# Patient Record
Sex: FEMALE | Race: WHITE | NOT HISPANIC OR LATINO | Employment: OTHER | URBAN - METROPOLITAN AREA
[De-identification: names, ages, dates, MRNs, and addresses within clinical notes are randomized per-mention and may not be internally consistent; named-entity substitution may affect disease eponyms.]

---

## 2023-02-07 PROBLEM — R31.9 HEMATURIA: Status: ACTIVE | Noted: 2023-02-07

## 2023-02-07 PROBLEM — R25.2 LEG CRAMPS: Status: ACTIVE | Noted: 2023-02-07

## 2023-02-07 PROBLEM — M79.606 LEG PAIN, DIFFUSE: Status: ACTIVE | Noted: 2023-02-07

## 2023-02-07 PROBLEM — G57.90 NERVE ENTRAPMENT SYNDROME OF FOOT: Status: ACTIVE | Noted: 2023-02-07

## 2023-02-07 PROBLEM — R91.8 LUNG NODULES: Status: ACTIVE | Noted: 2023-02-07

## 2023-02-07 PROBLEM — D10.1 PAPILLOMA OF TONGUE: Status: ACTIVE | Noted: 2023-02-07

## 2023-02-07 PROBLEM — D72.829 LEUKOCYTOSIS: Status: ACTIVE | Noted: 2023-02-07

## 2023-02-07 PROBLEM — E66.811 OBESITY (BMI 30.0-34.9): Status: ACTIVE | Noted: 2023-02-07

## 2023-02-07 PROBLEM — G56.22 CUBITAL TUNNEL SYNDROME ON LEFT: Status: ACTIVE | Noted: 2023-02-07

## 2023-02-07 PROBLEM — N63.20 LEFT BREAST MASS: Status: ACTIVE | Noted: 2023-02-07

## 2023-02-07 PROBLEM — S39.012A SACROILIAC STRAIN: Status: ACTIVE | Noted: 2023-02-07

## 2023-02-07 PROBLEM — I25.10 CAD (CORONARY ARTERY DISEASE): Status: ACTIVE | Noted: 2023-02-07

## 2023-02-07 PROBLEM — R55 SYNCOPE: Status: ACTIVE | Noted: 2023-02-07

## 2023-02-07 PROBLEM — Z85.3 HISTORY OF BREAST CANCER: Status: ACTIVE | Noted: 2023-02-07

## 2023-02-07 PROBLEM — K21.9 GERD (GASTROESOPHAGEAL REFLUX DISEASE): Status: ACTIVE | Noted: 2023-02-07

## 2023-02-07 PROBLEM — L57.0 ACTINIC KERATOSIS: Status: ACTIVE | Noted: 2023-02-07

## 2023-02-07 PROBLEM — R00.2 PALPITATIONS: Status: ACTIVE | Noted: 2023-02-07

## 2023-02-07 PROBLEM — E04.2 MULTIPLE THYROID NODULES: Status: ACTIVE | Noted: 2023-02-07

## 2023-02-07 PROBLEM — N81.10 FEMALE CYSTOCELE: Status: ACTIVE | Noted: 2023-02-07

## 2023-02-07 PROBLEM — M51.36 DDD (DEGENERATIVE DISC DISEASE), LUMBAR: Status: ACTIVE | Noted: 2023-02-07

## 2023-02-07 PROBLEM — H40.009 BORDERLINE GLAUCOMA: Status: ACTIVE | Noted: 2023-02-07

## 2023-02-07 PROBLEM — R35.1 NOCTURIA: Status: ACTIVE | Noted: 2023-02-07

## 2023-02-07 PROBLEM — R21 RASH: Status: ACTIVE | Noted: 2023-02-07

## 2023-02-07 PROBLEM — R20.0 NUMBNESS OF LEFT HAND: Status: ACTIVE | Noted: 2023-01-25

## 2023-02-07 PROBLEM — M17.9 KNEE OSTEOARTHRITIS: Status: ACTIVE | Noted: 2023-02-07

## 2023-02-07 PROBLEM — N28.89 LEFT KIDNEY MASS: Status: ACTIVE | Noted: 2023-02-07

## 2023-02-07 PROBLEM — M51.369 DDD (DEGENERATIVE DISC DISEASE), LUMBAR: Status: ACTIVE | Noted: 2023-02-07

## 2023-02-07 PROBLEM — I10 BENIGN ESSENTIAL HYPERTENSION: Status: ACTIVE | Noted: 2023-02-07

## 2023-02-07 PROBLEM — L98.9 ARM SKIN LESION, LEFT: Status: ACTIVE | Noted: 2023-02-07

## 2023-02-07 PROBLEM — M89.8X3: Status: ACTIVE | Noted: 2023-02-07

## 2023-02-07 PROBLEM — R33.9 URINARY RETENTION: Status: ACTIVE | Noted: 2023-02-07

## 2023-02-07 PROBLEM — E87.1 HYPONATREMIA: Status: ACTIVE | Noted: 2023-02-07

## 2023-02-07 PROBLEM — E03.9 HYPOTHYROIDISM: Status: ACTIVE | Noted: 2023-02-07

## 2023-02-07 PROBLEM — M54.50 LOW BACK PAIN: Status: ACTIVE | Noted: 2023-02-07

## 2023-02-07 PROBLEM — C50.919 BREAST CANCER (MULTI): Status: ACTIVE | Noted: 2023-02-07

## 2023-02-07 PROBLEM — E78.5 HYPERLIPIDEMIA: Status: ACTIVE | Noted: 2023-02-07

## 2023-02-07 PROBLEM — M54.16 LUMBAR RADICULITIS: Status: ACTIVE | Noted: 2023-02-07

## 2023-02-07 PROBLEM — R92.8 ABNORMAL MAMMOGRAM: Status: ACTIVE | Noted: 2023-02-07

## 2023-02-07 PROBLEM — R30.0 DYSURIA: Status: ACTIVE | Noted: 2023-02-07

## 2023-02-07 PROBLEM — E66.9 OBESITY (BMI 30.0-34.9): Status: ACTIVE | Noted: 2023-02-07

## 2023-02-07 PROBLEM — R01.1 MURMUR: Status: ACTIVE | Noted: 2023-02-07

## 2023-02-07 RX ORDER — LOSARTAN POTASSIUM 50 MG/1
50 TABLET ORAL DAILY
COMMUNITY
Start: 2020-04-05

## 2023-02-07 RX ORDER — METOPROLOL TARTRATE 50 MG/1
25 TABLET ORAL 2 TIMES DAILY
COMMUNITY
Start: 2015-03-10 | End: 2024-05-16 | Stop reason: SDUPTHER

## 2023-02-07 RX ORDER — NAPROXEN SODIUM 220 MG/1
1200 TABLET ORAL DAILY
COMMUNITY
Start: 2019-01-30

## 2023-02-07 RX ORDER — CETIRIZINE HYDROCHLORIDE 10 MG/1
10 TABLET ORAL DAILY
COMMUNITY
Start: 2020-08-06 | End: 2023-05-15 | Stop reason: ALTCHOICE

## 2023-02-07 RX ORDER — GABAPENTIN 300 MG/1
300 CAPSULE ORAL 3 TIMES DAILY
COMMUNITY
Start: 2021-04-13 | End: 2023-04-03 | Stop reason: SDUPTHER

## 2023-02-07 RX ORDER — ASPIRIN 81 MG/1
81 TABLET ORAL
COMMUNITY
Start: 2019-01-30

## 2023-02-07 RX ORDER — NITROFURANTOIN 25; 75 MG/1; MG/1
100 CAPSULE ORAL EVERY 12 HOURS SCHEDULED
COMMUNITY
End: 2023-04-03 | Stop reason: ALTCHOICE

## 2023-02-07 RX ORDER — ROSUVASTATIN CALCIUM 20 MG/1
20 TABLET, COATED ORAL DAILY
COMMUNITY
Start: 2019-06-20

## 2023-02-07 RX ORDER — OMEPRAZOLE 40 MG/1
40 CAPSULE, DELAYED RELEASE ORAL
COMMUNITY
Start: 2021-03-25 | End: 2023-06-26

## 2023-02-07 RX ORDER — NITROGLYCERIN 0.4 MG/1
0.4 TABLET SUBLINGUAL
COMMUNITY
Start: 2019-06-20

## 2023-02-07 RX ORDER — CALCIUM CARBONATE 600 MG
600 TABLET ORAL DAILY
COMMUNITY
Start: 2017-09-14

## 2023-02-07 RX ORDER — AMLODIPINE BESYLATE 10 MG/1
10 TABLET ORAL DAILY
COMMUNITY
Start: 2013-11-06

## 2023-02-07 RX ORDER — ISOSORBIDE MONONITRATE 60 MG/1
60 TABLET, EXTENDED RELEASE ORAL DAILY
COMMUNITY
Start: 2015-12-07

## 2023-02-07 RX ORDER — LEVOTHYROXINE SODIUM 50 UG/1
50 TABLET ORAL DAILY
COMMUNITY
Start: 2015-01-27 | End: 2023-04-20

## 2023-02-07 RX ORDER — MULTIVITAMIN
1 TABLET ORAL DAILY
COMMUNITY

## 2023-02-16 PROBLEM — N39.0 UTI (URINARY TRACT INFECTION): Status: ACTIVE | Noted: 2023-02-16

## 2023-02-16 PROBLEM — M89.319 CLAVICLE ENLARGEMENT: Status: ACTIVE | Noted: 2023-02-16

## 2023-02-16 PROBLEM — R22.2 CHEST MASS: Status: ACTIVE | Noted: 2023-02-16

## 2023-02-16 PROBLEM — J20.9 ACUTE BRONCHITIS: Status: ACTIVE | Noted: 2023-02-16

## 2023-02-16 PROBLEM — R07.9 CHEST PAIN: Status: ACTIVE | Noted: 2023-02-16

## 2023-02-16 PROBLEM — R39.9 UTI SYMPTOMS: Status: ACTIVE | Noted: 2023-02-16

## 2023-02-16 PROBLEM — R07.89 CHEST TIGHTNESS: Status: ACTIVE | Noted: 2023-02-16

## 2023-02-16 PROBLEM — R35.0 FREQUENT URINATION: Status: ACTIVE | Noted: 2023-02-16

## 2023-02-16 PROBLEM — S32.010A COMPRESSION FRACTURE OF L1 LUMBAR VERTEBRA (MULTI): Status: ACTIVE | Noted: 2023-02-16

## 2023-04-03 ENCOUNTER — OFFICE VISIT (OUTPATIENT)
Dept: PRIMARY CARE | Facility: CLINIC | Age: 78
End: 2023-04-03
Payer: MEDICARE

## 2023-04-03 VITALS
BODY MASS INDEX: 32.75 KG/M2 | WEIGHT: 156 LBS | HEIGHT: 58 IN | SYSTOLIC BLOOD PRESSURE: 142 MMHG | TEMPERATURE: 97.7 F | DIASTOLIC BLOOD PRESSURE: 70 MMHG

## 2023-04-03 DIAGNOSIS — R20.2 PARESTHESIA AND PAIN OF BOTH UPPER EXTREMITIES: ICD-10-CM

## 2023-04-03 DIAGNOSIS — S32.010D COMPRESSION FRACTURE OF L1 VERTEBRA WITH ROUTINE HEALING, SUBSEQUENT ENCOUNTER: ICD-10-CM

## 2023-04-03 DIAGNOSIS — Z00.00 ROUTINE GENERAL MEDICAL EXAMINATION AT HEALTH CARE FACILITY: Primary | ICD-10-CM

## 2023-04-03 DIAGNOSIS — I10 BENIGN ESSENTIAL HYPERTENSION: ICD-10-CM

## 2023-04-03 DIAGNOSIS — M79.602 PARESTHESIA AND PAIN OF BOTH UPPER EXTREMITIES: ICD-10-CM

## 2023-04-03 DIAGNOSIS — M79.601 PARESTHESIA AND PAIN OF BOTH UPPER EXTREMITIES: ICD-10-CM

## 2023-04-03 DIAGNOSIS — C50.912 MALIGNANT NEOPLASM OF LEFT FEMALE BREAST, UNSPECIFIED ESTROGEN RECEPTOR STATUS, UNSPECIFIED SITE OF BREAST (MULTI): ICD-10-CM

## 2023-04-03 PROCEDURE — 1160F RVW MEDS BY RX/DR IN RCRD: CPT | Performed by: FAMILY MEDICINE

## 2023-04-03 PROCEDURE — 3078F DIAST BP <80 MM HG: CPT | Performed by: FAMILY MEDICINE

## 2023-04-03 PROCEDURE — 1036F TOBACCO NON-USER: CPT | Performed by: FAMILY MEDICINE

## 2023-04-03 PROCEDURE — 1159F MED LIST DOCD IN RCRD: CPT | Performed by: FAMILY MEDICINE

## 2023-04-03 PROCEDURE — 99397 PER PM REEVAL EST PAT 65+ YR: CPT | Performed by: FAMILY MEDICINE

## 2023-04-03 PROCEDURE — 1170F FXNL STATUS ASSESSED: CPT | Performed by: FAMILY MEDICINE

## 2023-04-03 PROCEDURE — G0439 PPPS, SUBSEQ VISIT: HCPCS | Performed by: FAMILY MEDICINE

## 2023-04-03 PROCEDURE — 3077F SYST BP >= 140 MM HG: CPT | Performed by: FAMILY MEDICINE

## 2023-04-03 RX ORDER — GABAPENTIN 300 MG/1
300 CAPSULE ORAL 3 TIMES DAILY
Qty: 270 CAPSULE | Refills: 1 | Status: SHIPPED | OUTPATIENT
Start: 2023-04-03 | End: 2023-09-15

## 2023-04-03 ASSESSMENT — ACTIVITIES OF DAILY LIVING (ADL)
GROCERY_SHOPPING: INDEPENDENT
TAKING_MEDICATION: INDEPENDENT
BATHING: INDEPENDENT
DRESSING: INDEPENDENT
DOING_HOUSEWORK: INDEPENDENT
MANAGING_FINANCES: INDEPENDENT

## 2023-04-03 ASSESSMENT — ENCOUNTER SYMPTOMS
BACK PAIN: 0
SORE THROAT: 0
HEADACHES: 0
PALPITATIONS: 0
NAUSEA: 0
ROS SKIN COMMENTS: NO MOLES GROWING OR CHANGING.
ARTHRALGIAS: 0
VOMITING: 0
BLOOD IN STOOL: 0
OCCASIONAL FEELINGS OF UNSTEADINESS: 0
FEVER: 0
RHINORRHEA: 0
NUMBNESS: 1
HEMATURIA: 0
SINUS PRESSURE: 0
VOICE CHANGE: 0
CONSTIPATION: 0
WHEEZING: 0
LOSS OF SENSATION IN FEET: 0
ABDOMINAL PAIN: 0
SHORTNESS OF BREATH: 0
DYSPHORIC MOOD: 0
ADENOPATHY: 0
MYALGIAS: 0
COUGH: 0
DEPRESSION: 0
DIARRHEA: 0
WEAKNESS: 0
FATIGUE: 0
FREQUENCY: 0
WOUND: 0

## 2023-04-03 ASSESSMENT — PATIENT HEALTH QUESTIONNAIRE - PHQ9
SUM OF ALL RESPONSES TO PHQ9 QUESTIONS 1 AND 2: 0
1. LITTLE INTEREST OR PLEASURE IN DOING THINGS: NOT AT ALL
2. FEELING DOWN, DEPRESSED OR HOPELESS: NOT AT ALL

## 2023-04-03 NOTE — PATIENT INSTRUCTIONS
It was nice to see you for a general medical exam.  Your exam was normal in general.  You have what looks like carpal tunnel syndrome.  You have tried bracing and occupational therapy.  I recommend that you follow up with Dr Mcnally.  I recommend weight loss.  Try to avoid sugars and starches in the diet.  Base your diet on fresh fruits and vegetables.  Continue to exercise regularly.  Return in six months for a recheck and sooner if you are worse.

## 2023-04-03 NOTE — PROGRESS NOTES
"Subjective   Reason for Visit: Ana Vega is an 77 y.o. female here for a Medicare Wellness visit.     Past Medical, Surgical, and Family History reviewed and updated in chart.    Reviewed all medications by prescribing practitioner or clinical pharmacist (such as prescriptions, OTCs, herbal therapies and supplements) and documented in the medical record.  Does not have advanced directives.  Recommend get them and bring in a copy.    Mammogram 2/2023. Has a hx of right breast cancer.    Has a hx of L1 fx.  From syncopde d/t chemo.    Colonoscopy 2019.  Found two polyps. Told to repeat in five years.    Had a bone density scan in November.    Quit smoking 1976.  HPI    Patient Care Team:  Jose Corea DO as PCP - General  Jose Corea DO as PCP - [employer] PCP     Review of Systems   Constitutional:  Negative for fatigue and fever.   HENT:  Negative for rhinorrhea, sinus pressure, sore throat and voice change.    Respiratory:  Negative for cough, shortness of breath and wheezing.    Cardiovascular:  Negative for chest pain, palpitations and leg swelling.   Gastrointestinal:  Negative for abdominal pain, blood in stool, constipation, diarrhea, nausea and vomiting.   Genitourinary:  Negative for frequency, hematuria and vaginal bleeding.   Musculoskeletal:  Negative for arthralgias, back pain and myalgias.   Skin:  Negative for rash and wound.        No moles growing or changing.   Neurological:  Positive for numbness. Negative for syncope, weakness and headaches.        Numbness and tingling in the hands over the last four or five months.     Hematological:  Negative for adenopathy.   Psychiatric/Behavioral:  Negative for dysphoric mood.        Objective   Vitals:  /70 (BP Location: Right arm, Patient Position: Sitting)   Temp 36.5 °C (97.7 °F)   Ht 1.461 m (4' 9.5\")   Wt 70.8 kg (156 lb)   BMI 33.17 kg/m²       Physical Exam  Vitals reviewed.   Constitutional:       " General: She is not in acute distress.     Appearance: Normal appearance. She is not ill-appearing or toxic-appearing.   HENT:      Head: Normocephalic and atraumatic.      Right Ear: Tympanic membrane, ear canal and external ear normal.      Left Ear: Tympanic membrane, ear canal and external ear normal.      Nose: Nose normal.      Mouth/Throat:      Mouth: Mucous membranes are moist.   Eyes:      Extraocular Movements: Extraocular movements intact.      Conjunctiva/sclera: Conjunctivae normal.      Pupils: Pupils are equal, round, and reactive to light.   Cardiovascular:      Rate and Rhythm: Normal rate and regular rhythm.      Heart sounds: No murmur heard.  Pulmonary:      Effort: Pulmonary effort is normal.      Breath sounds: Normal breath sounds.   Abdominal:      General: Bowel sounds are normal. There is no distension.      Palpations: Abdomen is soft. There is no mass.      Tenderness: There is no abdominal tenderness. There is no guarding or rebound.   Musculoskeletal:         General: No tenderness.      Cervical back: Neck supple.      Right lower leg: No edema.      Left lower leg: No edema.   Skin:     Coloration: Skin is not jaundiced or pale.      Findings: No rash.   Neurological:      General: No focal deficit present.      Mental Status: She is alert and oriented to person, place, and time. Mental status is at baseline.      Sensory: Sensory deficit (tingling in the hands.  Tinels pos median n.  has tried bracing. not helping.) present.   Psychiatric:         Mood and Affect: Mood normal.         Behavior: Behavior normal.         Thought Content: Thought content normal.         Judgment: Judgment normal.         Assessment/Plan   Problem List Items Addressed This Visit          Circulatory    Benign essential hypertension       Musculoskeletal    Compression fracture of L1 lumbar vertebra (CMS/HCC)       Other    Breast cancer (CMS/HCC)     Other Visit Diagnoses       Routine general medical  examination at health care facility    -  Primary    Relevant Orders    1 Year Follow Up In Primary Care - Wellness Exam    Paresthesia and pain of both upper extremities        Relevant Medications    gabapentin (Neurontin) 300 mg capsule        It was nice to see you for a general medical exam.  Your exam was normal in general.  You have what looks like carpal tunnel syndrome.  You have tried bracing and occupational therapy.  I recommend that you follow up with Dr Mcnally.  I recommend weight loss.  Try to avoid sugars and starches in the diet.  Base your diet on fresh fruits and vegetables.  Continue to exercise regularly.  Return in six months for a recheck and sooner if you are worse.

## 2023-04-19 DIAGNOSIS — E03.9 ACQUIRED HYPOTHYROIDISM: Primary | ICD-10-CM

## 2023-04-20 RX ORDER — LEVOTHYROXINE SODIUM 50 UG/1
TABLET ORAL
Qty: 90 TABLET | Refills: 1 | Status: SHIPPED | OUTPATIENT
Start: 2023-04-20 | End: 2023-11-01

## 2023-05-15 ENCOUNTER — OFFICE VISIT (OUTPATIENT)
Dept: PRIMARY CARE | Facility: CLINIC | Age: 78
End: 2023-05-15
Payer: MEDICARE

## 2023-05-15 VITALS
WEIGHT: 155 LBS | DIASTOLIC BLOOD PRESSURE: 62 MMHG | TEMPERATURE: 97.1 F | BODY MASS INDEX: 32.96 KG/M2 | SYSTOLIC BLOOD PRESSURE: 160 MMHG

## 2023-05-15 DIAGNOSIS — R30.0 DYSURIA: ICD-10-CM

## 2023-05-15 DIAGNOSIS — L30.9 DERMATITIS OF EXTERNAL EAR: Primary | ICD-10-CM

## 2023-05-15 LAB
POC APPEARANCE, URINE: CLEAR
POC BILIRUBIN, URINE: NEGATIVE
POC BLOOD, URINE: NEGATIVE
POC COLOR, URINE: ABNORMAL
POC GLUCOSE, URINE: NEGATIVE MG/DL
POC KETONES, URINE: NEGATIVE MG/DL
POC LEUKOCYTES, URINE: NEGATIVE
POC NITRITE,URINE: NEGATIVE
POC PH, URINE: 5.5 PH
POC PROTEIN, URINE: NEGATIVE MG/DL
POC SPECIFIC GRAVITY, URINE: 1.01
POC UROBILINOGEN, URINE: 0.2 EU/DL

## 2023-05-15 PROCEDURE — 1159F MED LIST DOCD IN RCRD: CPT | Performed by: FAMILY MEDICINE

## 2023-05-15 PROCEDURE — 99213 OFFICE O/P EST LOW 20 MIN: CPT | Performed by: FAMILY MEDICINE

## 2023-05-15 PROCEDURE — 1036F TOBACCO NON-USER: CPT | Performed by: FAMILY MEDICINE

## 2023-05-15 PROCEDURE — 87086 URINE CULTURE/COLONY COUNT: CPT

## 2023-05-15 PROCEDURE — 3077F SYST BP >= 140 MM HG: CPT | Performed by: FAMILY MEDICINE

## 2023-05-15 PROCEDURE — 1160F RVW MEDS BY RX/DR IN RCRD: CPT | Performed by: FAMILY MEDICINE

## 2023-05-15 PROCEDURE — 81003 URINALYSIS AUTO W/O SCOPE: CPT | Performed by: FAMILY MEDICINE

## 2023-05-15 PROCEDURE — 3078F DIAST BP <80 MM HG: CPT | Performed by: FAMILY MEDICINE

## 2023-05-15 RX ORDER — AMOXICILLIN AND CLAVULANATE POTASSIUM 875; 125 MG/1; MG/1
875 TABLET, FILM COATED ORAL 2 TIMES DAILY
Qty: 20 TABLET | Refills: 0 | Status: SHIPPED | OUTPATIENT
Start: 2023-05-15 | End: 2023-05-25

## 2023-05-15 RX ORDER — TRIAMCINOLONE ACETONIDE 1 MG/G
CREAM TOPICAL 2 TIMES DAILY
Qty: 30 G | Refills: 0 | Status: SHIPPED | OUTPATIENT
Start: 2023-05-15 | End: 2024-01-19 | Stop reason: ALTCHOICE

## 2023-05-15 NOTE — PROGRESS NOTES
Subjective   Patient ID: [id number]     Ana Vega is a 77 y.o. female who presents for lump on right ear (Also frequent urination ).  HPI  She complains of a lump on her right ear that is painful.  It has been there for about a month.  It has been about the same.  Maybe a little bit better over the month.  No drainage.  No itching but it hurts.      She also complains of frequent urination.  That has developed over the weeks.  UA was neg today.  Mild dysuria.  No hematuria.    Objective     /62 (BP Location: Right arm, Patient Position: Sitting)   Temp 36.2 °C (97.1 °F)   Wt 70.3 kg (155 lb)   BMI 32.96 kg/m²      Physical Exam  Constitutional:       Appearance: Normal appearance.   HENT:      Ears:      Comments: Helix of right ear.  Small abrasion with surrounding rash.  Mildly tender.  Dry.  No drainage.      Cardiovascular:      Rate and Rhythm: Normal rate and regular rhythm.   Pulmonary:      Effort: Pulmonary effort is normal. No respiratory distress.      Breath sounds: Normal breath sounds.   Neurological:      Mental Status: She is alert.         Assessment/Plan   Problem List Items Addressed This Visit          Nervous    Dysuria    Relevant Medications    amoxicillin-pot clavulanate (Augmentin) 875-125 mg tablet    Other Relevant Orders    Urine Culture     Other Visit Diagnoses       Dermatitis of external ear    -  Primary    Relevant Medications    triamcinolone (Kenalog) 0.1 % cream    amoxicillin-pot clavulanate (Augmentin) 875-125 mg tablet        I will prescribe antibiotics and a steroid cream.  Regarding your urinary complaints, I will order a urine culture.  Your urinalysis here today was normal. Return in one month if the ear problem is still there.  A dermatology referral may be needed if this does not go away with the above treatment.    Jose Corea,

## 2023-05-15 NOTE — PATIENT INSTRUCTIONS
I will prescribe antibiotics and a steroid cream.  Regarding your urinary complaints, I will order a urine culture.  Your urinalysis here today was normal. Return in one month if the ear problem is still there.  A dermatology referral may be needed if this does not go away with the above treatment.

## 2023-05-17 LAB — URINE CULTURE: NORMAL

## 2023-05-18 ENCOUNTER — TELEPHONE (OUTPATIENT)
Dept: PRIMARY CARE | Facility: CLINIC | Age: 78
End: 2023-05-18
Payer: MEDICARE

## 2023-05-18 NOTE — TELEPHONE ENCOUNTER
----- Message from Jose Corea, DO sent at 5/17/2023  5:15 PM EDT -----  Please let her know her urine culture did not show any infection.

## 2023-06-12 ENCOUNTER — TELEPHONE (OUTPATIENT)
Dept: PRIMARY CARE | Facility: CLINIC | Age: 78
End: 2023-06-12
Payer: MEDICARE

## 2023-06-12 DIAGNOSIS — M79.676 PAIN OF TOE, UNSPECIFIED LATERALITY: Primary | ICD-10-CM

## 2023-06-26 DIAGNOSIS — K21.9 GASTROESOPHAGEAL REFLUX DISEASE, UNSPECIFIED WHETHER ESOPHAGITIS PRESENT: Primary | ICD-10-CM

## 2023-06-26 RX ORDER — OMEPRAZOLE 40 MG/1
CAPSULE, DELAYED RELEASE ORAL
Qty: 90 CAPSULE | Refills: 1 | Status: SHIPPED | OUTPATIENT
Start: 2023-06-26 | End: 2023-12-22

## 2023-09-15 DIAGNOSIS — M79.601 PARESTHESIA AND PAIN OF BOTH UPPER EXTREMITIES: ICD-10-CM

## 2023-09-15 DIAGNOSIS — R20.2 PARESTHESIA AND PAIN OF BOTH UPPER EXTREMITIES: ICD-10-CM

## 2023-09-15 DIAGNOSIS — M79.602 PARESTHESIA AND PAIN OF BOTH UPPER EXTREMITIES: ICD-10-CM

## 2023-09-15 RX ORDER — GABAPENTIN 300 MG/1
300 CAPSULE ORAL 3 TIMES DAILY
Qty: 270 CAPSULE | Refills: 1 | Status: SHIPPED | OUTPATIENT
Start: 2023-09-15 | End: 2024-03-18 | Stop reason: SDUPTHER

## 2023-09-26 ASSESSMENT — ENCOUNTER SYMPTOMS
HYPERTENSION: 1
SHORTNESS OF BREATH: 0
NECK PAIN: 0
ORTHOPNEA: 0
PALPITATIONS: 0
BLURRED VISION: 0
PND: 0
SWEATS: 0
HEADACHES: 0

## 2023-10-02 ENCOUNTER — OFFICE VISIT (OUTPATIENT)
Dept: PRIMARY CARE | Facility: CLINIC | Age: 78
End: 2023-10-02
Payer: MEDICARE

## 2023-10-02 VITALS
DIASTOLIC BLOOD PRESSURE: 72 MMHG | SYSTOLIC BLOOD PRESSURE: 120 MMHG | BODY MASS INDEX: 32.75 KG/M2 | WEIGHT: 154 LBS | TEMPERATURE: 97.6 F

## 2023-10-02 DIAGNOSIS — Z86.39 H/O HYPERGLYCEMIA: ICD-10-CM

## 2023-10-02 DIAGNOSIS — I25.10 CORONARY ARTERY DISEASE INVOLVING NATIVE CORONARY ARTERY OF NATIVE HEART WITHOUT ANGINA PECTORIS: ICD-10-CM

## 2023-10-02 DIAGNOSIS — E03.9 ACQUIRED HYPOTHYROIDISM: ICD-10-CM

## 2023-10-02 DIAGNOSIS — G62.9 PERIPHERAL POLYNEUROPATHY: ICD-10-CM

## 2023-10-02 DIAGNOSIS — I70.223 ATHEROSCLEROSIS OF NATIVE ARTERIES OF EXTREMITIES WITH REST PAIN, BILATERAL LEGS (MULTI): Primary | ICD-10-CM

## 2023-10-02 DIAGNOSIS — I10 BENIGN ESSENTIAL HYPERTENSION: ICD-10-CM

## 2023-10-02 DIAGNOSIS — M17.0 PRIMARY OSTEOARTHRITIS OF BOTH KNEES: ICD-10-CM

## 2023-10-02 DIAGNOSIS — E78.5 HYPERLIPIDEMIA, UNSPECIFIED HYPERLIPIDEMIA TYPE: ICD-10-CM

## 2023-10-02 PROCEDURE — 3074F SYST BP LT 130 MM HG: CPT | Performed by: FAMILY MEDICINE

## 2023-10-02 PROCEDURE — 1160F RVW MEDS BY RX/DR IN RCRD: CPT | Performed by: FAMILY MEDICINE

## 2023-10-02 PROCEDURE — 99214 OFFICE O/P EST MOD 30 MIN: CPT | Performed by: FAMILY MEDICINE

## 2023-10-02 PROCEDURE — 1159F MED LIST DOCD IN RCRD: CPT | Performed by: FAMILY MEDICINE

## 2023-10-02 PROCEDURE — 3078F DIAST BP <80 MM HG: CPT | Performed by: FAMILY MEDICINE

## 2023-10-02 PROCEDURE — 1036F TOBACCO NON-USER: CPT | Performed by: FAMILY MEDICINE

## 2023-10-02 ASSESSMENT — ENCOUNTER SYMPTOMS
SHORTNESS OF BREATH: 0
HYPERTENSION: 1
PND: 0
ORTHOPNEA: 0
HEADACHES: 0
NECK PAIN: 0
SWEATS: 0
BLURRED VISION: 0
PALPITATIONS: 0

## 2023-10-02 NOTE — PATIENT INSTRUCTIONS
Continue the same medications.  I will order labs to be done fasting.  You can get these done at the  building at [address] [facility] in [place].  I will order xrays of the knees to evaluate your arthritis.

## 2023-10-02 NOTE — PROGRESS NOTES
Subjective   Patient ID: [id number]     Ana Vega is a 77 y.o. female who presents for Follow-up.  Hypertension  This is a recurrent problem. The current episode started more than 1 year ago. The problem has been gradually improving since onset. The problem is controlled. Pertinent negatives include no anxiety, blurred vision, chest pain, headaches, malaise/fatigue, neck pain, orthopnea, palpitations, peripheral edema, PND, shortness of breath or sweats.     She is here for a recheck and refills on medication.  She is on aspirin, amlodipine, calcium, gabapentin, Imdur, losartan, metoprolol, Omega 3 FA, rosuvastatin, Synthroid and Kenalog cream.    BP well controlled today at 120/72.  She checks her BP outside the office with similar results.  SBP does go up to 130.      She denies any chest pain or SOB.      She complains of right knee pain for months.  Lateral aspect of the knee joint.  She is requesting some xrays.  She has bilateral knee pain but iot is worse on the right.    She has tingling in the hands, feet and legs.  That has been going on for a couple of months.          Objective     /72 (BP Location: Right arm, Patient Position: Sitting)   Temp 36.4 °C (97.6 °F)   Wt 69.9 kg (154 lb)   BMI 32.75 kg/m²      Physical Exam  Cardiovascular:      Rate and Rhythm: Normal rate and regular rhythm.      Pulses: Normal pulses.      Heart sounds: Normal heart sounds.   Pulmonary:      Effort: Pulmonary effort is normal.      Breath sounds: Normal breath sounds. No wheezing.   Abdominal:      General: Abdomen is flat.      Palpations: Abdomen is soft.      Tenderness: There is no abdominal tenderness.   Musculoskeletal:      Comments: Tender at both knees.  Especially the right lateral compartment with bony hypertrophy.   Skin:     General: Skin is warm.      Comments: No foot ulcer. Pedal pulses normal.   Neurological:      General: No focal deficit present.      Mental Status: She is alert.       Sensory: Sensory deficit (stocking glove numbness tingling.) present.         Assessment/Plan   Problem List Items Addressed This Visit       Benign essential hypertension    CAD (coronary artery disease)    Hyperlipidemia    Relevant Orders    Lipid Panel    Hypothyroidism    Relevant Orders    Thyroid Stimulating Hormone    Knee osteoarthritis    Relevant Orders    XR knee 1-2 views bilateral    Atherosclerosis of native arteries of extremities with rest pain, bilateral legs (CMS/HCC) - Primary     Other Visit Diagnoses       Peripheral polyneuropathy        Relevant Orders    Urinalysis with Reflex Microscopic    Thyroid Stimulating Hormone    Hemoglobin A1C    Lipid Panel    Comprehensive Metabolic Panel    CBC and Auto Differential    Vitamin B12    H/O hyperglycemia        Relevant Orders    Hemoglobin A1C        Continue the same medications.  I will order labs to be done fasting.  You can get these done at the  building at [address] in [place].  I will order xrays of the knees to evaluate your arthritis.      She states she will get her flu shot this fall at [facility].    Jose Corea, DO

## 2023-10-04 ENCOUNTER — LAB (OUTPATIENT)
Dept: LAB | Facility: LAB | Age: 78
End: 2023-10-04
Payer: MEDICARE

## 2023-10-04 ENCOUNTER — ANCILLARY PROCEDURE (OUTPATIENT)
Dept: RADIOLOGY | Facility: CLINIC | Age: 78
End: 2023-10-04
Payer: MEDICARE

## 2023-10-04 DIAGNOSIS — Z86.39 H/O HYPERGLYCEMIA: ICD-10-CM

## 2023-10-04 DIAGNOSIS — G62.9 PERIPHERAL POLYNEUROPATHY: ICD-10-CM

## 2023-10-04 DIAGNOSIS — E78.5 HYPERLIPIDEMIA, UNSPECIFIED HYPERLIPIDEMIA TYPE: ICD-10-CM

## 2023-10-04 DIAGNOSIS — M17.0 PRIMARY OSTEOARTHRITIS OF BOTH KNEES: ICD-10-CM

## 2023-10-04 DIAGNOSIS — E03.9 ACQUIRED HYPOTHYROIDISM: ICD-10-CM

## 2023-10-04 LAB
ALBUMIN SERPL BCP-MCNC: 4.4 G/DL (ref 3.4–5)
ALP SERPL-CCNC: 76 U/L (ref 33–136)
ALT SERPL W P-5'-P-CCNC: 20 U/L (ref 7–45)
ANION GAP SERPL CALC-SCNC: 13 MMOL/L (ref 10–20)
AST SERPL W P-5'-P-CCNC: 26 U/L (ref 9–39)
BASOPHILS # BLD AUTO: 0.04 X10*3/UL (ref 0–0.1)
BASOPHILS NFR BLD AUTO: 0.8 %
BILIRUB SERPL-MCNC: 0.6 MG/DL (ref 0–1.2)
BUN SERPL-MCNC: 16 MG/DL (ref 6–23)
CALCIUM SERPL-MCNC: 9.8 MG/DL (ref 8.6–10.3)
CHLORIDE SERPL-SCNC: 100 MMOL/L (ref 98–107)
CHOLEST SERPL-MCNC: 132 MG/DL (ref 0–199)
CHOLESTEROL/HDL RATIO: 2.5
CO2 SERPL-SCNC: 28 MMOL/L (ref 21–32)
CREAT SERPL-MCNC: 0.93 MG/DL (ref 0.5–1.05)
EOSINOPHIL # BLD AUTO: 0.16 X10*3/UL (ref 0–0.4)
EOSINOPHIL NFR BLD AUTO: 3.4 %
ERYTHROCYTE [DISTWIDTH] IN BLOOD BY AUTOMATED COUNT: 13.7 % (ref 11.5–14.5)
EST. AVERAGE GLUCOSE BLD GHB EST-MCNC: 111 MG/DL
GFR SERPL CREATININE-BSD FRML MDRD: 63 ML/MIN/1.73M*2
GLUCOSE SERPL-MCNC: 88 MG/DL (ref 74–99)
HBA1C MFR BLD: 5.5 %
HCT VFR BLD AUTO: 43 % (ref 36–46)
HDLC SERPL-MCNC: 52.2 MG/DL
HGB BLD-MCNC: 14.3 G/DL (ref 12–16)
IMM GRANULOCYTES # BLD AUTO: 0.01 X10*3/UL (ref 0–0.5)
IMM GRANULOCYTES NFR BLD AUTO: 0.2 % (ref 0–0.9)
LDLC SERPL CALC-MCNC: 60 MG/DL (ref 140–190)
LYMPHOCYTES # BLD AUTO: 1.19 X10*3/UL (ref 0.8–3)
LYMPHOCYTES NFR BLD AUTO: 25.1 %
MCH RBC QN AUTO: 30.6 PG (ref 26–34)
MCHC RBC AUTO-ENTMCNC: 33.3 G/DL (ref 32–36)
MCV RBC AUTO: 92 FL (ref 80–100)
MONOCYTES # BLD AUTO: 0.58 X10*3/UL (ref 0.05–0.8)
MONOCYTES NFR BLD AUTO: 12.2 %
NEUTROPHILS # BLD AUTO: 2.77 X10*3/UL (ref 1.6–5.5)
NEUTROPHILS NFR BLD AUTO: 58.3 %
NON HDL CHOLESTEROL: 80 MG/DL (ref 0–149)
NRBC BLD-RTO: 0 /100 WBCS (ref 0–0)
PLATELET # BLD AUTO: 244 X10*3/UL (ref 150–450)
PMV BLD AUTO: 9.7 FL (ref 7.5–11.5)
POTASSIUM SERPL-SCNC: 4.5 MMOL/L (ref 3.5–5.3)
PROT SERPL-MCNC: 7.5 G/DL (ref 6.4–8.2)
RBC # BLD AUTO: 4.67 X10*6/UL (ref 4–5.2)
SODIUM SERPL-SCNC: 136 MMOL/L (ref 136–145)
TRIGL SERPL-MCNC: 101 MG/DL (ref 0–149)
TSH SERPL-ACNC: 2.98 MIU/L (ref 0.44–3.98)
VIT B12 SERPL-MCNC: 791 PG/ML (ref 211–911)
VLDL: 20 MG/DL (ref 0–40)
WBC # BLD AUTO: 4.8 X10*3/UL (ref 4.4–11.3)

## 2023-10-04 PROCEDURE — 73562 X-RAY EXAM OF KNEE 3: CPT | Mod: BILATERAL PROCEDURE | Performed by: RADIOLOGY

## 2023-10-04 PROCEDURE — 36415 COLL VENOUS BLD VENIPUNCTURE: CPT

## 2023-10-04 PROCEDURE — 80050 GENERAL HEALTH PANEL: CPT

## 2023-10-04 PROCEDURE — 83036 HEMOGLOBIN GLYCOSYLATED A1C: CPT

## 2023-10-04 PROCEDURE — 73562 X-RAY EXAM OF KNEE 3: CPT | Mod: 50,FY

## 2023-10-04 PROCEDURE — 80061 LIPID PANEL: CPT

## 2023-10-04 PROCEDURE — 82607 VITAMIN B-12: CPT

## 2023-10-05 ENCOUNTER — TELEPHONE (OUTPATIENT)
Dept: PRIMARY CARE | Facility: CLINIC | Age: 78
End: 2023-10-05
Payer: MEDICARE

## 2023-10-05 DIAGNOSIS — N39.0 URINARY TRACT INFECTION WITHOUT HEMATURIA, SITE UNSPECIFIED: Primary | ICD-10-CM

## 2023-10-05 DIAGNOSIS — Z00.00 GENERAL MEDICAL EXAM: Primary | ICD-10-CM

## 2023-10-05 LAB
APPEARANCE UR: CLEAR
BACTERIA #/AREA URNS AUTO: ABNORMAL /HPF
BILIRUB UR STRIP.AUTO-MCNC: NEGATIVE MG/DL
COLOR UR: YELLOW
GLUCOSE UR STRIP.AUTO-MCNC: NEGATIVE MG/DL
KETONES UR STRIP.AUTO-MCNC: NEGATIVE MG/DL
LEUKOCYTE ESTERASE UR QL STRIP.AUTO: ABNORMAL
MUCOUS THREADS #/AREA URNS AUTO: ABNORMAL /LPF
NITRITE UR QL STRIP.AUTO: NEGATIVE
PH UR STRIP.AUTO: 7 [PH]
PROT UR STRIP.AUTO-MCNC: NEGATIVE MG/DL
RBC # UR STRIP.AUTO: NEGATIVE /UL
RBC #/AREA URNS AUTO: ABNORMAL /HPF
SP GR UR STRIP.AUTO: 1.01
UROBILINOGEN UR STRIP.AUTO-MCNC: <2 MG/DL
WBC #/AREA URNS AUTO: ABNORMAL /HPF

## 2023-10-05 PROCEDURE — 81001 URINALYSIS AUTO W/SCOPE: CPT

## 2023-10-05 NOTE — TELEPHONE ENCOUNTER
Jose Corea, DO to [facility] [place] [id number] Clinical Support Staff    Please let her know her labs showed no significant abnormalities.

## 2023-10-06 ENCOUNTER — TELEPHONE (OUTPATIENT)
Dept: PRIMARY CARE | Facility: CLINIC | Age: 78
End: 2023-10-06
Payer: MEDICARE

## 2023-10-06 DIAGNOSIS — M54.16 LUMBAR RADICULITIS: Primary | ICD-10-CM

## 2023-10-06 RX ORDER — DULOXETIN HYDROCHLORIDE 30 MG/1
30 CAPSULE, DELAYED RELEASE ORAL DAILY
Qty: 30 CAPSULE | Refills: 1 | Status: SHIPPED | OUTPATIENT
Start: 2023-10-06 | End: 2023-10-31

## 2023-10-06 NOTE — TELEPHONE ENCOUNTER
Jose Corea, DO to You    I ordered an EMG and nerve conduction velocity test to evaluate her leg pain.  I called in duloxetine to try to help this as well.

## 2023-10-06 NOTE — TELEPHONE ENCOUNTER
Jose Corea, DO to [facility] [place] [id number] Clinical Support Staff    Please let her know her urinalysis showed no significant problems.

## 2023-10-06 NOTE — TELEPHONE ENCOUNTER
Patient states that she is still having numbness/tingling in legs and hands and pain in back of legs.  She is asking what the next step to diagnosing/treating issue is since labs came back normal?  Patient may be reached at [phone]

## 2023-10-31 DIAGNOSIS — M54.16 LUMBAR RADICULITIS: ICD-10-CM

## 2023-10-31 RX ORDER — DULOXETIN HYDROCHLORIDE 30 MG/1
30 CAPSULE, DELAYED RELEASE ORAL DAILY
Qty: 90 CAPSULE | Refills: 1 | Status: SHIPPED | OUTPATIENT
Start: 2023-10-31 | End: 2024-01-19 | Stop reason: SINTOL

## 2023-11-01 DIAGNOSIS — E03.9 ACQUIRED HYPOTHYROIDISM: ICD-10-CM

## 2023-11-01 RX ORDER — LEVOTHYROXINE SODIUM 50 UG/1
TABLET ORAL
Qty: 90 TABLET | Refills: 1 | Status: SHIPPED | OUTPATIENT
Start: 2023-11-01 | End: 2024-05-07 | Stop reason: SDUPTHER

## 2023-11-03 ENCOUNTER — TELEPHONE (OUTPATIENT)
Dept: PRIMARY CARE | Facility: CLINIC | Age: 78
End: 2023-11-03
Payer: MEDICARE

## 2023-11-24 ENCOUNTER — APPOINTMENT (OUTPATIENT)
Dept: NEUROLOGY | Facility: CLINIC | Age: 78
End: 2023-11-24
Payer: MEDICARE

## 2023-11-30 ENCOUNTER — APPOINTMENT (OUTPATIENT)
Dept: NEUROLOGY | Facility: CLINIC | Age: 78
End: 2023-11-30
Payer: MEDICARE

## 2023-12-08 ENCOUNTER — HOSPITAL ENCOUNTER (OUTPATIENT)
Dept: NEUROLOGY | Facility: CLINIC | Age: 78
Discharge: HOME | End: 2023-12-08
Payer: MEDICARE

## 2023-12-08 DIAGNOSIS — M54.16 LUMBAR RADICULITIS: ICD-10-CM

## 2023-12-08 PROCEDURE — 95908 NRV CNDJ TST 3-4 STUDIES: CPT | Performed by: SPECIALIST

## 2023-12-08 PROCEDURE — 95886 MUSC TEST DONE W/N TEST COMP: CPT | Performed by: SPECIALIST

## 2023-12-21 DIAGNOSIS — K21.9 GASTROESOPHAGEAL REFLUX DISEASE, UNSPECIFIED WHETHER ESOPHAGITIS PRESENT: ICD-10-CM

## 2023-12-22 RX ORDER — OMEPRAZOLE 40 MG/1
CAPSULE, DELAYED RELEASE ORAL
Qty: 90 CAPSULE | Refills: 1 | Status: SHIPPED | OUTPATIENT
Start: 2023-12-22 | End: 2024-05-28

## 2024-01-05 ENCOUNTER — APPOINTMENT (OUTPATIENT)
Dept: NEUROLOGY | Facility: CLINIC | Age: 79
End: 2024-01-05
Payer: MEDICARE

## 2024-01-19 ENCOUNTER — OFFICE VISIT (OUTPATIENT)
Dept: PRIMARY CARE | Facility: CLINIC | Age: 79
End: 2024-01-19
Payer: MEDICARE

## 2024-01-19 VITALS
TEMPERATURE: 97.7 F | WEIGHT: 145 LBS | BODY MASS INDEX: 30.83 KG/M2 | SYSTOLIC BLOOD PRESSURE: 136 MMHG | DIASTOLIC BLOOD PRESSURE: 80 MMHG

## 2024-01-19 DIAGNOSIS — R39.9 UTI SYMPTOMS: Primary | ICD-10-CM

## 2024-01-19 LAB
POC BILIRUBIN, URINE: NEGATIVE
POC BLOOD, URINE: ABNORMAL
POC GLUCOSE, URINE: NEGATIVE MG/DL
POC KETONES, URINE: NEGATIVE MG/DL
POC LEUKOCYTES, URINE: ABNORMAL
POC NITRITE,URINE: NEGATIVE
POC PH, URINE: 7 PH
POC PROTEIN, URINE: ABNORMAL MG/DL
POC SPECIFIC GRAVITY, URINE: 1.02
POC UROBILINOGEN, URINE: 0.2 EU/DL

## 2024-01-19 PROCEDURE — 1159F MED LIST DOCD IN RCRD: CPT | Performed by: FAMILY MEDICINE

## 2024-01-19 PROCEDURE — 99213 OFFICE O/P EST LOW 20 MIN: CPT | Performed by: FAMILY MEDICINE

## 2024-01-19 PROCEDURE — 1160F RVW MEDS BY RX/DR IN RCRD: CPT | Performed by: FAMILY MEDICINE

## 2024-01-19 PROCEDURE — 3079F DIAST BP 80-89 MM HG: CPT | Performed by: FAMILY MEDICINE

## 2024-01-19 PROCEDURE — 81003 URINALYSIS AUTO W/O SCOPE: CPT | Performed by: FAMILY MEDICINE

## 2024-01-19 PROCEDURE — 3075F SYST BP GE 130 - 139MM HG: CPT | Performed by: FAMILY MEDICINE

## 2024-01-19 PROCEDURE — 1036F TOBACCO NON-USER: CPT | Performed by: FAMILY MEDICINE

## 2024-01-19 RX ORDER — NITROFURANTOIN 25; 75 MG/1; MG/1
100 CAPSULE ORAL 2 TIMES DAILY
Qty: 14 CAPSULE | Refills: 0 | Status: SHIPPED | OUTPATIENT
Start: 2024-01-19 | End: 2024-01-26

## 2024-01-19 ASSESSMENT — PATIENT HEALTH QUESTIONNAIRE - PHQ9
1. LITTLE INTEREST OR PLEASURE IN DOING THINGS: NOT AT ALL
2. FEELING DOWN, DEPRESSED OR HOPELESS: NOT AT ALL
SUM OF ALL RESPONSES TO PHQ9 QUESTIONS 1 AND 2: 0

## 2024-01-19 ASSESSMENT — ENCOUNTER SYMPTOMS: DEPRESSION: 0

## 2024-01-19 NOTE — PATIENT INSTRUCTIONS
I prescribed antibiotics for a UTI.  Drink plenty of water.  Return in one or two weeks if this persists.  Return right away if this worsens.

## 2024-01-19 NOTE — PROGRESS NOTES
Subjective   Patient ID: [id number]     Ana Vega is a 78 y.o. female who presents for UTI.  HPI  She complains of a UTI.  She complains of suprapubic pressure.  Has frequency and urgency.  Some burning. These symptoms started yesterday.  No abdominal pain.  No increased back pain.  No fever.  No hematuria.     She states it has been a year since her last UTI.  She states she had four UTIs in the previous year.      She is eating and drinking ok.  No nausea or diarrhea.  Objective     /80 (BP Location: Right arm, Patient Position: Sitting)   Temp 36.5 °C (97.7 °F) (Skin)   Wt 65.8 kg (145 lb)   BMI 30.83 kg/m²      Physical Exam  Constitutional:       General: She is not in acute distress.     Appearance: She is not ill-appearing or toxic-appearing.   Cardiovascular:      Rate and Rhythm: Normal rate and regular rhythm.      Heart sounds: Normal heart sounds.   Pulmonary:      Effort: Pulmonary effort is normal.      Breath sounds: Normal breath sounds.   Abdominal:      General: Abdomen is flat.      Palpations: Abdomen is soft.      Tenderness: There is no abdominal tenderness. There is no guarding or rebound.      Comments: Candido neg.   Neurological:      General: No focal deficit present.      Mental Status: She is alert.         Assessment/Plan   Problem List Items Addressed This Visit       UTI symptoms - Primary    Relevant Orders    POCT UA Automated manually resulted     I prescribed antibiotics for a UTI.  Drink plenty of water.  Return in one or two weeks if this persists.  Return right away if this worsens.    Jose Corea, DO

## 2024-02-05 ENCOUNTER — APPOINTMENT (OUTPATIENT)
Dept: RADIOLOGY | Facility: HOSPITAL | Age: 79
End: 2024-02-05
Payer: MEDICARE

## 2024-02-09 ENCOUNTER — LAB (OUTPATIENT)
Dept: LAB | Facility: CLINIC | Age: 79
End: 2024-02-09
Payer: MEDICARE

## 2024-02-09 DIAGNOSIS — C50.912 MALIGNANT NEOPLASM OF LEFT FEMALE BREAST, UNSPECIFIED ESTROGEN RECEPTOR STATUS, UNSPECIFIED SITE OF BREAST (MULTI): ICD-10-CM

## 2024-02-09 LAB
ALBUMIN SERPL BCP-MCNC: 4 G/DL (ref 3.4–5)
ALP SERPL-CCNC: 66 U/L (ref 33–136)
ALT SERPL W P-5'-P-CCNC: 13 U/L (ref 7–45)
ANION GAP SERPL CALC-SCNC: 12 MMOL/L (ref 10–20)
AST SERPL W P-5'-P-CCNC: 17 U/L (ref 9–39)
BASOPHILS # BLD AUTO: 0.05 X10*3/UL (ref 0–0.1)
BASOPHILS NFR BLD AUTO: 0.9 %
BILIRUB SERPL-MCNC: 0.4 MG/DL (ref 0–1.2)
BUN SERPL-MCNC: 18 MG/DL (ref 6–23)
CALCIUM SERPL-MCNC: 10 MG/DL (ref 8.6–10.3)
CANCER AG27-29 SERPL-ACNC: 49.5 U/ML (ref 0–38.6)
CHLORIDE SERPL-SCNC: 98 MMOL/L (ref 98–107)
CO2 SERPL-SCNC: 27 MMOL/L (ref 21–32)
CREAT SERPL-MCNC: 0.83 MG/DL (ref 0.5–1.05)
EGFRCR SERPLBLD CKD-EPI 2021: 71 ML/MIN/1.73M*2
EOSINOPHIL # BLD AUTO: 0.59 X10*3/UL (ref 0–0.4)
EOSINOPHIL NFR BLD AUTO: 11.1 %
ERYTHROCYTE [DISTWIDTH] IN BLOOD BY AUTOMATED COUNT: 13.6 % (ref 11.5–14.5)
GLUCOSE SERPL-MCNC: 94 MG/DL (ref 74–99)
HCT VFR BLD AUTO: 41.4 % (ref 36–46)
HGB BLD-MCNC: 13.8 G/DL (ref 12–16)
IMM GRANULOCYTES # BLD AUTO: 0 X10*3/UL (ref 0–0.5)
IMM GRANULOCYTES NFR BLD AUTO: 0 % (ref 0–0.9)
LYMPHOCYTES # BLD AUTO: 1.48 X10*3/UL (ref 0.8–3)
LYMPHOCYTES NFR BLD AUTO: 27.9 %
MCH RBC QN AUTO: 31.4 PG (ref 26–34)
MCHC RBC AUTO-ENTMCNC: 33.3 G/DL (ref 32–36)
MCV RBC AUTO: 94 FL (ref 80–100)
MONOCYTES # BLD AUTO: 0.65 X10*3/UL (ref 0.05–0.8)
MONOCYTES NFR BLD AUTO: 12.3 %
NEUTROPHILS # BLD AUTO: 2.53 X10*3/UL (ref 1.6–5.5)
NEUTROPHILS NFR BLD AUTO: 47.8 %
PLATELET # BLD AUTO: 299 X10*3/UL (ref 150–450)
POTASSIUM SERPL-SCNC: 4.2 MMOL/L (ref 3.5–5.3)
PROT SERPL-MCNC: 6.9 G/DL (ref 6.4–8.2)
RBC # BLD AUTO: 4.4 X10*6/UL (ref 4–5.2)
SODIUM SERPL-SCNC: 133 MMOL/L (ref 136–145)
WBC # BLD AUTO: 5.3 X10*3/UL (ref 4.4–11.3)

## 2024-02-09 PROCEDURE — 80053 COMPREHEN METABOLIC PANEL: CPT

## 2024-02-09 PROCEDURE — 86300 IMMUNOASSAY TUMOR CA 15-3: CPT

## 2024-02-09 PROCEDURE — 36415 COLL VENOUS BLD VENIPUNCTURE: CPT

## 2024-02-09 PROCEDURE — 87186 SC STD MICRODIL/AGAR DIL: CPT

## 2024-02-09 PROCEDURE — 85025 COMPLETE CBC W/AUTO DIFF WBC: CPT

## 2024-02-09 PROCEDURE — 87086 URINE CULTURE/COLONY COUNT: CPT

## 2024-02-10 ENCOUNTER — LAB REQUISITION (OUTPATIENT)
Dept: LAB | Facility: HOSPITAL | Age: 79
End: 2024-02-10
Payer: MEDICARE

## 2024-02-10 DIAGNOSIS — N39.0 URINARY TRACT INFECTION, SITE NOT SPECIFIED: ICD-10-CM

## 2024-02-12 LAB — BACTERIA UR CULT: ABNORMAL

## 2024-02-16 ENCOUNTER — OFFICE VISIT (OUTPATIENT)
Dept: HEMATOLOGY/ONCOLOGY | Facility: CLINIC | Age: 79
End: 2024-02-16
Payer: MEDICARE

## 2024-02-16 VITALS
RESPIRATION RATE: 18 BRPM | BODY MASS INDEX: 30.94 KG/M2 | OXYGEN SATURATION: 93 % | TEMPERATURE: 98.2 F | HEART RATE: 65 BPM | WEIGHT: 145.5 LBS | SYSTOLIC BLOOD PRESSURE: 188 MMHG | DIASTOLIC BLOOD PRESSURE: 78 MMHG

## 2024-02-16 DIAGNOSIS — I10 BENIGN ESSENTIAL HYPERTENSION: ICD-10-CM

## 2024-02-16 DIAGNOSIS — E06.3 HYPOTHYROIDISM DUE TO HASHIMOTO'S THYROIDITIS: ICD-10-CM

## 2024-02-16 DIAGNOSIS — C50.912 MALIGNANT NEOPLASM OF LEFT FEMALE BREAST, UNSPECIFIED ESTROGEN RECEPTOR STATUS, UNSPECIFIED SITE OF BREAST (MULTI): Primary | ICD-10-CM

## 2024-02-16 DIAGNOSIS — I25.10 CORONARY ARTERY DISEASE INVOLVING NATIVE CORONARY ARTERY OF NATIVE HEART WITHOUT ANGINA PECTORIS: ICD-10-CM

## 2024-02-16 DIAGNOSIS — E03.8 HYPOTHYROIDISM DUE TO HASHIMOTO'S THYROIDITIS: ICD-10-CM

## 2024-02-16 DIAGNOSIS — E78.5 HYPERLIPIDEMIA, UNSPECIFIED HYPERLIPIDEMIA TYPE: ICD-10-CM

## 2024-02-16 PROCEDURE — 99214 OFFICE O/P EST MOD 30 MIN: CPT | Performed by: INTERNAL MEDICINE

## 2024-02-16 PROCEDURE — 1160F RVW MEDS BY RX/DR IN RCRD: CPT | Performed by: INTERNAL MEDICINE

## 2024-02-16 PROCEDURE — 1159F MED LIST DOCD IN RCRD: CPT | Performed by: INTERNAL MEDICINE

## 2024-02-16 PROCEDURE — 1036F TOBACCO NON-USER: CPT | Performed by: INTERNAL MEDICINE

## 2024-02-16 PROCEDURE — 3077F SYST BP >= 140 MM HG: CPT | Performed by: INTERNAL MEDICINE

## 2024-02-16 PROCEDURE — 3078F DIAST BP <80 MM HG: CPT | Performed by: INTERNAL MEDICINE

## 2024-02-16 PROCEDURE — 1126F AMNT PAIN NOTED NONE PRSNT: CPT | Performed by: INTERNAL MEDICINE

## 2024-02-16 ASSESSMENT — PAIN SCALES - GENERAL: PAINLEVEL: 0-NO PAIN

## 2024-02-16 NOTE — PROGRESS NOTES
Patient ID: nAa Vega is a 78 y.o. female.  Referring Physician: No referring provider defined for this encounter.  Primary Care Provider: Jose Corea DO  Visit Type: Follow Up      Subjective    HPI I have been getting a lot of UTIs    Review of Systems   Constitutional: Negative.    HENT:  Negative.     Eyes: Negative.    Respiratory: Negative.     Cardiovascular: Negative.    Gastrointestinal: Negative.    Endocrine: Negative.    Genitourinary:  Positive for dysuria and frequency.    Musculoskeletal: Negative.    Skin: Negative.    Neurological: Negative.    Hematological: Negative.    Psychiatric/Behavioral: Negative.          Objective   BSA: 1.64 meters squared  BP (!) 188/78 (BP Location: Right arm)   Pulse 65   Temp 36.8 °C (98.2 °F) (Temporal)   Resp 18   Wt 66 kg (145 lb 8.1 oz)   SpO2 93%   BMI 30.94 kg/m²      has a past medical history of Encounter for fitting and adjustment of other specified devices, Encounter for screening for malignant neoplasm of cervix (12/01/2013), Other abnormal and inconclusive findings on diagnostic imaging of breast (02/01/2016), Person consulting for explanation of examination or test findings, Personal history of other diseases of the musculoskeletal system and connective tissue, Personal history of other diseases of urinary system (05/17/2022), Personal history of other endocrine, nutritional and metabolic disease (06/20/2019), Personal history of other medical treatment, and Personal history of pulmonary embolism.   has a past surgical history that includes Cataract extraction (03/26/2014); Colonoscopy (03/26/2014); US guided thyroid biopsy (12/23/2014); and Breast lumpectomy (06/06/2016).  Family History   Problem Relation Name Age of Onset    Other (angina pectoris) Mother      Coronary artery disease Mother      Hypertension Mother      Macular degeneration Mother      Emphysema Father      Rheum arthritis Father      Other (acute motor and sensory  axonal neuropathy) Sister      Coronary artery disease Brother       Oncology History    No history exists.       Ana Vega  reports that she has quit smoking. Her smoking use included cigarettes. She has never used smokeless tobacco.  She  reports that she does not currently use alcohol.  She  reports that she does not currently use drugs.    Physical Exam  Vitals reviewed.   Constitutional:       Appearance: Normal appearance.   HENT:      Head: Normocephalic.      Mouth/Throat:      Mouth: Mucous membranes are moist.   Eyes:      Extraocular Movements: Extraocular movements intact.      Pupils: Pupils are equal, round, and reactive to light.   Cardiovascular:      Rate and Rhythm: Normal rate and regular rhythm.      Heart sounds: Normal heart sounds.   Pulmonary:      Breath sounds: Normal breath sounds.   Abdominal:      General: Bowel sounds are normal.      Palpations: Abdomen is soft.   Musculoskeletal:         General: Normal range of motion.      Cervical back: Normal range of motion and neck supple.   Skin:     General: Skin is warm.   Neurological:      General: No focal deficit present.      Mental Status: She is alert and oriented to person, place, and time.   Psychiatric:         Mood and Affect: Mood normal.         Behavior: Behavior normal.         WBC   Date/Time Value Ref Range Status   02/09/2024 10:47 AM 5.3 4.4 - 11.3 x10*3/uL Final   10/04/2023 09:02 AM 4.8 4.4 - 11.3 x10*3/uL Final   02/03/2023 10:48 AM 5.0 4.4 - 11.3 x10E9/L Final   05/17/2022 10:05 AM 8.8 4.4 - 11.3 x10E9/L Final   05/04/2022 02:15 PM 5.3 4.4 - 11.3 x10E9/L Final     nRBC   Date Value Ref Range Status   10/04/2023 0.0 0.0 - 0.0 /100 WBCs Final   05/04/2022 0.0 0.0 - 0.0 /100 WBC Final   12/23/2019 0.0 0.0 - 0.0 /100 WBC Final   11/06/2019 0.0 0.0 - 0.0 /100 WBC Final     RBC   Date Value Ref Range Status   02/09/2024 4.40 4.00 - 5.20 x10*6/uL Final   10/04/2023 4.67 4.00 - 5.20 x10*6/uL Final   02/03/2023 4.18 4.00  - 5.20 x10E12/L Final   05/17/2022 4.65 4.00 - 5.20 x10E12/L Final   05/04/2022 4.22 4.00 - 5.20 x10E12/L Final     Hemoglobin   Date Value Ref Range Status   02/09/2024 13.8 12.0 - 16.0 g/dL Final   10/04/2023 14.3 12.0 - 16.0 g/dL Final   02/03/2023 12.7 12.0 - 16.0 g/dL Final   05/17/2022 14.0 12.0 - 16.0 g/dL Final   05/04/2022 12.5 12.0 - 16.0 g/dL Final     Hematocrit   Date Value Ref Range Status   02/09/2024 41.4 36.0 - 46.0 % Final   10/04/2023 43.0 36.0 - 46.0 % Final   02/03/2023 38.7 36.0 - 46.0 % Final   05/17/2022 43.7 36.0 - 46.0 % Final   05/04/2022 38.9 36.0 - 46.0 % Final     MCV   Date/Time Value Ref Range Status   02/09/2024 10:47 AM 94 80 - 100 fL Final   10/04/2023 09:02 AM 92 80 - 100 fL Final   02/03/2023 10:48 AM 93 80 - 100 fL Final   05/17/2022 10:05 AM 94 80 - 100 fL Final   05/04/2022 02:15 PM 92 80 - 100 fL Final     MCH   Date/Time Value Ref Range Status   02/09/2024 10:47 AM 31.4 26.0 - 34.0 pg Final   10/04/2023 09:02 AM 30.6 26.0 - 34.0 pg Final     MCHC   Date/Time Value Ref Range Status   02/09/2024 10:47 AM 33.3 32.0 - 36.0 g/dL Final   10/04/2023 09:02 AM 33.3 32.0 - 36.0 g/dL Final   02/03/2023 10:48 AM 32.8 32.0 - 36.0 g/dL Final   05/17/2022 10:05 AM 32.0 32.0 - 36.0 g/dL Final   05/04/2022 02:15 PM 32.1 32.0 - 36.0 g/dL Final     RDW   Date/Time Value Ref Range Status   02/09/2024 10:47 AM 13.6 11.5 - 14.5 % Final   10/04/2023 09:02 AM 13.7 11.5 - 14.5 % Final   02/03/2023 10:48 AM 13.1 11.5 - 14.5 % Final   05/17/2022 10:05 AM 13.6 11.5 - 14.5 % Final   05/04/2022 02:15 PM 13.9 11.5 - 14.5 % Final     Platelets   Date/Time Value Ref Range Status   02/09/2024 10:47  150 - 450 x10*3/uL Final   10/04/2023 09:02  150 - 450 x10*3/uL Final   02/03/2023 10:48  150 - 450 x10E9/L Final   05/17/2022 10:05  150 - 450 x10E9/L Final   05/04/2022 02:15  150 - 450 x10E9/L Final     MPV   Date/Time Value Ref Range Status   10/04/2023 09:02 AM 9.7 7.5 - 11.5 fL  Final     Neutrophils %   Date/Time Value Ref Range Status   02/09/2024 10:47 AM 47.8 40.0 - 80.0 % Final   10/04/2023 09:02 AM 58.3 40.0 - 80.0 % Final   02/03/2023 10:48 AM 54.3 40.0 - 80.0 % Final   05/17/2022 10:05 AM 73.0 40.0 - 80.0 % Final   05/04/2022 02:15 PM 53.2 40.0 - 80.0 % Final     Immature Granulocytes %, Automated   Date/Time Value Ref Range Status   02/09/2024 10:47 AM 0.0 0.0 - 0.9 % Final     Comment:     Immature Granulocyte Count (IG) includes promyelocytes, myelocytes and metamyelocytes but does not include bands. Percent differential counts (%) should be interpreted in the context of the absolute cell counts (cells/UL).   10/04/2023 09:02 AM 0.2 0.0 - 0.9 % Final     Comment:     Immature Granulocyte Count (IG) includes promyelocytes, myelocytes and metamyelocytes but does not include bands. Percent differential counts (%) should be interpreted in the context of the absolute cell counts (cells/UL).   02/03/2023 10:48 AM 0.0 0.0 - 0.9 % Final     Comment:      Immature Granulocyte Count (IG) includes promyelocytes,    myelocytes and metamyelocytes but does not include bands.   Percent differential counts (%) should be interpreted in the   context of the absolute cell counts (cells/L).     05/17/2022 10:05 AM 0.3 0.0 - 0.9 % Final     Comment:      Immature Granulocyte Count (IG) includes promyelocytes,    myelocytes and metamyelocytes but does not include bands.   Percent differential counts (%) should be interpreted in the   context of the absolute cell counts (cells/L).     05/04/2022 02:15 PM 0.2 0.0 - 0.9 % Final     Comment:      Immature Granulocyte Count (IG) includes promyelocytes,    myelocytes and metamyelocytes but does not include bands.   Percent differential counts (%) should be interpreted in the   context of the absolute cell counts (cells/L).       Lymphocytes %   Date/Time Value Ref Range Status   02/09/2024 10:47 AM 27.9 13.0 - 44.0 % Final   10/04/2023 09:02 AM 25.1 13.0 -  44.0 % Final   02/03/2023 10:48 AM 27.7 13.0 - 44.0 % Final   05/17/2022 10:05 AM 13.3 13.0 - 44.0 % Final   05/04/2022 02:15 PM 28.3 13.0 - 44.0 % Final     Monocytes %   Date/Time Value Ref Range Status   02/09/2024 10:47 AM 12.3 2.0 - 10.0 % Final   10/04/2023 09:02 AM 12.2 2.0 - 10.0 % Final   02/03/2023 10:48 AM 11.1 2.0 - 10.0 % Final   05/17/2022 10:05 AM 9.6 2.0 - 10.0 % Final   05/04/2022 02:15 PM 11.1 2.0 - 10.0 % Final     Eosinophils %   Date/Time Value Ref Range Status   02/09/2024 10:47 AM 11.1 0.0 - 6.0 % Final   10/04/2023 09:02 AM 3.4 0.0 - 6.0 % Final   02/03/2023 10:48 AM 6.1 0.0 - 6.0 % Final   05/17/2022 10:05 AM 3.5 0.0 - 6.0 % Final   05/04/2022 02:15 PM 6.6 0.0 - 6.0 % Final     Basophils %   Date/Time Value Ref Range Status   02/09/2024 10:47 AM 0.9 0.0 - 2.0 % Final   10/04/2023 09:02 AM 0.8 0.0 - 2.0 % Final   02/03/2023 10:48 AM 0.8 0.0 - 2.0 % Final   05/17/2022 10:05 AM 0.3 0.0 - 2.0 % Final   05/04/2022 02:15 PM 0.6 0.0 - 2.0 % Final     Neutrophils Absolute   Date/Time Value Ref Range Status   02/09/2024 10:47 AM 2.53 1.60 - 5.50 x10*3/uL Final     Comment:     Percent differential counts (%) should be interpreted in the context of the absolute cell counts (cells/uL).   10/04/2023 09:02 AM 2.77 1.60 - 5.50 x10*3/uL Final     Comment:     Percent differential counts (%) should be interpreted in the context of the absolute cell counts (cells/uL).   02/03/2023 10:48 AM 2.69 1.60 - 5.50 x10E9/L Final   05/17/2022 10:05 AM 6.43 (H) 1.60 - 5.50 x10E9/L Final   05/04/2022 02:15 PM 2.82 1.60 - 5.50 x10E9/L Final     Immature Granulocytes Absolute, Automated   Date/Time Value Ref Range Status   02/09/2024 10:47 AM 0.00 0.00 - 0.50 x10*3/uL Final   10/04/2023 09:02 AM 0.01 0.00 - 0.50 x10*3/uL Final     Lymphocytes Absolute   Date/Time Value Ref Range Status   02/09/2024 10:47 AM 1.48 0.80 - 3.00 x10*3/uL Final   10/04/2023 09:02 AM 1.19 0.80 - 3.00 x10*3/uL Final   02/03/2023 10:48 AM 1.37  "0.80 - 3.00 x10E9/L Final   05/17/2022 10:05 AM 1.17 0.80 - 3.00 x10E9/L Final   05/04/2022 02:15 PM 1.50 0.80 - 3.00 x10E9/L Final     Monocytes Absolute   Date/Time Value Ref Range Status   02/09/2024 10:47 AM 0.65 0.05 - 0.80 x10*3/uL Final   10/04/2023 09:02 AM 0.58 0.05 - 0.80 x10*3/uL Final   02/03/2023 10:48 AM 0.55 0.05 - 0.80 x10E9/L Final   05/17/2022 10:05 AM 0.85 (H) 0.05 - 0.80 x10E9/L Final   05/04/2022 02:15 PM 0.59 0.05 - 0.80 x10E9/L Final     Eosinophils Absolute   Date/Time Value Ref Range Status   02/09/2024 10:47 AM 0.59 (H) 0.00 - 0.40 x10*3/uL Final   10/04/2023 09:02 AM 0.16 0.00 - 0.40 x10*3/uL Final   02/03/2023 10:48 AM 0.30 0.00 - 0.40 x10E9/L Final   05/17/2022 10:05 AM 0.31 0.00 - 0.40 x10E9/L Final   05/04/2022 02:15 PM 0.35 0.00 - 0.40 x10E9/L Final     Basophils Absolute   Date/Time Value Ref Range Status   02/09/2024 10:47 AM 0.05 0.00 - 0.10 x10*3/uL Final   10/04/2023 09:02 AM 0.04 0.00 - 0.10 x10*3/uL Final   02/03/2023 10:48 AM 0.04 0.00 - 0.10 x10E9/L Final   05/17/2022 10:05 AM 0.03 0.00 - 0.10 x10E9/L Final   05/04/2022 02:15 PM 0.03 0.00 - 0.10 x10E9/L Final       No components found for: \"PT\"  No results found for: \"APTT\"  Medication Documentation Review Audit       Reviewed by Sommer Atkinson MA (Medical Assistant) on 02/16/24 at 1033      Medication Order Taking? Sig Documenting Provider Last Dose Status   amLODIPine (Norvasc) 10 mg tablet [id number] Yes Take 1 tablet (10 mg) by mouth once daily. Historical Provider, MD Taking Active   aspirin 81 mg EC tablet [id number] Yes Take 1 tablet (81 mg) by mouth. Historical Provider, MD Taking Active   calcium carbonate 600 mg calcium (1,500 mg) tablet [id number] Yes Take 1 tablet (600 mg) by mouth once daily. Historical Provider, MD Taking Active   gabapentin (Neurontin) 300 mg capsule [id number] Yes TAKE 1 CAPSULE EVERY       MORNING, 1 CAPSULE EVERY   EVENING, AND 1 CAPSULE     BEFORE BEDTIME Jose Corea, DO Taking Active " "  isosorbide mononitrate ER (Imdur) 60 mg 24 hr tablet [id number] Yes Take 1 tablet (60 mg) by mouth once daily. Historical Provider, MD Taking Active   losartan (Cozaar) 50 mg tablet [id number] Yes Take 1 tablet (50 mg) by mouth once daily. Historical Provider, MD Taking Active   metoprolol tartrate (Lopressor) 50 mg tablet [id number] Yes Take 1 tablet by mouth 2 times a day. Historical Provider, MD Taking Active   multivitamin tablet [id number] Yes Take 1 tablet by mouth once daily. Historical Provider, MD Taking Active   nitroglycerin (Nitrostat) 0.4 mg SL tablet [id number] Yes Place 1 tablet (0.4 mg) under the tongue. Historical Provider, MD Taking Active   omega 3-dha-epa-fish oil 1,200 (144-216) mg capsule [id number] Yes Take 1 capsule (1,200 mg) by mouth once daily. Historical Provider, MD Taking Active   omeprazole (PriLOSEC) 40 mg DR capsule [id number]  TAKE 1 CAPSULE DAILY Jose Corea DO  Active   rosuvastatin (Crestor) 20 mg tablet [id number] Yes Take 1 tablet (20 mg) by mouth once daily. Historical Provider, MD Taking Active   Synthroid 50 mcg tablet [id number] Yes TAKE 1 TABLET DAILY Jose Corea DO Taking Active                   Assessment/Plan    1) breast cancer  -diagnosed with left sided breast cancer in 2016  -s/p lumpectomy, 2.2 cm, 1 out of 10 LN+, grade 3, weakly ER+ on bx  -oncotype DX 49%. Triple negative (on final path)  -s/p TC x 4 followed by radiation, s/p arimidex, then femara, then aromasin, none of which she could tolerate  -here for interval followup  -has had frequent UTIs in the last 6 months  -labs done on 2/9/2024 included CBC, COMP, CA 27.29  -results reviewed: wbc 5.3, hgb 13.8, plt 299,000, creatinine 0.83, AST 17, ALT 13, CA 27.29 49.5  -has had a \"sensitive\" area in lateral area of left breast in 3 o'clock position  -last mammo done on 2/3/2023 was negative  -limited physical exam was done today--no cervical, supraclavicular, axillary adenopathy  -was supposed to have had her mammo " done before this appointment but because of upper respiratory infection, she had to reschedule--will now be done on 3/7/2024  -will see her again in 1 year, after next mammo is done      2) HTN  -on metoprolol  -on losartan  -on amlodipine     3) CAD  -on imdur  -on ASA     4) Hypothyroidism  on levothyroxine     5) hyperlipidemia  -On Crestor     Problem List Items Addressed This Visit             ICD-10-CM    Breast cancer (CMS/HCC) - Primary C50.919    Relevant Orders    Clinic Appointment Request Follow Up; JOSE LUIS ORTEGA; [facility]    CBC and Auto Differential    Comprehensive metabolic panel    Cancer Antigen 27-29            Jose Luis Ortega MD

## 2024-02-19 ENCOUNTER — TELEPHONE (OUTPATIENT)
Dept: HEMATOLOGY/ONCOLOGY | Facility: CLINIC | Age: 79
End: 2024-02-19
Payer: MEDICARE

## 2024-02-20 DIAGNOSIS — C50.112 MALIGNANT NEOPLASM OF CENTRAL PORTION OF LEFT BREAST IN FEMALE, ESTROGEN RECEPTOR POSITIVE (MULTI): Primary | ICD-10-CM

## 2024-02-20 DIAGNOSIS — Z17.0 MALIGNANT NEOPLASM OF CENTRAL PORTION OF LEFT BREAST IN FEMALE, ESTROGEN RECEPTOR POSITIVE (MULTI): Primary | ICD-10-CM

## 2024-02-29 ASSESSMENT — ENCOUNTER SYMPTOMS
ENDOCRINE NEGATIVE: 1
CONSTITUTIONAL NEGATIVE: 1
HEMATOLOGIC/LYMPHATIC NEGATIVE: 1
CARDIOVASCULAR NEGATIVE: 1
NEUROLOGICAL NEGATIVE: 1
PSYCHIATRIC NEGATIVE: 1
FREQUENCY: 1
DYSURIA: 1
GASTROINTESTINAL NEGATIVE: 1
RESPIRATORY NEGATIVE: 1
MUSCULOSKELETAL NEGATIVE: 1
EYES NEGATIVE: 1

## 2024-03-05 ENCOUNTER — OFFICE VISIT (OUTPATIENT)
Dept: PRIMARY CARE | Facility: CLINIC | Age: 79
End: 2024-03-05
Payer: MEDICARE

## 2024-03-05 VITALS
BODY MASS INDEX: 31.41 KG/M2 | DIASTOLIC BLOOD PRESSURE: 64 MMHG | WEIGHT: 147.71 LBS | SYSTOLIC BLOOD PRESSURE: 158 MMHG | TEMPERATURE: 98.3 F

## 2024-03-05 DIAGNOSIS — R39.9 UTI SYMPTOMS: Primary | ICD-10-CM

## 2024-03-05 DIAGNOSIS — N39.0 URINARY TRACT INFECTION WITHOUT HEMATURIA, SITE UNSPECIFIED: ICD-10-CM

## 2024-03-05 LAB
POC BILIRUBIN, URINE: NEGATIVE
POC BLOOD, URINE: ABNORMAL
POC GLUCOSE, URINE: NEGATIVE MG/DL
POC KETONES, URINE: NEGATIVE MG/DL
POC LEUKOCYTES, URINE: ABNORMAL
POC NITRITE,URINE: NEGATIVE
POC PH, URINE: 5.5 PH
POC PROTEIN, URINE: ABNORMAL MG/DL
POC SPECIFIC GRAVITY, URINE: 1.02
POC UROBILINOGEN, URINE: 0.2 EU/DL

## 2024-03-05 PROCEDURE — 3077F SYST BP >= 140 MM HG: CPT | Performed by: FAMILY MEDICINE

## 2024-03-05 PROCEDURE — 81003 URINALYSIS AUTO W/O SCOPE: CPT | Performed by: FAMILY MEDICINE

## 2024-03-05 PROCEDURE — 1160F RVW MEDS BY RX/DR IN RCRD: CPT | Performed by: FAMILY MEDICINE

## 2024-03-05 PROCEDURE — 99213 OFFICE O/P EST LOW 20 MIN: CPT | Performed by: FAMILY MEDICINE

## 2024-03-05 PROCEDURE — 87186 SC STD MICRODIL/AGAR DIL: CPT

## 2024-03-05 PROCEDURE — 3078F DIAST BP <80 MM HG: CPT | Performed by: FAMILY MEDICINE

## 2024-03-05 PROCEDURE — 1036F TOBACCO NON-USER: CPT | Performed by: FAMILY MEDICINE

## 2024-03-05 PROCEDURE — 1159F MED LIST DOCD IN RCRD: CPT | Performed by: FAMILY MEDICINE

## 2024-03-05 PROCEDURE — 87086 URINE CULTURE/COLONY COUNT: CPT

## 2024-03-05 PROCEDURE — 1126F AMNT PAIN NOTED NONE PRSNT: CPT | Performed by: FAMILY MEDICINE

## 2024-03-05 RX ORDER — NITROFURANTOIN 25; 75 MG/1; MG/1
100 CAPSULE ORAL 2 TIMES DAILY
Qty: 14 CAPSULE | Refills: 0 | Status: SHIPPED | OUTPATIENT
Start: 2024-03-05 | End: 2024-03-12

## 2024-03-05 ASSESSMENT — ENCOUNTER SYMPTOMS: DEPRESSION: 0

## 2024-03-05 ASSESSMENT — PATIENT HEALTH QUESTIONNAIRE - PHQ9
2. FEELING DOWN, DEPRESSED OR HOPELESS: NOT AT ALL
SUM OF ALL RESPONSES TO PHQ9 QUESTIONS 1 AND 2: 0
1. LITTLE INTEREST OR PLEASURE IN DOING THINGS: NOT AT ALL

## 2024-03-05 NOTE — PROGRESS NOTES
Subjective   Patient ID: [id number]     Ana Vega is a 78 y.o. female who presents for UTI Symptoms.  HPI  She complains of UTI symptoms.  Onset today, she has had pressure and urination every five minutes.  This is the third UTI this year.      She takes exercise classes once a week in the pool.      No fever.        Objective     /64 (BP Location: Right arm, Patient Position: Sitting)   Temp 36.8 °C (98.3 °F) (Skin)   Wt 67 kg (147 lb 11.3 oz)   BMI 31.41 kg/m²      Physical Exam  Cardiovascular:      Rate and Rhythm: Normal rate and regular rhythm.      Heart sounds: Normal heart sounds.   Pulmonary:      Effort: Pulmonary effort is normal.      Breath sounds: Normal breath sounds.   Abdominal:      General: Abdomen is flat.      Palpations: Abdomen is soft.      Tenderness: There is no abdominal tenderness.   Neurological:      Mental Status: She is alert.         Assessment/Plan   Problem List Items Addressed This Visit       UTI symptoms - Primary    Relevant Medications    nitrofurantoin, macrocrystal-monohydrate, (Macrobid) 100 mg capsule    Other Relevant Orders    POCT UA Automated manually resulted    Urine Culture    Referral to Urology    UTI (urinary tract infection)    Relevant Medications    nitrofurantoin, macrocrystal-monohydrate, (Macrobid) 100 mg capsule    Other Relevant Orders    Urine Culture    Referral to Urology   I will order an antibiotic.  I will order a urologist referral.  A urine culture was ordered.      Jose Corea DO

## 2024-03-07 ENCOUNTER — HOSPITAL ENCOUNTER (OUTPATIENT)
Dept: RADIOLOGY | Facility: HOSPITAL | Age: 79
Discharge: HOME | End: 2024-03-07
Payer: MEDICARE

## 2024-03-07 DIAGNOSIS — C50.112 MALIGNANT NEOPLASM OF CENTRAL PORTION OF LEFT BREAST IN FEMALE, ESTROGEN RECEPTOR POSITIVE (MULTI): ICD-10-CM

## 2024-03-07 DIAGNOSIS — Z17.0 MALIGNANT NEOPLASM OF CENTRAL PORTION OF LEFT BREAST IN FEMALE, ESTROGEN RECEPTOR POSITIVE (MULTI): ICD-10-CM

## 2024-03-07 PROCEDURE — 77066 DX MAMMO INCL CAD BI: CPT | Performed by: RADIOLOGY

## 2024-03-07 PROCEDURE — G0279 TOMOSYNTHESIS, MAMMO: HCPCS | Performed by: RADIOLOGY

## 2024-03-07 PROCEDURE — 77062 BREAST TOMOSYNTHESIS BI: CPT

## 2024-03-08 LAB — BACTERIA UR CULT: ABNORMAL

## 2024-03-18 ENCOUNTER — TELEPHONE (OUTPATIENT)
Dept: PRIMARY CARE | Facility: CLINIC | Age: 79
End: 2024-03-18
Payer: MEDICARE

## 2024-03-18 DIAGNOSIS — M79.601 PARESTHESIA AND PAIN OF BOTH UPPER EXTREMITIES: ICD-10-CM

## 2024-03-18 DIAGNOSIS — R20.2 PARESTHESIA AND PAIN OF BOTH UPPER EXTREMITIES: ICD-10-CM

## 2024-03-18 DIAGNOSIS — M79.602 PARESTHESIA AND PAIN OF BOTH UPPER EXTREMITIES: ICD-10-CM

## 2024-03-18 RX ORDER — GABAPENTIN 300 MG/1
300 CAPSULE ORAL 3 TIMES DAILY
Qty: 270 CAPSULE | Refills: 1 | Status: SHIPPED | OUTPATIENT
Start: 2024-03-18 | End: 2024-05-24 | Stop reason: SDUPTHER

## 2024-03-18 NOTE — TELEPHONE ENCOUNTER
REFILL REQUEST    Med: Gabapentin   Med Dose: 300 mg  Med Frequency: one cap three times daily    Pharmacy: [place]    LR: 09/15/2024  LV: 03/05/2024  NV: 04/26/2024

## 2024-04-11 ENCOUNTER — APPOINTMENT (OUTPATIENT)
Dept: PRIMARY CARE | Facility: CLINIC | Age: 79
End: 2024-04-11
Payer: MEDICARE

## 2024-04-26 ENCOUNTER — APPOINTMENT (OUTPATIENT)
Dept: PRIMARY CARE | Facility: CLINIC | Age: 79
End: 2024-04-26
Payer: MEDICARE

## 2024-05-07 ENCOUNTER — TELEPHONE (OUTPATIENT)
Dept: PRIMARY CARE | Facility: CLINIC | Age: 79
End: 2024-05-07
Payer: MEDICARE

## 2024-05-07 DIAGNOSIS — E03.9 ACQUIRED HYPOTHYROIDISM: ICD-10-CM

## 2024-05-07 RX ORDER — LEVOTHYROXINE SODIUM 50 UG/1
50 TABLET ORAL DAILY
Qty: 90 TABLET | Refills: 1 | Status: SHIPPED | OUTPATIENT
Start: 2024-05-07

## 2024-05-07 NOTE — TELEPHONE ENCOUNTER
Refill Synthroid 50mg 1 every day     Pharmacy: [phone]    LR: 11/01/23  LV: 03/05/24  NV: 05/24/23

## 2024-05-09 ENCOUNTER — OFFICE VISIT (OUTPATIENT)
Dept: UROLOGY | Facility: CLINIC | Age: 79
End: 2024-05-09
Payer: MEDICARE

## 2024-05-09 VITALS — WEIGHT: 140 LBS | BODY MASS INDEX: 29.39 KG/M2 | HEIGHT: 58 IN

## 2024-05-09 DIAGNOSIS — N39.0 LOWER URINARY TRACT INFECTIOUS DISEASE: ICD-10-CM

## 2024-05-09 DIAGNOSIS — N39.0 URINARY TRACT INFECTION WITHOUT HEMATURIA, SITE UNSPECIFIED: ICD-10-CM

## 2024-05-09 DIAGNOSIS — N39.46 MIXED INCONTINENCE: ICD-10-CM

## 2024-05-09 DIAGNOSIS — R39.9 UTI SYMPTOMS: ICD-10-CM

## 2024-05-09 LAB
POC APPEARANCE, URINE: CLEAR
POC BILIRUBIN, URINE: NEGATIVE
POC BLOOD, URINE: NEGATIVE
POC COLOR, URINE: YELLOW
POC GLUCOSE, URINE: NEGATIVE MG/DL
POC KETONES, URINE: NEGATIVE MG/DL
POC LEUKOCYTES, URINE: NEGATIVE
POC NITRITE,URINE: NEGATIVE
POC PH, URINE: 6 PH
POC PROTEIN, URINE: NEGATIVE MG/DL
POC SPECIFIC GRAVITY, URINE: 1.01
POC UROBILINOGEN, URINE: 0.2 EU/DL

## 2024-05-09 PROCEDURE — 1160F RVW MEDS BY RX/DR IN RCRD: CPT | Performed by: UROLOGY

## 2024-05-09 PROCEDURE — 81003 URINALYSIS AUTO W/O SCOPE: CPT | Performed by: UROLOGY

## 2024-05-09 PROCEDURE — 99204 OFFICE O/P NEW MOD 45 MIN: CPT | Performed by: UROLOGY

## 2024-05-09 PROCEDURE — 1159F MED LIST DOCD IN RCRD: CPT | Performed by: UROLOGY

## 2024-05-09 RX ORDER — DOXYCYCLINE 100 MG/1
100 TABLET ORAL DAILY
COMMUNITY
Start: 2024-04-17 | End: 2024-05-23 | Stop reason: ALTCHOICE

## 2024-05-09 RX ORDER — METOPROLOL TARTRATE 25 MG/1
25 TABLET, FILM COATED ORAL 2 TIMES DAILY
COMMUNITY
Start: 2024-03-29

## 2024-05-09 NOTE — PROGRESS NOTES
Referred by: Jose Corea DO      PCP  Jose Corea DO         CHIEF COMPLAINT:    UTIs            HISTORY OF PRESENT ILLNESS:  Patient's history was reviewed. This is a 78 y.o. female,  who presents with difficulty voiding, slow stream and a sensation of incomplete emptying. Her voiding difficulty has been worse over the last 6 months. She takes OTC painkillers but denies utilization of other painkillers. Denies allergy to betadine/iodine.    The following were reviewed to gain additional history:  Test results: urine cultures reviewed          Specifically, she describes the following pelvic floor symptoms:          Prolapse: No, but feels bladder is low               Incontinence:  No              Urinary Symptoms:       - Frequency:  Yes, mild             # Voids: 6-7x       - Nocturia: Yes             # Voids: 3x       - Urgency:  No       - Incomplete emptying:  Yes - feels like it can take up to 10 minutes, never really feels empty. She Valsalvas and performs several maneuvers (e.g. bending over) to void. She also endorses difficulty urinating in public restrooms because she is uncomfortable.       - Hesitancy:  Yes        - Pain with voiding:  No       - Postvoid dribbling:  Yes       - Fluid intake: lots of water                History:       - Recurrent UTI:  Yes -symptoms include pressure, burning with urination, and one instance of gross hematuria, but no fever, chills, or flank or back pain       - Hematuria:  No       - Stones:  No       - Kidney Disease:  No                  Bowel Symptoms: No       - Regular: Yes       - Diarrhea:No       - Constipation: No       - Fecal Incontinence: No    Past medical and surgical hx reviewed - pertinent for breast cancer and recurrent UTIs. No pelvic or back surgeries. No MS, DM, or other neurologic conditions.    Gyn History:  - Menopausal: Yes           Postmenopausal bleeding: No  - HRT: No  - Hysterectomy: No  - Pap up to date: Yes     History of abnormal pap: No  - Sexually active:  No  Dyspareunia: No   Other issues: None  - Number of prior vaginal deliveries: 2    Number of prior operative deliveries 0    Prior OASI? denies  - Number of prior c-sections: 0    - Mammogram up to date: Yes   - Colonoscopy up to date: Yes     OB History          2    Para   2    Term                AB        Living             SAB        IAB        Ectopic        Multiple        Live Births               Obstetric Comments   2x                Past Medical History:   Diagnosis Date    Breast cancer (Multi)     Left    Encounter for fitting and adjustment of other specified devices     Pessary maintenance    Encounter for screening for malignant neoplasm of cervix 2013    Screening for malignant neoplasm of cervix    Hx antineoplastic chemo     Other abnormal and inconclusive findings on diagnostic imaging of breast 2016    Abnormal mammogram    Person consulting for explanation of examination or test findings     Visit for review of DEXA scan    Personal history of irradiation     Personal history of other diseases of the musculoskeletal system and connective tissue     Personal history of arthritis    Personal history of other diseases of urinary system 2022    History of hematuria    Personal history of other endocrine, nutritional and metabolic disease 2019    History of hypothyroidism    Personal history of other medical treatment     H/O mammogram    Personal history of pulmonary embolism     History of pulmonary embolism       Past Surgical History:   Procedure Laterality Date    BREAST LUMPECTOMY  2016    Breast Surgery Lumpectomy    CATARACT EXTRACTION  2014    Cataract Surgery    COLONOSCOPY  2014    Complete Colonoscopy    US GUIDED THYROID BIOPSY  2014    US GUIDED THYROID BIOPSY 2014 [facility]       Family History   Problem Relation Name Age of Onset    Other (angina  "pectoris) Mother      Coronary artery disease Mother      Hypertension Mother      Macular degeneration Mother      Emphysema Father      Rheum arthritis Father      Other (acute motor and sensory axonal neuropathy) Sister      Coronary artery disease Brother         Review of Systems   Genitourinary:         See HPI   All other systems reviewed and are negative.            PHYSICAL EXAMINATION:  No LMP recorded. Patient is postmenopausal.  Body mass index is 29.26 kg/m².  Visit Vitals  Ht 1.473 m (4' 10\")   Wt 63.5 kg (140 lb)   BMI 29.26 kg/m²   OB Status Postmenopausal   Smoking Status Former   BSA 1.61 m²     NP: Constitutional: alert and in no acute distress, well developed, well nourished.   Head and Face: head and face: unremarkable.   Neck: no neck asymmetry, supple.   Pulmonary: no respiratory distress, unremarkable respiratory effort.   Skin: normal skin color and pigmentation, normal skin turgor, and no rash.   Neurologic: cranial nerves: non-focal, grossly intact.   Psychiatric: alert and oriented x 3.     Pelvic:  Sexual Maturity: Normal.   External Genitalia: Unremarkable.   POP-Q: Aa: -1 Ba: -1 C: -6 Gh: 2.5 Pb: 3 TVL: 7.5 Ap: -2.5 Bp: -2.5 D: -7; small cystocele, but no significant prolapse  Rectum: Unremarkable.   Anus: Unremarkable.   Perianal area: Unremarkable.     PVR (by Ultrasound): 344cc, was only able to void a few drops   Patient was catheterized for 500 ml.     Urine dip:   Recent Results (from the past 6 hour(s))   POCT UA Automated manually resulted    Collection Time: 05/09/24  2:28 PM   Result Value Ref Range    POC Color, Urine Yellow Straw, Yellow, Light-Yellow    POC Appearance, Urine Clear Clear    POC Glucose, Urine NEGATIVE NEGATIVE mg/dl    POC Bilirubin, Urine NEGATIVE NEGATIVE    POC Ketones, Urine NEGATIVE NEGATIVE mg/dl    POC Specific Gravity, Urine 1.015 1.005 - 1.035    POC Blood, Urine NEGATIVE NEGATIVE    POC PH, Urine 6.0 No Reference Range Established PH    POC " Protein, Urine NEGATIVE NEGATIVE, 30 (1+) mg/dl    POC Urobilinogen, Urine 0.2 0.2, 1.0 EU/DL    Poc Nitrite, Urine NEGATIVE NEGATIVE    POC Leukocytes, Urine NEGATIVE NEGATIVE         IMPRESSION AND PLAN:  Ana Vega is a 78 y.o.  who presents with  symptoms.     Problem List Items Addressed This Visit       UTI symptoms    UTI (urinary tract infection)     Other Visit Diagnoses       Lower urinary tract infectious disease        Relevant Orders    POCT UA Automated manually resulted (Completed)    Post-Void Residual (Completed)    Mixed incontinence        Relevant Orders    Post-Void Residual (Completed)           I would like her to complete UDS to further evaluate her urinary symptoms, including retention. We briefly discussed possible treatment options including SNM and ISC. I asked her to follow up with myself to review the results of UDS or sooner as needed.    All questions and concerns were answered and addressed.  The patient expressed understanding and agrees with the plan.       SIGNATURES  Martha Chris MD - URPS Fellow      Scribe Attestation  By signing my name below, IJodie Scrnetta   attest that this documentation has been prepared under the direction and in the presence of Julio César Gordon MD.

## 2024-05-16 ENCOUNTER — PROCEDURE VISIT (OUTPATIENT)
Dept: UROLOGY | Facility: CLINIC | Age: 79
End: 2024-05-16
Payer: MEDICARE

## 2024-05-16 ENCOUNTER — HOSPITAL ENCOUNTER (OUTPATIENT)
Facility: HOSPITAL | Age: 79
Setting detail: OUTPATIENT SURGERY
End: 2024-05-16
Attending: UROLOGY | Admitting: UROLOGY
Payer: MEDICARE

## 2024-05-16 ENCOUNTER — APPOINTMENT (OUTPATIENT)
Dept: UROLOGY | Facility: CLINIC | Age: 79
End: 2024-05-16
Payer: MEDICARE

## 2024-05-16 ENCOUNTER — PREP FOR PROCEDURE (OUTPATIENT)
Dept: UROLOGY | Facility: HOSPITAL | Age: 79
End: 2024-05-16

## 2024-05-16 ENCOUNTER — OFFICE VISIT (OUTPATIENT)
Dept: UROLOGY | Facility: CLINIC | Age: 79
End: 2024-05-16
Payer: MEDICARE

## 2024-05-16 VITALS
DIASTOLIC BLOOD PRESSURE: 73 MMHG | TEMPERATURE: 97.1 F | WEIGHT: 140 LBS | SYSTOLIC BLOOD PRESSURE: 153 MMHG | HEART RATE: 71 BPM | BODY MASS INDEX: 29.26 KG/M2

## 2024-05-16 DIAGNOSIS — R33.9 RETENTION OF URINE: Primary | ICD-10-CM

## 2024-05-16 DIAGNOSIS — R33.9 URINARY RETENTION: Primary | ICD-10-CM

## 2024-05-16 DIAGNOSIS — N39.46 MIXED INCONTINENCE: ICD-10-CM

## 2024-05-16 PROCEDURE — 3077F SYST BP >= 140 MM HG: CPT | Performed by: UROLOGY

## 2024-05-16 PROCEDURE — 99214 OFFICE O/P EST MOD 30 MIN: CPT | Performed by: UROLOGY

## 2024-05-16 PROCEDURE — 1160F RVW MEDS BY RX/DR IN RCRD: CPT | Performed by: UROLOGY

## 2024-05-16 PROCEDURE — 3078F DIAST BP <80 MM HG: CPT | Performed by: UROLOGY

## 2024-05-16 PROCEDURE — 51728 CYSTOMETROGRAM W/VP: CPT | Performed by: UROLOGY

## 2024-05-16 PROCEDURE — 51797 INTRAABDOMINAL PRESSURE TEST: CPT | Performed by: UROLOGY

## 2024-05-16 PROCEDURE — 51741 ELECTRO-UROFLOWMETRY FIRST: CPT | Performed by: UROLOGY

## 2024-05-16 PROCEDURE — 51784 ANAL/URINARY MUSCLE STUDY: CPT | Performed by: UROLOGY

## 2024-05-16 PROCEDURE — 1159F MED LIST DOCD IN RCRD: CPT | Performed by: UROLOGY

## 2024-05-16 RX ORDER — SODIUM CHLORIDE 9 MG/ML
100 INJECTION, SOLUTION INTRAVENOUS CONTINUOUS
OUTPATIENT
Start: 2024-05-16

## 2024-05-16 NOTE — PROGRESS NOTES
Reagansofie Vega 78 y.o. female    Procedures:  Patient referred by Dr. Gordon for urodynamics to evaluate mixed incontinence. Dr. Gordon was present in office at time of study. Patient was unable to void prior to study. Urine was collected via straight cath which was clear for uti today. Patient did not leak on CLPP/VLPP. Patient did not have DO during study. Patient had strong urge to void and attempted to void w/o success. Patient was moved to [facility] and attempted again w/o success. Pvr after study was 241 ml.  Patient instructed to increase fluids if she has any burning or blood in urine. Patient made aware she may have blood vaginally due to abdominal catheter insertion.  Patient understood and consented to urodynamics. Patient will follow up with Dr. Gordon to review results. 05/16/2024/lindsay

## 2024-05-16 NOTE — PROGRESS NOTES
CHIEF COMPLAINT:  Retention    HISTORY OF PRESENT ILLNESS:  1.Retention  2.Chacha   3.Small cystocele    3a. POP-Q: Aa: -1 Ba: -1 C: -6 Gh: 2.5 Pb: 3 TVL: 7.5 Ap: -2.5 Bp: -2.5 D: -7  4.Hx breast CA     Last seen by myself on 24. This is a  78 y.o. female,  who presents for follow-up for retention. She has trouble voiding. When she stands up, leans forward, and waits, she can sometimes produce some urine. She does not want to catheterize if she has other treatment options.      Review of Systems   Constitutional: Negative.    HENT: Negative.     Eyes: Negative.    Respiratory: Negative.     Cardiovascular: Negative.    Gastrointestinal: Negative.    Endocrine: Negative.    Genitourinary:         REFER TO HPI   Musculoskeletal: Negative.    Skin: Negative.    Allergic/Immunologic: Negative.    Neurological: Negative.    Hematological: Negative.    Psychiatric/Behavioral: Negative.         PHYSICAL EXAMINATION:  No LMP recorded. Patient is postmenopausal.  Body mass index is 29.26 kg/m².  Visit Vitals  /73   Pulse 71   Temp 36.2 °C (97.1 °F)   Wt 63.5 kg (140 lb)   BMI 29.26 kg/m²   OB Status Postmenopausal   Smoking Status Former   BSA 1.61 m²     NP: Constitutional: alert and in no acute distress, well developed, well nourished.   Head and Face: head and face: unremarkable.   Neck: no neck asymmetry, supple.   Pulmonary: no respiratory distress, unremarkable respiratory effort.   Skin: normal skin color and pigmentation, normal skin turgor, and no rash.   Neurologic: cranial nerves: non-focal, grossly intact.   Psychiatric: alert and oriented x 3.     I personally reviewed the recent UAs today in clinic. They demonstrated   Component  Ref Range & Units 11 d ago 2 mo ago 4 mo ago 1 yr ago   POC Color, Urine  Straw, Yellow, Light-Yellow Yellow   Light Yellow R   POC Appearance, Urine  Clear Clear   Clear   POC Glucose, Urine  NEGATIVE mg/dl NEGATIVE NEGATIVE NEGATIVE NEGATIVE   POC Bilirubin,  Urine  NEGATIVE NEGATIVE NEGATIVE NEGATIVE NEGATIVE   POC Ketones, Urine  NEGATIVE mg/dl NEGATIVE NEGATIVE NEGATIVE NEGATIVE   POC Specific Gravity, Urine  1.005 - 1.035 1.015 1.025 1.025 1.010   POC Blood, Urine  NEGATIVE NEGATIVE LARGE (3+) Abnormal  MODERATE (2+) Abnormal  NEGATIVE   POC PH, Urine  No Reference Range Established PH 6.0 5.5 7.0 5.5   POC Protein, Urine  NEGATIVE, 30 (1+) mg/dl NEGATIVE >=300 (3+) Abnormal  30 (1+) NEGATIVE   POC Urobilinogen, Urine  0.2, 1.0 EU/DL 0.2 0.2 0.2 0.2   Poc Nitrite, Urine  NEGATIVE NEGATIVE NEGATIVE NEGATIVE NEGATIVE   POC Leukocytes, Urine  NEGATIVE NEGATIVE MODERATE (2+) Abnormal  LARGE (3+) Abnormal  NEGATIVE            IMPRESSION AND PLAN:  1.Retention  2.Chacha   3.Small cystocele    3a. POP-Q: Aa: -1 Ba: -1 C: -6 Gh: 2.5 Pb: 3 TVL: 7.5 Ap: -2.5 Bp: -2.5 D: -7  4.Hx breast CA     Ana Vega is a 78 y.o.  who presents for evaluation of retention.          I personally reviewed the UDS dated 24 with the patient today in clinic. Review of urodynamic testing revealed that there was no uroflow and the patient was catheterized for 250 ml. The  patient last voided 2 hours prior to the study. On CMG, the patient had the first desire to void at 64 cc and strong desire at 127 cc. There was no evidence of detrusor overactivity. There was no evidence of stress urinary incontinence. Patient had a bladder capacity of 241 cc. On pressure flow study, the patient was given permission to void and strained with some change in detrusor pressure, up to 17 cm H2O. However, the patient was not able to void and was catheterized at the conclusion of the study. Results are consistent with underactive bladder as the most likely reason for retention .     We discussed treatment options including SNM and catheterization. Patient would like to avoid ISC/catheterization if she has other treatment options. I described SNM in great detail. Additionally, I explained that there is a  substantial chance that SNM may not improve her retention. Patient would like to proceed with SNM. I asked her to follow up with myself for SNM or sooner as needed.    All questions and concerns were answered and addressed.  The patient expressed understanding and agrees with the plan.       SIGNATURES  Scribe Attestation  By signing my name below, I, Jodie Ewing Scrnetta   attest that this documentation has been prepared under the direction and in the presence of Julio César Gordon MD.

## 2024-05-20 ASSESSMENT — ENCOUNTER SYMPTOMS
GASTROINTESTINAL NEGATIVE: 1
ENDOCRINE NEGATIVE: 1
PSYCHIATRIC NEGATIVE: 1
HEMATOLOGIC/LYMPHATIC NEGATIVE: 1
CONSTITUTIONAL NEGATIVE: 1
RESPIRATORY NEGATIVE: 1
EYES NEGATIVE: 1
CARDIOVASCULAR NEGATIVE: 1
NEUROLOGICAL NEGATIVE: 1
MUSCULOSKELETAL NEGATIVE: 1
ALLERGIC/IMMUNOLOGIC NEGATIVE: 1

## 2024-05-21 ENCOUNTER — LAB (OUTPATIENT)
Dept: LAB | Facility: LAB | Age: 79
End: 2024-05-21
Payer: MEDICARE

## 2024-05-21 ENCOUNTER — PRE-ADMISSION TESTING (OUTPATIENT)
Dept: PREADMISSION TESTING | Facility: HOSPITAL | Age: 79
End: 2024-05-21
Payer: MEDICARE

## 2024-05-21 VITALS
WEIGHT: 145.06 LBS | OXYGEN SATURATION: 95 % | BODY MASS INDEX: 30.45 KG/M2 | SYSTOLIC BLOOD PRESSURE: 154 MMHG | DIASTOLIC BLOOD PRESSURE: 76 MMHG | HEART RATE: 58 BPM | TEMPERATURE: 98.6 F | RESPIRATION RATE: 16 BRPM | HEIGHT: 58 IN

## 2024-05-21 DIAGNOSIS — Z01.818 PRE-OP TESTING: Primary | ICD-10-CM

## 2024-05-21 DIAGNOSIS — R33.9 URINARY RETENTION: ICD-10-CM

## 2024-05-21 LAB
ANION GAP SERPL CALC-SCNC: 9 MMOL/L (ref 10–20)
BUN SERPL-MCNC: 15 MG/DL (ref 6–23)
CALCIUM SERPL-MCNC: 9.4 MG/DL (ref 8.6–10.3)
CHLORIDE SERPL-SCNC: 97 MMOL/L (ref 98–107)
CO2 SERPL-SCNC: 30 MMOL/L (ref 21–32)
CREAT SERPL-MCNC: 0.88 MG/DL (ref 0.5–1.05)
EGFRCR SERPLBLD CKD-EPI 2021: 67 ML/MIN/1.73M*2
ERYTHROCYTE [DISTWIDTH] IN BLOOD BY AUTOMATED COUNT: 13.1 % (ref 11.5–14.5)
GLUCOSE SERPL-MCNC: 114 MG/DL (ref 74–99)
HCT VFR BLD AUTO: 40.4 % (ref 36–46)
HGB BLD-MCNC: 13.3 G/DL (ref 12–16)
MCH RBC QN AUTO: 30.7 PG (ref 26–34)
MCHC RBC AUTO-ENTMCNC: 32.9 G/DL (ref 32–36)
MCV RBC AUTO: 93 FL (ref 80–100)
NRBC BLD-RTO: 0 /100 WBCS (ref 0–0)
PLATELET # BLD AUTO: 229 X10*3/UL (ref 150–450)
POTASSIUM SERPL-SCNC: 4.2 MMOL/L (ref 3.5–5.3)
RBC # BLD AUTO: 4.33 X10*6/UL (ref 4–5.2)
SODIUM SERPL-SCNC: 132 MMOL/L (ref 136–145)
WBC # BLD AUTO: 7.9 X10*3/UL (ref 4.4–11.3)

## 2024-05-21 PROCEDURE — 85027 COMPLETE CBC AUTOMATED: CPT

## 2024-05-21 PROCEDURE — 36415 COLL VENOUS BLD VENIPUNCTURE: CPT

## 2024-05-21 PROCEDURE — 87081 CULTURE SCREEN ONLY: CPT | Mod: STJLAB

## 2024-05-21 PROCEDURE — 93005 ELECTROCARDIOGRAM TRACING: CPT

## 2024-05-21 PROCEDURE — 80048 BASIC METABOLIC PNL TOTAL CA: CPT

## 2024-05-21 RX ORDER — NAPROXEN 250 MG/1
250 TABLET ORAL AS NEEDED
Status: ON HOLD | COMMUNITY
End: 2024-06-10

## 2024-05-21 RX ORDER — CHLORHEXIDINE GLUCONATE ORAL RINSE 1.2 MG/ML
SOLUTION DENTAL
Qty: 473 ML | Refills: 0 | Status: SHIPPED | OUTPATIENT
Start: 2024-05-21

## 2024-05-21 ASSESSMENT — DUKE ACTIVITY SCORE INDEX (DASI)
TOTAL_SCORE: 31.45
CAN YOU DO LIGHT WORK AROUND THE HOUSE LIKE DUSTING OR WASHING DISHES: YES
CAN YOU TAKE CARE OF YOURSELF (EAT, DRESS, BATHE, OR USE TOILET): YES
CAN YOU HAVE SEXUAL RELATIONS: NO
CAN YOU PARTICIPATE IN MODERATE RECREATIONAL ACTIVITIES LIKE GOLF, BOWLING, DANCING, DOUBLES TENNIS OR THROWING A BASEBALL OR FOOTBALL: NO
DASI METS SCORE: 6.6
CAN YOU DO HEAVY WORK AROUND THE HOUSE LIKE SCRUBBING FLOORS OR LIFTING AND MOVING HEAVY FURNITURE: NO
CAN YOU DO YARD WORK LIKE RAKING LEAVES, WEEDING OR PUSHING A MOWER: YES
CAN YOU DO MODERATE WORK AROUND THE HOUSE LIKE VACUUMING, SWEEPING FLOORS OR CARRYING GROCERIES: YES
CAN YOU RUN A SHORT DISTANCE: YES
CAN YOU WALK INDOORS, SUCH AS AROUND YOUR HOUSE: YES
CAN YOU WALK A BLOCK OR TWO ON LEVEL GROUND: YES
CAN YOU PARTICIPATE IN STRENOUS SPORTS LIKE SWIMMING, SINGLES TENNIS, FOOTBALL, BASKETBALL, OR SKIING: NO
CAN YOU CLIMB A FLIGHT OF STAIRS OR WALK UP A HILL: YES

## 2024-05-21 ASSESSMENT — ACTIVITIES OF DAILY LIVING (ADL): ADL_SCORE: 0

## 2024-05-21 ASSESSMENT — PAIN SCALES - GENERAL: PAINLEVEL_OUTOF10: 0 - NO PAIN

## 2024-05-21 ASSESSMENT — LIFESTYLE VARIABLES: SMOKING_STATUS: NONSMOKER

## 2024-05-21 ASSESSMENT — PAIN - FUNCTIONAL ASSESSMENT: PAIN_FUNCTIONAL_ASSESSMENT: 0-10

## 2024-05-21 NOTE — CPM/PAT H&P
CPM/PAT Evaluation       Name: Ana Vega (Ana Vega)  /Age: 1945/78 y.o.     In-Person       Chief Complaint: urinary incontinence    VERNON WINCHESTER is a 77 yo female who has had issues with urinary incontinence and incomplete emptying for years now. Her symptoms have worsened with recurrent UTIs, urinary retention, and small cystocele. She has been following with urology and now scheduled for insertion of continence control stimulator (part 1) on 2024- if results are successful then she is scheduled for permanent placement on 6/10/2024. She wanted to take this route over having to self cath on daily basis. Currently she feels comfortable with last UTI being in 2023. Otherwise denies any recent illness, fever/chills, chest pains or shortness of breath.     Past Medical History:   Diagnosis Date    Breast cancer (Multi) 2016    Left    Coronary artery disease     Encounter for fitting and adjustment of other specified devices     Pessary maintenance    Encounter for screening for malignant neoplasm of cervix 2013    Screening for malignant neoplasm of cervix    Hx antineoplastic chemo     Hyperlipidemia     Hypertension     Hypothyroidism     Other abnormal and inconclusive findings on diagnostic imaging of breast 2016    Abnormal mammogram    Person consulting for explanation of examination or test findings     Visit for review of DEXA scan    Personal history of irradiation     Personal history of other diseases of the musculoskeletal system and connective tissue     Personal history of arthritis    Personal history of other diseases of urinary system 2022    History of hematuria    Personal history of other endocrine, nutritional and metabolic disease 2019    History of hypothyroidism    Personal history of other medical treatment     H/O mammogram    Personal history of pulmonary embolism     History of pulmonary embolism s/p surgery     PCP:   [facility]  Onc/hem: Dr. Ortega  Cards: Dr. Mcpherson    Past Surgical History:   Procedure Laterality Date    BREAST LUMPECTOMY  06/06/2016    Breast Surgery Lumpectomy    BUNIONECTOMY      CAROTID STENT      CATARACT EXTRACTION  03/26/2014    Cataract Surgery    COLONOSCOPY  03/26/2014    Complete Colonoscopy    OTHER SURGICAL HISTORY      US GUIDED THYROID BIOPSY  12/23/2014    US GUIDED THYROID BIOPSY 12/23/2014 [facility] ANCILLARY LEGACY       Patient Sexual activity questions deferred to the physician.    Family History   Problem Relation Name Age of Onset    Other (angina pectoris) Mother      Coronary artery disease Mother      Hypertension Mother      Macular degeneration Mother      Emphysema Father      Rheum arthritis Father      Other (acute motor and sensory axonal neuropathy) Sister      Coronary artery disease Brother         Allergies   Allergen Reactions    Diphenhydramine Hives    Lisinopril Cough       Prior to Admission medications    Medication Sig Start Date End Date Taking? Authorizing Provider   amLODIPine (Norvasc) 10 mg tablet Take 1 tablet (10 mg) by mouth once daily. 11/6/13   Historical Provider, MD   aspirin 81 mg EC tablet Take 1 tablet (81 mg) by mouth. 1/30/19   Historical Provider, MD   calcium carbonate 600 mg calcium (1,500 mg) tablet Take 1 tablet (600 mg) by mouth once daily. 9/14/17   Historical Provider, MD   chlorhexidine (Peridex) 0.12 % solution Swish and spit 15 ml for 2 doses, 15mL the night before surgery and 15 ml morning of surgery - swish for 30 seconds -DO NOT SWALLOW, SPIT OUT 5/21/24   REYNOLD Flaherty-CNP   doxycycline (Adoxa) 100 mg tablet Take 1 tablet (100 mg) by mouth once daily. 4/17/24   Historical Provider, MD   gabapentin (Neurontin) 300 mg capsule Take 1 capsule (300 mg) by mouth 3 times a day. 3/18/24   Jose Corea DO   isosorbide mononitrate ER (Imdur) 60 mg 24 hr tablet Take 1 tablet (60 mg) by mouth once daily. 12/7/15   Historical Provider, MD    levothyroxine (Synthroid) 50 mcg tablet Take 1 tablet (50 mcg) by mouth once daily. 5/7/24   Jose Corea DO   losartan (Cozaar) 50 mg tablet Take 1 tablet (50 mg) by mouth once daily. 4/5/20   Historical Provider, MD   metoprolol tartrate (Lopressor) 25 mg tablet Take 1 tablet (25 mg) by mouth 2 times a day. 3/29/24   Historical Provider, MD   multivitamin tablet Take 1 tablet by mouth once daily.    Historical Provider, MD   nitroglycerin (Nitrostat) 0.4 mg SL tablet Place 1 tablet (0.4 mg) under the tongue. 6/20/19   Historical Provider, MD   omega 3-dha-epa-fish oil 1,200 (144-216) mg capsule Take 1 capsule (1,200 mg) by mouth once daily. 1/30/19   Historical Provider, MD   omeprazole (PriLOSEC) 40 mg DR capsule TAKE 1 CAPSULE DAILY 12/22/23   Jose Corea DO   rosuvastatin (Crestor) 20 mg tablet Take 1 tablet (20 mg) by mouth once daily. 6/20/19   Historical Provider, MD   metoprolol tartrate (Lopressor) 50 mg tablet Take 0.5 tablets by mouth 2 times a day. 3/10/15 5/16/24  Historical Provider, MD MARY ALICE SALMERON   Constitutional: Negative for fever, chills, or sweats   ENMT: Negative for nasal discharge, congestion, ear pain, mouth pain, throat pain   Respiratory: Negative for cough, wheezing, shortness of breath   Cardiac: Negative for chest pain, dyspnea on exertion, palpitations   Gastrointestinal: Negative for nausea, vomiting, diarrhea, constipation, abdominal pain    Genitourinary: Negative for dysuria, flank pain, frequency, hematuria; Positive for urinary stress incontinence and hesitancy    Musculoskeletal: Negative for decreased ROM, pain, swelling, weakness   Neurological: Negative for dizziness, confusion, headache  Psychiatric: Negative for mood changes   Skin: Negative for itching, rash, ulcer    Hematologic/Lymph: Negative for bruising, easy bleeding  Allergic/Immunologic: Negative itching, sneezing, swelling      Physical Exam  HENT:      Head: Normocephalic.      Mouth/Throat:       Mouth: Mucous membranes are moist.   Eyes:      Extraocular Movements: Extraocular movements intact.   Cardiovascular:      Rate and Rhythm: Normal rate and regular rhythm.   Pulmonary:      Effort: Pulmonary effort is normal.      Breath sounds: Normal breath sounds.   Abdominal:      General: Abdomen is flat.      Palpations: Abdomen is soft.   Musculoskeletal:         General: Normal range of motion.      Cervical back: Normal range of motion.   Skin:     General: Skin is warm and dry.   Neurological:      General: No focal deficit present.      Mental Status: She is alert.   Psychiatric:         Mood and Affect: Mood normal.        PAT AIRWAY:   Airway:     Neck ROM::  Full   partials    Anesthesia:  Patient denies any anesthesia complications.     Visit Vitals  /76   Pulse 58   Temp 37 °C (98.6 °F)   Resp 16       DASI Risk Score    No data to display       Caprini DVT Assessment      Flowsheet Row Most Recent Value   DVT Score 5   Current Status Minor surgery planned   History Prior major surgery   Age Over 75 years          Modified Frailty Index    No data to display       CHADS2 Stroke Risk  Current as of about an hour ago        N/A 3 to 100%: High Risk   2 to < 3%: Medium Risk   0 to < 2%: Low Risk     Last Change: N/A          This score determines the patient's risk of having a stroke if the patient has atrial fibrillation.        This score is not applicable to this patient. Components are not calculated.          Revised Cardiac Risk Index    No data to display       Apfel Simplified Score    No data to display       Risk Analysis Index Results This Encounter    No data found in the last 1 encounters.       Stop Bang Score      Flowsheet Row Most Recent Value   Do you snore loudly? 0   Do you often feel tired or fatigued after your sleep? 0   Has anyone ever observed you stop breathing in your sleep? 0   Do you have or are you being treated for high blood pressure? 0   Recent BMI  (Calculated) 30.3   Is BMI greater than 35 kg/m2? 0=No   Age older than 50 years old? 1=Yes   Is your neck circumference greater than 17 inches (Male) or 16 inches (Female)? 0   Gender - Male 0=No   STOP-BANG Total Score 1            Assessment and Plan:     77 yo female scheduled for insertion of continence control stimulator on 5/28/2024 with permanent placement scheduled for 6/10/2024-both surgeries with Dr. Gordon.  Blood work and urine culture ordered along with MRSA-or chlorhexidine prescribed.  EKG shows normal sinus rhythm with ventricular rate 65 bpm-comparable EKGs on file. Otherwise no further orders indicated.     CAD s/p drug eluting coronary stent placement/ HTN  -follows with Dr. Mcpherson, last seen May 2023- stable conditions  -compliant with medication management  -has upcoming appointment in June 2023 for annual f/u    Breast cancer  -diagnosed with left sided breast cancer in 2016 s/p lumpectomy, 2.2 cm  -follows with Dr. Ortega, last seen in Feb 2024    Hypothyroidism  - medication management on levothyroxine     See risk scores as previously documented.

## 2024-05-21 NOTE — PREPROCEDURE INSTRUCTIONS
Medication List            Accurate as of May 21, 2024  1:46 PM. Always use your most recent med list.                amLODIPine 10 mg tablet  Commonly known as: Norvasc  Medication Adjustments for Surgery: Take morning of surgery with sip of water, no other fluids     aspirin 81 mg EC tablet  Medication Adjustments for Surgery: Stop 7 days before surgery     calcium carbonate 600 mg calcium (1,500 mg) tablet  Medication Adjustments for Surgery: Stop 7 days before surgery     chlorhexidine 0.12 % solution  Commonly known as: Peridex  Swish and spit 15 ml for 2 doses, 15mL the night before surgery and 15 ml morning of surgery - swish for 30 seconds -DO NOT SWALLOW, SPIT OUT  Medication Adjustments for Surgery: Other (Comment)  Notes to patient: As indicated     doxycycline 100 mg tablet  Commonly known as: Adoxa  Medication Adjustments for Surgery: Other (Comment)  Notes to patient: May take the morning of surgery if this medication is prescribed to take in the mornings     gabapentin 300 mg capsule  Commonly known as: Neurontin  Take 1 capsule (300 mg) by mouth 3 times a day.  Medication Adjustments for Surgery: Take morning of surgery with sip of water, no other fluids     isosorbide mononitrate ER 60 mg 24 hr tablet  Commonly known as: Imdur  Medication Adjustments for Surgery: Take morning of surgery with sip of water, no other fluids     levothyroxine 50 mcg tablet  Commonly known as: Synthroid  Take 1 tablet (50 mcg) by mouth once daily.  Medication Adjustments for Surgery: Take morning of surgery with sip of water, no other fluids     losartan 50 mg tablet  Commonly known as: Cozaar  Medication Adjustments for Surgery: Other (Comment)  Notes to patient: HOLD any evening dose the night before the day of surgery  HOLD the day of surgery  -should be discontinued for a minimum of 24 hours before surgery     metoprolol tartrate 25 mg tablet  Commonly known as: Lopressor  Medication Adjustments for Surgery: Take  morning of surgery with sip of water, no other fluids     multivitamin tablet  Medication Adjustments for Surgery: Stop 7 days before surgery     naproxen 250 mg tablet  Commonly known as: Naprosyn  Medication Adjustments for Surgery: Stop 7 days before surgery     nitroglycerin 0.4 mg SL tablet  Commonly known as: Nitrostat  Medication Adjustments for Surgery: Other (Comment)  Notes to patient: May use the morning of surgery if needed  -if so, please notify surgeon     omega 3-dha-epa-fish oil 1,200 (144-216) mg capsule  Medication Adjustments for Surgery: Stop 7 days before surgery     omeprazole 40 mg DR capsule  Commonly known as: PriLOSEC  TAKE 1 CAPSULE DAILY  Medication Adjustments for Surgery: Take morning of surgery with sip of water, no other fluids     rosuvastatin 20 mg tablet  Commonly known as: Crestor  Medication Adjustments for Surgery: Other (Comment)  Notes to patient: May take the morning of surgery if this medication is prescribed to take in the mornings                                PRE-OPERATIVE INSTRUCTIONS    You will receive notification one business day prior to your procedure to confirm your arrival time. It is important that you answer your phone and/or check your messages during this time. If you do not hear from the surgery center by 5 pm. the day before your procedure, please call [phone].     Please enter the building through the Outpatient entrance and take the elevator off the lobby to the 2nd floor then check in at the Outpatient Surgery desk on the 2nd floor.    INSTRUCTIONS:  Talk to your surgeon for instructions if you should stop your aspirin, blood thinner, or diabetes medicines.  DO NOT take any multivitamins or over the counter supplements for 7-10 days before surgery.  If not being admitted, you must have an adult immediately available to drive you home after surgery. We also highly recommend you have someone stay with you for the entire day and night of your  surgery.  For children having surgery, a parent or legal guardian must accompany them to the surgery center. If this is not possible, please call [phone] to make additional arrangements.  For adults who are unable to consent or make medical decisions for themselves, a legal guardian or Power of  must accompany them to the surgery center. If this is not possible, please call [phone] to make additional arrangements.  Wear comfortable, loose fitting clothing.  All jewelry and piercings must be removed. If you are unable to remove an item or have a dermal piercing, please be sure to tell the nurse when you arrive for surgery.  Nail polish and make-up must be removed.  Avoid smoking or consuming alcohol for 24 hours before surgery.  To help prevent infection, please take a shower/bath and wash your hair the night before and/or morning of surgery (or follow other specific bathing instructions provided).    Preoperative Fasting Guidelines    Why must I stop eating and drinking near surgery time?  With sedation, food or liquid in your stomach can enter your lungs causing serious complications  Increases nausea and vomiting    When do I need to stop eating and drinking before my surgery?  Do not eat any solid food after midnight the night before your surgery/procedure unless otherwise instructed by your surgeon.   You may have up to 13.5 ounces of clear liquid until TWO hours before your instructed arrival time to the hospital.  This includes water, black tea/coffee, (no milk or cream) apple juice, and electrolyte drinks (Gatorade).   You may chew gum until TWO hours before your surgery/procedure      If applicable, notify your surgeons office immediately of any new skin changes that occur to the surgical limb.      If you have any questions or concerns, please call Pre-Admission Testing at [phone].

## 2024-05-21 NOTE — H&P (VIEW-ONLY)
CPM/PAT Evaluation       Name: Ana Vega (Ana Vega)  /Age: 1945/78 y.o.     In-Person       Chief Complaint: urinary incontinence    VERNON WINCHESTER is a 77 yo female who has had issues with urinary incontinence and incomplete emptying for years now. Her symptoms have worsened with recurrent UTIs, urinary retention, and small cystocele. She has been following with urology and now scheduled for insertion of continence control stimulator (part 1) on 2024- if results are successful then she is scheduled for permanent placement on 6/10/2024. She wanted to take this route over having to self cath on daily basis. Currently she feels comfortable with last UTI being in 2023. Otherwise denies any recent illness, fever/chills, chest pains or shortness of breath.     Past Medical History:   Diagnosis Date    Breast cancer (Multi) 2016    Left    Coronary artery disease     Encounter for fitting and adjustment of other specified devices     Pessary maintenance    Encounter for screening for malignant neoplasm of cervix 2013    Screening for malignant neoplasm of cervix    Hx antineoplastic chemo     Hyperlipidemia     Hypertension     Hypothyroidism     Other abnormal and inconclusive findings on diagnostic imaging of breast 2016    Abnormal mammogram    Person consulting for explanation of examination or test findings     Visit for review of DEXA scan    Personal history of irradiation     Personal history of other diseases of the musculoskeletal system and connective tissue     Personal history of arthritis    Personal history of other diseases of urinary system 2022    History of hematuria    Personal history of other endocrine, nutritional and metabolic disease 2019    History of hypothyroidism    Personal history of other medical treatment     H/O mammogram    Personal history of pulmonary embolism     History of pulmonary embolism s/p surgery     PCP:   Anmol  Onc/hem: Dr. Ortega  Cards: Dr. Mcpherson    Past Surgical History:   Procedure Laterality Date    BREAST LUMPECTOMY  06/06/2016    Breast Surgery Lumpectomy    BUNIONECTOMY      CAROTID STENT      CATARACT EXTRACTION  03/26/2014    Cataract Surgery    COLONOSCOPY  03/26/2014    Complete Colonoscopy    OTHER SURGICAL HISTORY      US GUIDED THYROID BIOPSY  12/23/2014    US GUIDED THYROID BIOPSY 12/23/2014 [facility] ANCILLARY LEGACY       Patient Sexual activity questions deferred to the physician.    Family History   Problem Relation Name Age of Onset    Other (angina pectoris) Mother      Coronary artery disease Mother      Hypertension Mother      Macular degeneration Mother      Emphysema Father      Rheum arthritis Father      Other (acute motor and sensory axonal neuropathy) Sister      Coronary artery disease Brother         Allergies   Allergen Reactions    Diphenhydramine Hives    Lisinopril Cough       Prior to Admission medications    Medication Sig Start Date End Date Taking? Authorizing Provider   amLODIPine (Norvasc) 10 mg tablet Take 1 tablet (10 mg) by mouth once daily. 11/6/13   Historical Provider, MD   aspirin 81 mg EC tablet Take 1 tablet (81 mg) by mouth. 1/30/19   Historical Provider, MD   calcium carbonate 600 mg calcium (1,500 mg) tablet Take 1 tablet (600 mg) by mouth once daily. 9/14/17   Historical Provider, MD   chlorhexidine (Peridex) 0.12 % solution Swish and spit 15 ml for 2 doses, 15mL the night before surgery and 15 ml morning of surgery - swish for 30 seconds -DO NOT SWALLOW, SPIT OUT 5/21/24   REYNOLD Flaherty-CNP   doxycycline (Adoxa) 100 mg tablet Take 1 tablet (100 mg) by mouth once daily. 4/17/24   Historical Provider, MD   gabapentin (Neurontin) 300 mg capsule Take 1 capsule (300 mg) by mouth 3 times a day. 3/18/24   Jose Corea DO   isosorbide mononitrate ER (Imdur) 60 mg 24 hr tablet Take 1 tablet (60 mg) by mouth once daily. 12/7/15   Historical Provider, MD    levothyroxine (Synthroid) 50 mcg tablet Take 1 tablet (50 mcg) by mouth once daily. 5/7/24   Jose Corea DO   losartan (Cozaar) 50 mg tablet Take 1 tablet (50 mg) by mouth once daily. 4/5/20   Historical Provider, MD   metoprolol tartrate (Lopressor) 25 mg tablet Take 1 tablet (25 mg) by mouth 2 times a day. 3/29/24   Historical Provider, MD   multivitamin tablet Take 1 tablet by mouth once daily.    Historical Provider, MD   nitroglycerin (Nitrostat) 0.4 mg SL tablet Place 1 tablet (0.4 mg) under the tongue. 6/20/19   Historical Provider, MD   omega 3-dha-epa-fish oil 1,200 (144-216) mg capsule Take 1 capsule (1,200 mg) by mouth once daily. 1/30/19   Historical Provider, MD   omeprazole (PriLOSEC) 40 mg DR capsule TAKE 1 CAPSULE DAILY 12/22/23   Jose Corea DO   rosuvastatin (Crestor) 20 mg tablet Take 1 tablet (20 mg) by mouth once daily. 6/20/19   Historical Provider, MD   metoprolol tartrate (Lopressor) 50 mg tablet Take 0.5 tablets by mouth 2 times a day. 3/10/15 5/16/24  Historical Provider, MD MARY ALICE SALMERON   Constitutional: Negative for fever, chills, or sweats   ENMT: Negative for nasal discharge, congestion, ear pain, mouth pain, throat pain   Respiratory: Negative for cough, wheezing, shortness of breath   Cardiac: Negative for chest pain, dyspnea on exertion, palpitations   Gastrointestinal: Negative for nausea, vomiting, diarrhea, constipation, abdominal pain    Genitourinary: Negative for dysuria, flank pain, frequency, hematuria; Positive for urinary stress incontinence and hesitancy    Musculoskeletal: Negative for decreased ROM, pain, swelling, weakness   Neurological: Negative for dizziness, confusion, headache  Psychiatric: Negative for mood changes   Skin: Negative for itching, rash, ulcer    Hematologic/Lymph: Negative for bruising, easy bleeding  Allergic/Immunologic: Negative itching, sneezing, swelling      Physical Exam  HENT:      Head: Normocephalic.      Mouth/Throat:       Mouth: Mucous membranes are moist.   Eyes:      Extraocular Movements: Extraocular movements intact.   Cardiovascular:      Rate and Rhythm: Normal rate and regular rhythm.   Pulmonary:      Effort: Pulmonary effort is normal.      Breath sounds: Normal breath sounds.   Abdominal:      General: Abdomen is flat.      Palpations: Abdomen is soft.   Musculoskeletal:         General: Normal range of motion.      Cervical back: Normal range of motion.   Skin:     General: Skin is warm and dry.   Neurological:      General: No focal deficit present.      Mental Status: She is alert.   Psychiatric:         Mood and Affect: Mood normal.        PAT AIRWAY:   Airway:     Neck ROM::  Full   partials    Anesthesia:  Patient denies any anesthesia complications.     Visit Vitals  /76   Pulse 58   Temp 37 °C (98.6 °F)   Resp 16       DASI Risk Score    No data to display       Caprini DVT Assessment      Flowsheet Row Most Recent Value   DVT Score 5   Current Status Minor surgery planned   History Prior major surgery   Age Over 75 years          Modified Frailty Index    No data to display       CHADS2 Stroke Risk  Current as of about an hour ago        N/A 3 to 100%: High Risk   2 to < 3%: Medium Risk   0 to < 2%: Low Risk     Last Change: N/A          This score determines the patient's risk of having a stroke if the patient has atrial fibrillation.        This score is not applicable to this patient. Components are not calculated.          Revised Cardiac Risk Index    No data to display       Apfel Simplified Score    No data to display       Risk Analysis Index Results This Encounter    No data found in the last 1 encounters.       Stop Bang Score      Flowsheet Row Most Recent Value   Do you snore loudly? 0   Do you often feel tired or fatigued after your sleep? 0   Has anyone ever observed you stop breathing in your sleep? 0   Do you have or are you being treated for high blood pressure? 0   Recent BMI  (Calculated) 30.3   Is BMI greater than 35 kg/m2? 0=No   Age older than 50 years old? 1=Yes   Is your neck circumference greater than 17 inches (Male) or 16 inches (Female)? 0   Gender - Male 0=No   STOP-BANG Total Score 1            Assessment and Plan:     79 yo female scheduled for insertion of continence control stimulator on 5/28/2024 with permanent placement scheduled for 6/10/2024-both surgeries with Dr. Gordon.  Blood work and urine culture ordered along with MRSA-or chlorhexidine prescribed.  EKG shows normal sinus rhythm with ventricular rate 65 bpm-comparable EKGs on file. Otherwise no further orders indicated.     CAD s/p drug eluting coronary stent placement/ HTN  -follows with Dr. Mcpherson, last seen May 2023- stable conditions  -compliant with medication management  -has upcoming appointment in June 2023 for annual f/u    Breast cancer  -diagnosed with left sided breast cancer in 2016 s/p lumpectomy, 2.2 cm  -follows with Dr. Ortega, last seen in Feb 2024    Hypothyroidism  - medication management on levothyroxine     See risk scores as previously documented.

## 2024-05-22 ENCOUNTER — LAB (OUTPATIENT)
Dept: LAB | Facility: LAB | Age: 79
End: 2024-05-22
Payer: MEDICARE

## 2024-05-22 DIAGNOSIS — R33.9 URINARY RETENTION: ICD-10-CM

## 2024-05-22 PROCEDURE — 87086 URINE CULTURE/COLONY COUNT: CPT

## 2024-05-23 ENCOUNTER — OFFICE VISIT (OUTPATIENT)
Dept: PRIMARY CARE | Facility: CLINIC | Age: 79
End: 2024-05-23
Payer: MEDICARE

## 2024-05-23 VITALS
DIASTOLIC BLOOD PRESSURE: 52 MMHG | BODY MASS INDEX: 30.1 KG/M2 | TEMPERATURE: 97.6 F | WEIGHT: 144 LBS | SYSTOLIC BLOOD PRESSURE: 130 MMHG

## 2024-05-23 DIAGNOSIS — R39.9 UTI SYMPTOMS: Primary | ICD-10-CM

## 2024-05-23 LAB
BACTERIA UR CULT: NORMAL
POC BILIRUBIN, URINE: NEGATIVE
POC BLOOD, URINE: ABNORMAL
POC GLUCOSE, URINE: NEGATIVE MG/DL
POC KETONES, URINE: NEGATIVE MG/DL
POC LEUKOCYTES, URINE: ABNORMAL
POC NITRITE,URINE: NEGATIVE
POC PH, URINE: 7 PH
POC PROTEIN, URINE: NEGATIVE MG/DL
POC SPECIFIC GRAVITY, URINE: 1.01
POC UROBILINOGEN, URINE: 0.2 EU/DL
STAPHYLOCOCCUS SPEC CULT: ABNORMAL

## 2024-05-23 PROCEDURE — 1157F ADVNC CARE PLAN IN RCRD: CPT | Performed by: FAMILY MEDICINE

## 2024-05-23 PROCEDURE — 1160F RVW MEDS BY RX/DR IN RCRD: CPT | Performed by: FAMILY MEDICINE

## 2024-05-23 PROCEDURE — 99213 OFFICE O/P EST LOW 20 MIN: CPT | Performed by: FAMILY MEDICINE

## 2024-05-23 PROCEDURE — 3075F SYST BP GE 130 - 139MM HG: CPT | Performed by: FAMILY MEDICINE

## 2024-05-23 PROCEDURE — 81003 URINALYSIS AUTO W/O SCOPE: CPT | Performed by: FAMILY MEDICINE

## 2024-05-23 PROCEDURE — 1159F MED LIST DOCD IN RCRD: CPT | Performed by: FAMILY MEDICINE

## 2024-05-23 PROCEDURE — 3078F DIAST BP <80 MM HG: CPT | Performed by: FAMILY MEDICINE

## 2024-05-23 PROCEDURE — 1036F TOBACCO NON-USER: CPT | Performed by: FAMILY MEDICINE

## 2024-05-23 PROCEDURE — 1123F ACP DISCUSS/DSCN MKR DOCD: CPT | Performed by: FAMILY MEDICINE

## 2024-05-23 RX ORDER — NITROFURANTOIN 25; 75 MG/1; MG/1
100 CAPSULE ORAL 2 TIMES DAILY
Qty: 14 CAPSULE | Refills: 0 | Status: SHIPPED | OUTPATIENT
Start: 2024-05-23 | End: 2024-05-30

## 2024-05-23 NOTE — PROGRESS NOTES
"Subjective   Patient ID: [id number]     Ana Vega is a 78 y.o. female who presents for UTI.  HPI  She complains of a UTI.    UA today showed trace RBC and trace WBC.    She has seen Dr Gordon twice in the last month.  She had a urine culture submitted yesterday.  It is not back yet. She had labs yesterday.    She started to get UTI symptoms last night of pressure and burning.      She plans a procedure 5/28/24.  She states this is like a \"pacemaker for my bladder\".  It is an electric stimulator unit for the muscles of the bladder.  (Insertrion of continence control stimulator)    No fever.  No worsened back pain.  States she always has back pain.    Objective     /52 (BP Location: Right arm, Patient Position: Sitting)   Temp 36.4 °C (97.6 °F) (Temporal)   Wt 65.3 kg (144 lb)   BMI 30.10 kg/m²      Physical Exam  Constitutional:       Appearance: Normal appearance.   Cardiovascular:      Rate and Rhythm: Normal rate and regular rhythm.      Heart sounds: Normal heart sounds. No murmur heard.  Pulmonary:      Effort: Pulmonary effort is normal. No respiratory distress.      Breath sounds: Normal breath sounds.   Neurological:      General: No focal deficit present.      Mental Status: She is alert and oriented to person, place, and time.         Assessment/Plan   Problem List Items Addressed This Visit       UTI symptoms - Primary    Relevant Orders    POCT UA Automated manually resulted (Completed)     I ordered a repeat urine culture because your UTI symptoms started just last night.  I ordered an antibiotic.  Drink plenty of water.  Follow up with urology as directed.    Jose Corea,    "

## 2024-05-23 NOTE — PATIENT INSTRUCTIONS
I ordered a repeat urine culture because your UTI symptoms started just last night.  I ordered an antibiotic.  Drink plenty of water.  Follow up with urology as directed.

## 2024-05-24 ENCOUNTER — OFFICE VISIT (OUTPATIENT)
Dept: PRIMARY CARE | Facility: CLINIC | Age: 79
End: 2024-05-24
Payer: MEDICARE

## 2024-05-24 VITALS
SYSTOLIC BLOOD PRESSURE: 144 MMHG | TEMPERATURE: 97.6 F | DIASTOLIC BLOOD PRESSURE: 60 MMHG | BODY MASS INDEX: 31.07 KG/M2 | WEIGHT: 144 LBS | HEIGHT: 57 IN

## 2024-05-24 DIAGNOSIS — M79.602 PARESTHESIA AND PAIN OF BOTH UPPER EXTREMITIES: ICD-10-CM

## 2024-05-24 DIAGNOSIS — R20.2 PARESTHESIA AND PAIN OF BOTH UPPER EXTREMITIES: ICD-10-CM

## 2024-05-24 DIAGNOSIS — I25.118 CORONARY ARTERY DISEASE OF NATIVE ARTERY OF NATIVE HEART WITH STABLE ANGINA PECTORIS (CMS-HCC): ICD-10-CM

## 2024-05-24 DIAGNOSIS — M51.36 DDD (DEGENERATIVE DISC DISEASE), LUMBAR: ICD-10-CM

## 2024-05-24 DIAGNOSIS — Z00.00 ROUTINE GENERAL MEDICAL EXAMINATION AT HEALTH CARE FACILITY: Primary | ICD-10-CM

## 2024-05-24 DIAGNOSIS — Z12.11 ENCOUNTER FOR SCREENING FOR MALIGNANT NEOPLASM OF COLON: ICD-10-CM

## 2024-05-24 DIAGNOSIS — I70.223 ATHEROSCLEROSIS OF NATIVE ARTERIES OF EXTREMITIES WITH REST PAIN, BILATERAL LEGS (MULTI): ICD-10-CM

## 2024-05-24 DIAGNOSIS — M79.601 PARESTHESIA AND PAIN OF BOTH UPPER EXTREMITIES: ICD-10-CM

## 2024-05-24 PROCEDURE — 1170F FXNL STATUS ASSESSED: CPT | Performed by: FAMILY MEDICINE

## 2024-05-24 PROCEDURE — G0439 PPPS, SUBSEQ VISIT: HCPCS | Performed by: FAMILY MEDICINE

## 2024-05-24 PROCEDURE — 1157F ADVNC CARE PLAN IN RCRD: CPT | Performed by: FAMILY MEDICINE

## 2024-05-24 PROCEDURE — 99397 PER PM REEVAL EST PAT 65+ YR: CPT | Performed by: FAMILY MEDICINE

## 2024-05-24 PROCEDURE — 1160F RVW MEDS BY RX/DR IN RCRD: CPT | Performed by: FAMILY MEDICINE

## 2024-05-24 PROCEDURE — 1123F ACP DISCUSS/DSCN MKR DOCD: CPT | Performed by: FAMILY MEDICINE

## 2024-05-24 PROCEDURE — 1036F TOBACCO NON-USER: CPT | Performed by: FAMILY MEDICINE

## 2024-05-24 PROCEDURE — 1159F MED LIST DOCD IN RCRD: CPT | Performed by: FAMILY MEDICINE

## 2024-05-24 PROCEDURE — 3077F SYST BP >= 140 MM HG: CPT | Performed by: FAMILY MEDICINE

## 2024-05-24 PROCEDURE — 3078F DIAST BP <80 MM HG: CPT | Performed by: FAMILY MEDICINE

## 2024-05-24 RX ORDER — GABAPENTIN 400 MG/1
400 CAPSULE ORAL 3 TIMES DAILY
Qty: 270 CAPSULE | Refills: 0 | Status: SHIPPED | OUTPATIENT
Start: 2024-05-24

## 2024-05-24 RX ORDER — CELECOXIB 100 MG/1
100 CAPSULE ORAL DAILY
Qty: 30 CAPSULE | Refills: 0 | Status: SHIPPED | OUTPATIENT
Start: 2024-05-24 | End: 2024-06-10 | Stop reason: HOSPADM

## 2024-05-24 ASSESSMENT — ACTIVITIES OF DAILY LIVING (ADL)
DRESSING: INDEPENDENT
GROCERY_SHOPPING: INDEPENDENT
MANAGING_FINANCES: INDEPENDENT
BATHING: INDEPENDENT
DOING_HOUSEWORK: INDEPENDENT

## 2024-05-24 ASSESSMENT — ENCOUNTER SYMPTOMS
SHORTNESS OF BREATH: 0
VOMITING: 0
LIGHT-HEADEDNESS: 0
HEADACHES: 0
FEVER: 0
RHINORRHEA: 0
WEAKNESS: 0
ADENOPATHY: 0
DYSPHORIC MOOD: 0
WOUND: 0
SORE THROAT: 0
MYALGIAS: 0
NAUSEA: 0
OCCASIONAL FEELINGS OF UNSTEADINESS: 0
FATIGUE: 0
WHEEZING: 0
BACK PAIN: 1
FREQUENCY: 1
LOSS OF SENSATION IN FEET: 0
COUGH: 0
DYSURIA: 1
DEPRESSION: 0
BLOOD IN STOOL: 0
HEMATURIA: 0
DIARRHEA: 0
SINUS PRESSURE: 0
CONSTIPATION: 0
ABDOMINAL PAIN: 0
SLEEP DISTURBANCE: 0
NERVOUS/ANXIOUS: 0
NUMBNESS: 1
DIZZINESS: 0
ARTHRALGIAS: 1
PALPITATIONS: 0
VOICE CHANGE: 0

## 2024-05-24 ASSESSMENT — PATIENT HEALTH QUESTIONNAIRE - PHQ9
1. LITTLE INTEREST OR PLEASURE IN DOING THINGS: NOT AT ALL
SUM OF ALL RESPONSES TO PHQ9 QUESTIONS 1 AND 2: 0
2. FEELING DOWN, DEPRESSED OR HOPELESS: NOT AT ALL

## 2024-05-24 NOTE — PATIENT INSTRUCTIONS
Follow up with cardiology next month as scheduled.  Try to avoid sugars and starches in the diet.  Continue to exercise four times per week.  Return in three months for a recheck.

## 2024-05-24 NOTE — PROGRESS NOTES
Subjective   Reason for Visit: Ana Vega is an 78 y.o. female here for a Medicare Wellness visit.          Reviewed all medications by prescribing practitioner or clinical pharmacist (such as prescriptions, OTCs, herbal therapies and supplements) and documented in the medical record.  Current Outpatient Medications on File Prior to Visit   Medication Sig Dispense Refill    amLODIPine (Norvasc) 10 mg tablet Take 1 tablet (10 mg) by mouth once daily.      aspirin 81 mg EC tablet Take 1 tablet (81 mg) by mouth.      calcium carbonate 600 mg calcium (1,500 mg) tablet Take 1 tablet (600 mg) by mouth once daily.      chlorhexidine (Peridex) 0.12 % solution Swish and spit 15 ml for 2 doses, 15mL the night before surgery and 15 ml morning of surgery - swish for 30 seconds -DO NOT SWALLOW, SPIT  mL 0    gabapentin (Neurontin) 300 mg capsule Take 1 capsule (300 mg) by mouth 3 times a day. 270 capsule 1    isosorbide mononitrate ER (Imdur) 60 mg 24 hr tablet Take 1 tablet (60 mg) by mouth once daily.      levothyroxine (Synthroid) 50 mcg tablet Take 1 tablet (50 mcg) by mouth once daily. 90 tablet 1    losartan (Cozaar) 50 mg tablet Take 1 tablet (50 mg) by mouth once daily.      metoprolol tartrate (Lopressor) 25 mg tablet Take 1 tablet (25 mg) by mouth 2 times a day.      multivitamin tablet Take 1 tablet by mouth once daily.      naproxen (Naprosyn) 250 mg tablet Take 1 tablet (250 mg) by mouth if needed for mild pain (1 - 3).      nitrofurantoin, macrocrystal-monohydrate, (Macrobid) 100 mg capsule Take 1 capsule (100 mg) by mouth 2 times a day for 7 days. 14 capsule 0    nitroglycerin (Nitrostat) 0.4 mg SL tablet Place 1 tablet (0.4 mg) under the tongue.      omega 3-dha-epa-fish oil 1,200 (144-216) mg capsule Take 1 capsule (1,200 mg) by mouth once daily.      omeprazole (PriLOSEC) 40 mg DR capsule TAKE 1 CAPSULE DAILY 90 capsule 1    rosuvastatin (Crestor) 20 mg tablet Take 1 tablet (20 mg) by mouth once  daily.      [DISCONTINUED] doxycycline (Adoxa) 100 mg tablet Take 1 tablet (100 mg) by mouth once daily.       No current facility-administered medications on file prior to visit.     No hospitalizations or surgeries in the last year.  She plans a surgery for a bladder pacemaker on Tuesday.      Does not have advanced directives.  Full code.  POA would be .  Then daughter.    HPI  Tobacco Use: Medium Risk (5/24/2024)    Patient History     Smoking Tobacco Use: Former     Smokeless Tobacco Use: Never     Passive Exposure: Not on file   She quit smoking 50 years ago.  Very rare alcohol.  No drugs.    She is still seeing Dr Ortega for breast cancer.  No treatment for that since 2016.      Patient Care Team:  Jose Corea DO as PCP - General  Jose Corea DO as PCP - [employer] PCP  Jose Luis Ortega MD as Consulting Physician (Hematology and Oncology)     Review of Systems   Constitutional:  Negative for fatigue and fever.   HENT:  Negative for rhinorrhea, sinus pressure, sore throat and voice change.    Respiratory:  Negative for cough, shortness of breath and wheezing.    Cardiovascular:  Negative for chest pain, palpitations and leg swelling.   Gastrointestinal:  Negative for abdominal pain, blood in stool, constipation, diarrhea, nausea and vomiting.   Genitourinary:  Positive for dysuria and frequency. Negative for hematuria and vaginal bleeding.   Musculoskeletal:  Positive for arthralgias and back pain. Negative for myalgias.   Skin:  Negative for rash and wound.        No moles growing or changing.    She has seen dermatology regularly and some precancerous lesions were removed.   Neurological:  Positive for numbness. Negative for dizziness, syncope, weakness, light-headedness and headaches.   Hematological:  Negative for adenopathy.   Psychiatric/Behavioral:  Negative for dysphoric mood, self-injury, sleep disturbance and suicidal ideas. The patient is not nervous/anxious.   "      Objective   Vitals:  /60 (BP Location: Right arm, Patient Position: Sitting)   Temp 36.4 °C (97.6 °F) (Skin)   Ht 1.448 m (4' 9\")   Wt 65.3 kg (144 lb)   BMI 31.16 kg/m²       Physical Exam  Vitals reviewed.   Constitutional:       General: She is not in acute distress.     Appearance: Normal appearance. She is not ill-appearing or toxic-appearing.   HENT:      Head: Normocephalic and atraumatic.      Right Ear: Tympanic membrane, ear canal and external ear normal.      Left Ear: Tympanic membrane, ear canal and external ear normal.      Nose: Nose normal.      Mouth/Throat:      Mouth: Mucous membranes are moist.   Eyes:      Extraocular Movements: Extraocular movements intact.      Conjunctiva/sclera: Conjunctivae normal.      Pupils: Pupils are equal, round, and reactive to light.   Cardiovascular:      Rate and Rhythm: Normal rate and regular rhythm.      Heart sounds: No murmur heard.  Pulmonary:      Effort: Pulmonary effort is normal.      Breath sounds: Normal breath sounds.   Abdominal:      General: Bowel sounds are normal. There is no distension.      Palpations: Abdomen is soft. There is no mass.      Tenderness: There is no abdominal tenderness. There is no guarding or rebound.   Musculoskeletal:         General: No tenderness.      Cervical back: Neck supple.      Right lower leg: No edema.      Left lower leg: No edema.   Skin:     Coloration: Skin is not jaundiced or pale.      Findings: No rash.   Neurological:      General: No focal deficit present.      Mental Status: She is alert and oriented to person, place, and time. Mental status is at baseline.   Psychiatric:         Mood and Affect: Mood normal.         Behavior: Behavior normal.         Thought Content: Thought content normal.         Judgment: Judgment normal.         Assessment/Plan   Problem List Items Addressed This Visit    None    Annual Wellness exam completed   Preventive Health history reviewed:  Labs reviewed from " recent months.    Mammogram done March 2024.  BMD done 11/2022.  Cscope one 2019.  It showed two small polyps.  She is ok with another check at this point in her life.    Low dose CT chest for lung cancer screening not indicated.  She quit smoking fifty years ago.  Depression Screening done  Advanced Directives Discussion Completed  Cardiovascular risk discussed and if needed, lifestyle modifications recommended, including nutritional choices, exercise, and elimination of habits contributing to risk.  We agreed on a plan to reduce the current cardiovascular risk.  See ecalc ASCVD Risk  Plus for data discussed regarding risk and risk reduction opportunities.  Aspirin use is recommended after reviewing the updated guidelines.   Vaccines:  Influenza done 10/20/2023  Prevnar 20 she states that she had it.    Prevnar 13 done 2015  Pneumovax 23 done 2018  Shingrix completed 2019.  RSV vaccine recommended at the pharmacy.  Follow up with cardiology next month as scheduled.  Try to avoid sugars and starches in the diet.  Continue to exercise four times per week.  Return in three months for a recheck.

## 2024-05-25 DIAGNOSIS — K21.9 GASTROESOPHAGEAL REFLUX DISEASE, UNSPECIFIED WHETHER ESOPHAGITIS PRESENT: ICD-10-CM

## 2024-05-27 LAB
ATRIAL RATE: 65 BPM
P AXIS: 72 DEGREES
P OFFSET: 183 MS
P ONSET: 132 MS
PR INTERVAL: 160 MS
Q ONSET: 212 MS
QRS COUNT: 11 BEATS
QRS DURATION: 86 MS
QT INTERVAL: 402 MS
QTC CALCULATION(BAZETT): 418 MS
QTC FREDERICIA: 412 MS
R AXIS: 32 DEGREES
T AXIS: 50 DEGREES
T OFFSET: 413 MS
VENTRICULAR RATE: 65 BPM

## 2024-05-28 RX ORDER — OMEPRAZOLE 40 MG/1
CAPSULE, DELAYED RELEASE ORAL
Qty: 90 CAPSULE | Refills: 0 | Status: SHIPPED | OUTPATIENT
Start: 2024-05-28 | End: 2024-06-03 | Stop reason: SDUPTHER

## 2024-05-31 ENCOUNTER — TELEPHONE (OUTPATIENT)
Dept: PRIMARY CARE | Facility: CLINIC | Age: 79
End: 2024-05-31
Payer: MEDICARE

## 2024-05-31 DIAGNOSIS — R39.9 UTI SYMPTOMS: Primary | ICD-10-CM

## 2024-06-02 ENCOUNTER — PATIENT MESSAGE (OUTPATIENT)
Dept: PRIMARY CARE | Facility: CLINIC | Age: 79
End: 2024-06-02
Payer: MEDICARE

## 2024-06-02 DIAGNOSIS — K21.9 GASTROESOPHAGEAL REFLUX DISEASE, UNSPECIFIED WHETHER ESOPHAGITIS PRESENT: ICD-10-CM

## 2024-06-03 ENCOUNTER — LAB (OUTPATIENT)
Dept: LAB | Facility: LAB | Age: 79
End: 2024-06-03
Payer: MEDICARE

## 2024-06-03 DIAGNOSIS — R39.9 UTI SYMPTOMS: ICD-10-CM

## 2024-06-03 PROCEDURE — 87086 URINE CULTURE/COLONY COUNT: CPT

## 2024-06-03 RX ORDER — OMEPRAZOLE 40 MG/1
40 CAPSULE, DELAYED RELEASE ORAL DAILY
Qty: 90 CAPSULE | Refills: 2 | Status: SHIPPED | OUTPATIENT
Start: 2024-06-03

## 2024-06-04 ENCOUNTER — PREP FOR PROCEDURE (OUTPATIENT)
Dept: UROLOGY | Facility: HOSPITAL | Age: 79
End: 2024-06-04
Payer: MEDICARE

## 2024-06-04 DIAGNOSIS — R33.9 URINARY RETENTION: Primary | ICD-10-CM

## 2024-06-04 LAB — BACTERIA UR CULT: NORMAL

## 2024-06-04 RX ORDER — SODIUM CHLORIDE 9 MG/ML
100 INJECTION, SOLUTION INTRAVENOUS CONTINUOUS
OUTPATIENT
Start: 2024-06-04

## 2024-06-07 ENCOUNTER — APPOINTMENT (OUTPATIENT)
Dept: UROLOGY | Facility: CLINIC | Age: 79
End: 2024-06-07
Payer: MEDICARE

## 2024-06-10 ENCOUNTER — ANESTHESIA (OUTPATIENT)
Dept: OPERATING ROOM | Facility: HOSPITAL | Age: 79
End: 2024-06-10
Payer: MEDICARE

## 2024-06-10 ENCOUNTER — APPOINTMENT (OUTPATIENT)
Dept: RADIOLOGY | Facility: HOSPITAL | Age: 79
End: 2024-06-10
Payer: MEDICARE

## 2024-06-10 ENCOUNTER — HOSPITAL ENCOUNTER (OUTPATIENT)
Facility: HOSPITAL | Age: 79
Setting detail: OUTPATIENT SURGERY
Discharge: HOME | End: 2024-06-10
Attending: UROLOGY | Admitting: UROLOGY
Payer: MEDICARE

## 2024-06-10 ENCOUNTER — ANESTHESIA EVENT (OUTPATIENT)
Dept: OPERATING ROOM | Facility: HOSPITAL | Age: 79
End: 2024-06-10
Payer: MEDICARE

## 2024-06-10 VITALS
SYSTOLIC BLOOD PRESSURE: 152 MMHG | TEMPERATURE: 97.7 F | BODY MASS INDEX: 29.99 KG/M2 | OXYGEN SATURATION: 96 % | HEART RATE: 78 BPM | RESPIRATION RATE: 18 BRPM | HEIGHT: 57 IN | DIASTOLIC BLOOD PRESSURE: 80 MMHG | WEIGHT: 139 LBS

## 2024-06-10 DIAGNOSIS — G89.18 ACUTE POST-OPERATIVE PAIN: ICD-10-CM

## 2024-06-10 DIAGNOSIS — M51.36 DDD (DEGENERATIVE DISC DISEASE), LUMBAR: ICD-10-CM

## 2024-06-10 DIAGNOSIS — R33.9 URINARY RETENTION: Primary | ICD-10-CM

## 2024-06-10 PROBLEM — Z95.5 STENTED CORONARY ARTERY: Status: ACTIVE | Noted: 2024-06-10

## 2024-06-10 PROCEDURE — 64561 IMPLANT NEUROELECTRODES: CPT | Performed by: UROLOGY

## 2024-06-10 PROCEDURE — 3700000001 HC GENERAL ANESTHESIA TIME - INITIAL BASE CHARGE: Performed by: UROLOGY

## 2024-06-10 PROCEDURE — 2720000007 HC OR 272 NO HCPCS: Performed by: UROLOGY

## 2024-06-10 PROCEDURE — C1889 IMPLANT/INSERT DEVICE, NOC: HCPCS | Performed by: UROLOGY

## 2024-06-10 PROCEDURE — 7100000009 HC PHASE TWO TIME - INITIAL BASE CHARGE: Performed by: UROLOGY

## 2024-06-10 PROCEDURE — 7100000001 HC RECOVERY ROOM TIME - INITIAL BASE CHARGE: Performed by: UROLOGY

## 2024-06-10 PROCEDURE — 7100000002 HC RECOVERY ROOM TIME - EACH INCREMENTAL 1 MINUTE: Performed by: UROLOGY

## 2024-06-10 PROCEDURE — 3600000004 HC OR TIME - INITIAL BASE CHARGE - PROCEDURE LEVEL FOUR: Performed by: UROLOGY

## 2024-06-10 PROCEDURE — 2500000005 HC RX 250 GENERAL PHARMACY W/O HCPCS: Performed by: UROLOGY

## 2024-06-10 PROCEDURE — 2780000003 HC OR 278 NO HCPCS: Performed by: UROLOGY

## 2024-06-10 PROCEDURE — 2500000004 HC RX 250 GENERAL PHARMACY W/ HCPCS (ALT 636 FOR OP/ED): Performed by: NURSE ANESTHETIST, CERTIFIED REGISTERED

## 2024-06-10 PROCEDURE — C1883 ADAPT/EXT, PACING/NEURO LEAD: HCPCS | Performed by: UROLOGY

## 2024-06-10 PROCEDURE — L8679 IMP NEUROSTI PLS GN ANY TYPE: HCPCS | Performed by: UROLOGY

## 2024-06-10 PROCEDURE — 3600000009 HC OR TIME - EACH INCREMENTAL 1 MINUTE - PROCEDURE LEVEL FOUR: Performed by: UROLOGY

## 2024-06-10 PROCEDURE — C1778 LEAD, NEUROSTIMULATOR: HCPCS | Performed by: UROLOGY

## 2024-06-10 PROCEDURE — 7100000010 HC PHASE TWO TIME - EACH INCREMENTAL 1 MINUTE: Performed by: UROLOGY

## 2024-06-10 PROCEDURE — 3700000002 HC GENERAL ANESTHESIA TIME - EACH INCREMENTAL 1 MINUTE: Performed by: UROLOGY

## 2024-06-10 DEVICE — LEAD IMPLANT KIT
Type: IMPLANTABLE DEVICE | Site: BACK | Status: NON-FUNCTIONAL
Brand: AXONICS

## 2024-06-10 DEVICE — KIT, TINED LEAD: Type: IMPLANTABLE DEVICE | Site: BACK | Status: FUNCTIONAL

## 2024-06-10 RX ORDER — SODIUM CHLORIDE, SODIUM LACTATE, POTASSIUM CHLORIDE, CALCIUM CHLORIDE 600; 310; 30; 20 MG/100ML; MG/100ML; MG/100ML; MG/100ML
50 INJECTION, SOLUTION INTRAVENOUS CONTINUOUS
Status: DISCONTINUED | OUTPATIENT
Start: 2024-06-10 | End: 2024-06-10 | Stop reason: HOSPADM

## 2024-06-10 RX ORDER — LIDOCAINE HYDROCHLORIDE 10 MG/ML
0.1 INJECTION INFILTRATION; PERINEURAL ONCE
Status: DISCONTINUED | OUTPATIENT
Start: 2024-06-10 | End: 2024-06-10 | Stop reason: HOSPADM

## 2024-06-10 RX ORDER — PROPOFOL 10 MG/ML
INJECTION, EMULSION INTRAVENOUS AS NEEDED
Status: DISCONTINUED | OUTPATIENT
Start: 2024-06-10 | End: 2024-06-10

## 2024-06-10 RX ORDER — NAPROXEN 250 MG/1
250 TABLET ORAL AS NEEDED
Qty: 30 TABLET | Refills: 0 | Status: SHIPPED | OUTPATIENT
Start: 2024-06-10 | End: 2024-06-10

## 2024-06-10 RX ORDER — SODIUM CHLORIDE, SODIUM LACTATE, POTASSIUM CHLORIDE, CALCIUM CHLORIDE 600; 310; 30; 20 MG/100ML; MG/100ML; MG/100ML; MG/100ML
100 INJECTION, SOLUTION INTRAVENOUS CONTINUOUS
Status: DISCONTINUED | OUTPATIENT
Start: 2024-06-10 | End: 2024-06-10 | Stop reason: HOSPADM

## 2024-06-10 RX ORDER — ONDANSETRON HYDROCHLORIDE 2 MG/ML
INJECTION, SOLUTION INTRAVENOUS AS NEEDED
Status: DISCONTINUED | OUTPATIENT
Start: 2024-06-10 | End: 2024-06-10

## 2024-06-10 RX ORDER — ONDANSETRON HYDROCHLORIDE 2 MG/ML
4 INJECTION, SOLUTION INTRAVENOUS ONCE AS NEEDED
Status: DISCONTINUED | OUTPATIENT
Start: 2024-06-10 | End: 2024-06-10 | Stop reason: HOSPADM

## 2024-06-10 RX ORDER — OXYCODONE HYDROCHLORIDE 5 MG/1
5 TABLET ORAL EVERY 6 HOURS PRN
Qty: 5 TABLET | Refills: 0 | Status: SHIPPED | OUTPATIENT
Start: 2024-06-10

## 2024-06-10 RX ORDER — ACETAMINOPHEN 500 MG
1000 TABLET ORAL EVERY 6 HOURS PRN
Qty: 30 TABLET | Refills: 0 | Status: SHIPPED | OUTPATIENT
Start: 2024-06-10

## 2024-06-10 RX ORDER — FENTANYL CITRATE 50 UG/ML
25 INJECTION, SOLUTION INTRAMUSCULAR; INTRAVENOUS EVERY 5 MIN PRN
Status: DISCONTINUED | OUTPATIENT
Start: 2024-06-10 | End: 2024-06-10 | Stop reason: HOSPADM

## 2024-06-10 RX ORDER — FENTANYL CITRATE 50 UG/ML
12.5 INJECTION, SOLUTION INTRAMUSCULAR; INTRAVENOUS EVERY 5 MIN PRN
Status: DISCONTINUED | OUTPATIENT
Start: 2024-06-10 | End: 2024-06-10 | Stop reason: HOSPADM

## 2024-06-10 RX ORDER — NAPROXEN 500 MG/1
500 TABLET ORAL 2 TIMES DAILY PRN
Qty: 30 TABLET | Refills: 0 | Status: SHIPPED | OUTPATIENT
Start: 2024-06-10 | End: 2024-07-10

## 2024-06-10 RX ORDER — CEFAZOLIN SODIUM 2 G/100ML
INJECTION, SOLUTION INTRAVENOUS AS NEEDED
Status: DISCONTINUED | OUTPATIENT
Start: 2024-06-10 | End: 2024-06-10

## 2024-06-10 RX ORDER — ACETAMINOPHEN 325 MG/1
650 TABLET ORAL EVERY 4 HOURS PRN
Status: DISCONTINUED | OUTPATIENT
Start: 2024-06-10 | End: 2024-06-10 | Stop reason: HOSPADM

## 2024-06-10 RX ORDER — CEPHALEXIN 500 MG/1
500 CAPSULE ORAL 2 TIMES DAILY
Qty: 28 CAPSULE | Refills: 0 | Status: SHIPPED | OUTPATIENT
Start: 2024-06-10 | End: 2024-06-24

## 2024-06-10 RX ORDER — FENTANYL CITRATE 50 UG/ML
INJECTION, SOLUTION INTRAMUSCULAR; INTRAVENOUS AS NEEDED
Status: DISCONTINUED | OUTPATIENT
Start: 2024-06-10 | End: 2024-06-10

## 2024-06-10 RX ORDER — DROPERIDOL 2.5 MG/ML
0.62 INJECTION, SOLUTION INTRAMUSCULAR; INTRAVENOUS ONCE AS NEEDED
Status: DISCONTINUED | OUTPATIENT
Start: 2024-06-10 | End: 2024-06-10 | Stop reason: HOSPADM

## 2024-06-10 RX ORDER — LIDOCAINE HYDROCHLORIDE 20 MG/ML
INJECTION, SOLUTION INFILTRATION; PERINEURAL AS NEEDED
Status: DISCONTINUED | OUTPATIENT
Start: 2024-06-10 | End: 2024-06-10 | Stop reason: HOSPADM

## 2024-06-10 RX ADMIN — SODIUM CHLORIDE, SODIUM LACTATE, POTASSIUM CHLORIDE, AND CALCIUM CHLORIDE: 600; 310; 30; 20 INJECTION, SOLUTION INTRAVENOUS at 12:37

## 2024-06-10 RX ADMIN — SODIUM CHLORIDE, SODIUM LACTATE, POTASSIUM CHLORIDE, AND CALCIUM CHLORIDE: 600; 310; 30; 20 INJECTION, SOLUTION INTRAVENOUS at 11:22

## 2024-06-10 RX ADMIN — PROPOFOL 40 MG: 10 INJECTION, EMULSION INTRAVENOUS at 11:33

## 2024-06-10 RX ADMIN — ONDANSETRON 4 MG: 2 INJECTION, SOLUTION INTRAMUSCULAR; INTRAVENOUS at 11:44

## 2024-06-10 RX ADMIN — PROPOFOL 20 MG: 10 INJECTION, EMULSION INTRAVENOUS at 11:35

## 2024-06-10 RX ADMIN — FENTANYL CITRATE 25 MCG: 50 INJECTION, SOLUTION INTRAMUSCULAR; INTRAVENOUS at 11:35

## 2024-06-10 RX ADMIN — PROPOFOL 150 MCG/KG/MIN: 10 INJECTION, EMULSION INTRAVENOUS at 11:36

## 2024-06-10 RX ADMIN — DEXAMETHASONE SODIUM PHOSPHATE 4 MG: 4 INJECTION INTRA-ARTICULAR; INTRALESIONAL; INTRAMUSCULAR; INTRAVENOUS; SOFT TISSUE at 11:45

## 2024-06-10 RX ADMIN — CEFAZOLIN SODIUM 2 G: 2 INJECTION, SOLUTION INTRAVENOUS at 11:35

## 2024-06-10 SDOH — HEALTH STABILITY: MENTAL HEALTH: CURRENT SMOKER: 0

## 2024-06-10 ASSESSMENT — PAIN SCALES - GENERAL
PAINLEVEL_OUTOF10: 0 - NO PAIN
PAIN_LEVEL: 2
PAINLEVEL_OUTOF10: 0 - NO PAIN
PAINLEVEL_OUTOF10: 1

## 2024-06-10 ASSESSMENT — PAIN - FUNCTIONAL ASSESSMENT
PAIN_FUNCTIONAL_ASSESSMENT: 0-10
PAIN_FUNCTIONAL_ASSESSMENT: WONG-BAKER FACES
PAIN_FUNCTIONAL_ASSESSMENT: 0-10

## 2024-06-10 NOTE — OP NOTE
Insertion Continence Control Stimulator Operative Note     Date: 6/10/2024  OR Location: [facility] OR    Name: Ana Vega, : 1945, Age: 78 y.o., MRN: [id number], Sex: female    Diagnosis  Pre-op Diagnosis     * Urinary retention [R33.9] Post-op Diagnosis     * Urinary retention [R33.9]     Procedures  Insertion Continence Control Stimulator  61393 - TN PRQ IMPLTJ NEUROSTIM ELTRD SACRAL NRVE W/IMAGING      Surgeons      * Julio César Gordon - Primary    Resident/Fellow/Other Assistant:  Martha Chris MD - Fellow   Willis Cook MD - PGY2    Procedure Summary  Anesthesia: Monitor Anesthesia Care  ASA: III  Anesthesia Staff: Anesthesiologist: Juan Sal MD  C-AA: DEE Paul  Estimated Blood Loss: 15mL  Intra-op Medications:   Administrations occurring from 1145 to 1305 on 06/10/24:   Medication Name Total Dose   lidocaine (Xylocaine) 20 mg/mL (2 %) injection 20 mL              Anesthesia Record               Intraprocedure I/O Totals          Intake    LR bolus 700.00 mL    Propofol Drip 0.00 mL    The total shown is the total volume documented since Anesthesia Start was filed.    Total Intake 700 mL          Specimen: No specimens collected     Staff:   Circulator: Zoila Forrester Person: Sofi Hutson Circulator: Jimmy      Implants:  Implants       Type Name Action Serial No.      Neuro Interventional Implant KIT, LEAD IMPLANT - [id number] Used, Not Implanted       PERCUTANEOUS EXTENSION Implanted [id number]      TINED LEAD KIT Implanted               Findings: Fluoroscopic confirmation of placement of lead in S3 foramina on patient's right with sheila and toe reflexes on stimulation     Indications: Ana Vega is an 78 y.o. female who is having surgery for Urinary retention [R33.9].     The patient was seen in the preoperative area. The risks, benefits, complications, treatment options, non-operative alternatives, expected recovery and outcomes were discussed with the patient. The  possibilities of reaction to medication, pulmonary aspiration, injury to surrounding structures, bleeding, recurrent infection, the need for additional procedures, failure to diagnose a condition, and creating a complication requiring transfusion or operation were discussed with the patient. The patient concurred with the proposed plan, giving informed consent.  The site of surgery was properly noted/marked if necessary per policy. The patient has been actively warmed in preoperative area. Preoperative antibiotics have been ordered and given within 1 hours of incision. Venous thrombosis prophylaxis have been ordered including bilateral sequential compression devices    Procedure Details: The patient was taken to the operating room, positioned in prone position and placed under MAC anesthesia.  She was prepared and draped in normal sterile fashion.  Ancef was given for antibiotic prophylaxis.  We identified our landmarks and located the approximate area of the S3 foramen 11 cm from the sacrococcygeal angle.  We injected 1% lidocaine and placed bilateral needles into the S3 foramen.  We then tested for reflexes which were appropriate on the patient's right side with a good sheila reflex as well as toe flexion reflex. S3 placement was confirmed on fluoroscopy.  At this time, a stylet was placed through the needle and removed the needle. A small stab incision was made next to the stylet.  We placed the dilating sheath over the stylet and dilated the tract down to the foramen.  We removed the stylet and then placed the curved lead.  The lead was placed with 3 electrodes outside of the foramen and one in the foramen.  It was tested, and we had good response on all of the electrodes.  The dilating sheath was then retracted to allow the michelle of lead to be exposed, and we confirmed good placement.  Next, we injected 1% lidocaine with epinephrine laterally to the superior buttock area where the future pocket would be  created.  We made a small 2 cm incision and obtained hemostasis at the incision.  We then tunneled the lead to this incision.  We placed the boot over top of the lead after cleaning and drying it. The connection wire was tunneled across midline to the patient's left side. We attached the external battery device.  We then closed the incision on the patient's right buttock with 3-0 Monocryl subcutaneous layer and then a 4-0 Monocryl subcuticular layer.  This concluded the procedure.  All counts were correct. The patient returned to the recovery room in stable condition.    Martha Chris MD, Urogynecology Fellow    Dr. Gordon was present for all critical portions of the procedure.   Complications:  None; patient tolerated the procedure well.    Disposition: PACU - hemodynamically stable.  Condition: stable

## 2024-06-10 NOTE — DISCHARGE INSTRUCTIONS
DEPARTMENT OF UROLOGY  DISCHARGE INSTRUCTIONS Sacral Neuromodulation Stage 1  Outpatient Surgery    C O N F I D E N T I A L   I N F O R M A T I O N    Ana Vega    Call [phone] during regular daytime business hours (8:00 am - 5:00 pm). After 5:00 pm, ask for the Urology Resident with any questions or concerns.      If it is a life-threatening situation, proceed to the nearest emergency department.        Thank you for the opportunity to care for you today.  Your health and healing are very important to us.  We hope we made you feel as comfortable as possible and are committed to your recovery and continued well-being.      The following is a brief overview of your sacral neuromodulation stage 2 procedure today. Some of the information contained on this summary may be confidential.  This information should be kept in your records and should be shared with your regular doctor.    Physicians:   Dr. Julio César Gordon MD    Procedure performed: Interstim stage 1 or device revision/replacement     Since you have received medication for pain or sedation while under our care, you should not drive, operate machinery, drink alcohol, or sign any legal documents for 24 hours.? You should have someone with you tonight.     What to Expect During your Recovery and Home Care  Activity When You Go Home   Take it easy.? Avoid strenuous activity, lifting heavy objects, bending, twisting and stretching.? Avoid pressure on the lead for two weeks minimum. Being too active too soon can cause the lead to move out of position.   Continue to take all medications you would normally take for your urinary/voiding symptoms unless advised by you doctor.?    Take antibiotics as prescribed.?    You will receive your patient  and instructions for using it to activate the stimulator as well as how to increase and decrease the intensity of the stimulation as you desire.     When to call our office:   If you experience unusual  or severe pain not relieved by your pain medication, excessive bleeding, excessive swelling or redness, significant difficulty emptying your bladder, or fever over 100.6F you should call your doctor.?  If you need to be evaluated by a physician on an emergent basis, please go to the nearest ER and have the ER physician contact [facility] for assistance.?    If you cannot reach medical assistance, seek care at an emergency room and take this paperwork with you     Diet: no change     Wound care:   You can shower after 24 hours but keep the incision dry afterwards, but no baths until cleared after stage 2.  You may use ice to the incision.  The surgical glue should begin to peel off in 1 to 2 weeks.  You may activate the stimulator with your  whenever you desire.     Medication:    You may resume your daily prescription medication schedule     Permanent Precautions:   Diathermy (for muscle relaxation) should not be used if nerve stimulator is present.   Some theft detectors and screening devices may rarely cause the stimulator to turn on or off, or cause a harmless brief sensation as you approach the device.   You will have an ID card to present at the airport or other security area in the event the device activates the security unit.   Be certain to let your physicians know you have a pacemaker device and always shut the device off prior to undergoing any surgical procedure.     Frequently asked questions:   Can you sit on heated car seats??? Yes   Can you go to a tanning butler????? Yes, but the area where the battery is may burn more easily   Can a heating pad be used over the area????? Yes   Will microwaves cause any interference????? No and there is no interference for other electrical devices such as computer or cell phone   Will the device affect medical devices in a hospital????? No, but it can interfere with a medical device applied to you such as EKG   Can you use a hot tub or  sauna?????After this procedure, no. After Stage 2, Yes with maximal temperature of 102 no earlier than 2 weeks following the procedure.

## 2024-06-10 NOTE — INTERVAL H&P NOTE
H&P reviewed. The patient was examined and there are no changes to the H&P.  Okay to proceed to axionics stage 1.

## 2024-06-10 NOTE — ANESTHESIA POSTPROCEDURE EVALUATION
Patient: Ana Vega    Procedure Summary       Date: 06/10/24 Room / Location: [facility] OR 03 / [facility] OR    Anesthesia Start: 1122 Anesthesia Stop: 1311    Procedure: Insertion Continence Control Stimulator (Back) Diagnosis:       Urinary retention      (Urinary retention [R33.9])    Surgeons: Julio César Gordon MD Responsible Provider: Juan Sal MD    Anesthesia Type: MAC ASA Status: 3            Anesthesia Type: MAC    Vitals Value Taken Time   /59 06/10/24 1315   Temp  06/10/24 1320   Pulse 66 06/10/24 1318   Resp 13 06/10/24 1318   SpO2 98 % 06/10/24 1318   Vitals shown include unfiled device data.    Anesthesia Post Evaluation    Patient location during evaluation: PACU  Patient participation: complete - patient participated  Level of consciousness: awake and sleepy but conscious  Pain score: 2  Pain management: adequate  Airway patency: patent  Cardiovascular status: acceptable, hemodynamically stable and stable  Respiratory status: acceptable and nasal cannula  Hydration status: acceptable  Postoperative Nausea and Vomiting: none        There were no known notable events for this encounter.

## 2024-06-10 NOTE — ANESTHESIA PREPROCEDURE EVALUATION
Patient: Ana Vega    Procedure Information       Date/Time: 06/10/24 1145    Procedure: Insertion Continence Control Stimulator - axonics sacral neuromodulation    Location: [facility] OR 03 / [facility]    Surgeons: Julio César Gordon MD            Relevant Problems   Anesthesia  Past Surgical History:  06/06/2016: BREAST LUMPECTOMY      Comment:  Breast Surgery Lumpectomy  No date: BUNIONECTOMY  No date: CAROTID STENT  03/26/2014: CATARACT EXTRACTION      Comment:  Cataract Surgery  03/26/2014: COLONOSCOPY      Comment:  Complete Colonoscopy  No date: OTHER SURGICAL HISTORY  12/23/2014: US GUIDED THYROID BIOPSY      Comment:  US GUIDED THYROID BIOPSY 12/23/2014 [facility]        Cardiac  EKG 5.21.24:    Normal sinus rhythm  Possible Left atrial enlargement  Borderline ECG  No previous ECGs available  Confirmed by Shanell Hill ([id number]) on 5/27/2024 10:10:43 PM   (+) Atherosclerosis of native arteries of extremities with rest pain, bilateral legs (Multi)   (+) Benign essential hypertension   (+) Chest pain   (+) Coronary artery disease of native artery of native heart with stable angina pectoris (CMS-HCC)   (+) Hyperlipidemia   (+) Murmur   (+) Stented coronary artery      Pulmonary   (+) Lung nodules      Neuro   (+) Cubital tunnel syndrome on left   (+) Lumbar radiculitis   (+) Nerve entrapment syndrome of foot      GI   (+) GERD (gastroesophageal reflux disease)      /Renal   (+) Hyponatremia   (+) UTI (urinary tract infection)      Endocrine   (+) Hypothyroidism   (+) Multiple thyroid nodules      Musculoskeletal   (+) DDD (degenerative disc disease), lumbar   (+) Knee osteoarthritis      ID   (+) UTI (urinary tract infection)      Skin   (+) Rash      GYN   (+) Breast cancer (Multi)       Clinical information reviewed:                 Past Medical History:   Diagnosis Date    Breast cancer (Multi) 2016    Left    Coronary artery disease     Encounter for fitting and adjustment of other  specified devices     Pessary maintenance    Encounter for screening for malignant neoplasm of cervix 12/01/2013    Screening for malignant neoplasm of cervix    Hx antineoplastic chemo     Hyperlipidemia     Hypertension     Hypothyroidism     Other abnormal and inconclusive findings on diagnostic imaging of breast 02/01/2016    Abnormal mammogram    Person consulting for explanation of examination or test findings     Visit for review of DEXA scan    Personal history of irradiation     Personal history of other diseases of the musculoskeletal system and connective tissue     Personal history of arthritis    Personal history of other diseases of urinary system 05/17/2022    History of hematuria    Personal history of other endocrine, nutritional and metabolic disease 06/20/2019    History of hypothyroidism    Personal history of other medical treatment     H/O mammogram    Personal history of pulmonary embolism     History of pulmonary embolism s/p surgery         NPO Detail:  No data recorded     Physical Exam    Airway  Mallampati: I  TM distance: >3 FB  Neck ROM: full     Cardiovascular - normal exam  Rhythm: regular  Rate: normal     Dental   (+) lower dentures  Comments: Partial lower dentures   Pulmonary - normal exam  Breath sounds clear to auscultation     Abdominal            Anesthesia Plan    History of general anesthesia?: yes  History of complications of general anesthesia?: no    ASA 3     MAC     The patient is not a current smoker.    Anesthetic plan and risks discussed with patient.  Use of blood products discussed with patient who consented to blood products.    Plan discussed with [facility].

## 2024-06-18 ENCOUNTER — TELEMEDICINE (OUTPATIENT)
Dept: UROLOGY | Facility: CLINIC | Age: 79
End: 2024-06-18
Payer: MEDICARE

## 2024-06-18 DIAGNOSIS — R33.9 RETENTION OF URINE: Primary | ICD-10-CM

## 2024-06-18 PROCEDURE — 1123F ACP DISCUSS/DSCN MKR DOCD: CPT | Performed by: UROLOGY

## 2024-06-18 PROCEDURE — 99024 POSTOP FOLLOW-UP VISIT: CPT | Performed by: UROLOGY

## 2024-06-18 ASSESSMENT — ENCOUNTER SYMPTOMS
ENDOCRINE NEGATIVE: 1
RESPIRATORY NEGATIVE: 1
CONSTITUTIONAL NEGATIVE: 1
ALLERGIC/IMMUNOLOGIC NEGATIVE: 1
CARDIOVASCULAR NEGATIVE: 1
NEUROLOGICAL NEGATIVE: 1
PSYCHIATRIC NEGATIVE: 1
GASTROINTESTINAL NEGATIVE: 1
HEMATOLOGIC/LYMPHATIC NEGATIVE: 1
MUSCULOSKELETAL NEGATIVE: 1
EYES NEGATIVE: 1

## 2024-06-18 NOTE — PROGRESS NOTES
CHIEF COMPLAINT:  Retention  A telephone visit (audio only) between the patient (at the originating site) and the provider (at the distant site) was utilized to provide this telehealth service.    Verbal consent was requested and obtained fromNAME@ on this date, [unfilled] , for a telehealth visit.     HISTORY OF PRESENT ILLNESS:  1.Retention  2.Chacha   3.Small cystocele    3a. POP-Q: Aa: -1 Ba: -1 C: -6 Gh: 2.5 Pb: 3 TVL: 7.5 Ap: -2.5 Bp: -2.5 D: -7  4.Hx breast CA     This is a telehealth visit to review patient's symptoms.  Patient underwent Axonics stage I on Felicity 10, 2024 for voiding dysfunction and retention.  She reports improvement in flow and frequency. She reports reports some hesitation to finish the flow but she feels that she is emptying better. She reports over 50% improvement in the flow and freq  She is voiding 8, 7 , 4, 15 , 10 oz with each void on her own based on the diary. This was this morning diary.  This is compared to a total voided volume of 2-5 oz per void at the initial days of the therapy   No pain at the incision site. She is feeling the stimulation from the axonics in the right side of the vagina     Review of Systems   Constitutional: Negative.    HENT: Negative.     Eyes: Negative.    Respiratory: Negative.     Cardiovascular: Negative.    Gastrointestinal: Negative.    Endocrine: Negative.    Genitourinary:         REFER TO HPI   Musculoskeletal: Negative.    Skin: Negative.    Allergic/Immunologic: Negative.    Neurological: Negative.    Hematological: Negative.    Psychiatric/Behavioral: Negative.         I personally reviewed the recent UAs today in clinic. They demonstrated   Component  Ref Range & Units 11 d ago 2 mo ago 4 mo ago 1 yr ago   POC Color, Urine  Straw, Yellow, Light-Yellow Yellow   Light Yellow R   POC Appearance, Urine  Clear Clear   Clear   POC Glucose, Urine  NEGATIVE mg/dl NEGATIVE NEGATIVE NEGATIVE NEGATIVE   POC Bilirubin, Urine  NEGATIVE NEGATIVE NEGATIVE  NEGATIVE NEGATIVE   POC Ketones, Urine  NEGATIVE mg/dl NEGATIVE NEGATIVE NEGATIVE NEGATIVE   POC Specific Gravity, Urine  1.005 - 1.035 1.015 1.025 1.025 1.010   POC Blood, Urine  NEGATIVE NEGATIVE LARGE (3+) Abnormal  MODERATE (2+) Abnormal  NEGATIVE   POC PH, Urine  No Reference Range Established PH 6.0 5.5 7.0 5.5   POC Protein, Urine  NEGATIVE, 30 (1+) mg/dl NEGATIVE >=300 (3+) Abnormal  30 (1+) NEGATIVE   POC Urobilinogen, Urine  0.2, 1.0 EU/DL 0.2 0.2 0.2 0.2   Poc Nitrite, Urine  NEGATIVE NEGATIVE NEGATIVE NEGATIVE NEGATIVE   POC Leukocytes, Urine  NEGATIVE NEGATIVE MODERATE (2+) Abnormal  LARGE (3+) Abnormal  NEGATIVE            IMPRESSION AND PLAN:  1.Retention  2.Chacha   3.Small cystocele    3a. POP-Q: Aa: -1 Ba: -1 C: -6 Gh: 2.5 Pb: 3 TVL: 7.5 Ap: -2.5 Bp: -2.5 D: -7  4.Hx breast CA     Anajulius Vega is a 78 y.o.  who presents for evaluation of retention following Axonics stage 1 on 6/10/24.  She reports over 50% improvement in voided volume as well as an reduction in frequency.  She is happy with the outcome of the stage I trial.  She would like to proceed with a stage II on the .       All questions and concerns were answered and addressed.  The patient expressed understanding and agrees with the plan.       SIGNATURES  Julio César Gordon MD

## 2024-06-20 ENCOUNTER — APPOINTMENT (OUTPATIENT)
Dept: UROLOGY | Facility: CLINIC | Age: 79
End: 2024-06-20
Payer: MEDICARE

## 2024-06-26 ENCOUNTER — PRE-ADMISSION TESTING (OUTPATIENT)
Dept: PREADMISSION TESTING | Facility: HOSPITAL | Age: 79
End: 2024-06-26
Payer: MEDICARE

## 2024-06-26 ENCOUNTER — LAB (OUTPATIENT)
Dept: LAB | Facility: LAB | Age: 79
End: 2024-06-26
Payer: MEDICARE

## 2024-06-26 VITALS
WEIGHT: 143.74 LBS | RESPIRATION RATE: 12 BRPM | BODY MASS INDEX: 30.17 KG/M2 | TEMPERATURE: 97.7 F | HEART RATE: 63 BPM | DIASTOLIC BLOOD PRESSURE: 72 MMHG | HEIGHT: 58 IN | OXYGEN SATURATION: 95 % | SYSTOLIC BLOOD PRESSURE: 142 MMHG

## 2024-06-26 DIAGNOSIS — R33.9 URINARY RETENTION: ICD-10-CM

## 2024-06-26 DIAGNOSIS — Z01.818 PRE-OP TESTING: ICD-10-CM

## 2024-06-26 DIAGNOSIS — Z01.818 PRE-OP TESTING: Primary | ICD-10-CM

## 2024-06-26 LAB
ANION GAP SERPL CALC-SCNC: 10 MMOL/L (ref 10–20)
BUN SERPL-MCNC: 14 MG/DL (ref 6–23)
CALCIUM SERPL-MCNC: 10 MG/DL (ref 8.6–10.3)
CHLORIDE SERPL-SCNC: 100 MMOL/L (ref 98–107)
CO2 SERPL-SCNC: 32 MMOL/L (ref 21–32)
CREAT SERPL-MCNC: 0.86 MG/DL (ref 0.5–1.05)
EGFRCR SERPLBLD CKD-EPI 2021: 68 ML/MIN/1.73M*2
GLUCOSE SERPL-MCNC: 91 MG/DL (ref 74–99)
POTASSIUM SERPL-SCNC: 4.4 MMOL/L (ref 3.5–5.3)
SODIUM SERPL-SCNC: 138 MMOL/L (ref 136–145)

## 2024-06-26 PROCEDURE — 36415 COLL VENOUS BLD VENIPUNCTURE: CPT

## 2024-06-26 PROCEDURE — 87081 CULTURE SCREEN ONLY: CPT | Mod: STJLAB

## 2024-06-26 PROCEDURE — 80048 BASIC METABOLIC PNL TOTAL CA: CPT

## 2024-06-26 RX ORDER — CETIRIZINE HYDROCHLORIDE 10 MG/1
10 TABLET ORAL 2 TIMES DAILY
COMMUNITY

## 2024-06-26 ASSESSMENT — DUKE ACTIVITY SCORE INDEX (DASI)
CAN YOU HAVE SEXUAL RELATIONS: NO
CAN YOU PARTICIPATE IN STRENOUS SPORTS LIKE SWIMMING, SINGLES TENNIS, FOOTBALL, BASKETBALL, OR SKIING: NO
CAN YOU PARTICIPATE IN MODERATE RECREATIONAL ACTIVITIES LIKE GOLF, BOWLING, DANCING, DOUBLES TENNIS OR THROWING A BASEBALL OR FOOTBALL: NO
CAN YOU WALK A BLOCK OR TWO ON LEVEL GROUND: YES
CAN YOU DO YARD WORK LIKE RAKING LEAVES, WEEDING OR PUSHING A MOWER: YES
DASI METS SCORE: 6.6
CAN YOU DO HEAVY WORK AROUND THE HOUSE LIKE SCRUBBING FLOORS OR LIFTING AND MOVING HEAVY FURNITURE: NO
CAN YOU DO LIGHT WORK AROUND THE HOUSE LIKE DUSTING OR WASHING DISHES: YES
CAN YOU DO MODERATE WORK AROUND THE HOUSE LIKE VACUUMING, SWEEPING FLOORS OR CARRYING GROCERIES: YES
TOTAL_SCORE: 31.45
CAN YOU RUN A SHORT DISTANCE: YES
CAN YOU CLIMB A FLIGHT OF STAIRS OR WALK UP A HILL: YES
CAN YOU TAKE CARE OF YOURSELF (EAT, DRESS, BATHE, OR USE TOILET): YES
CAN YOU WALK INDOORS, SUCH AS AROUND YOUR HOUSE: YES

## 2024-06-26 ASSESSMENT — PAIN - FUNCTIONAL ASSESSMENT: PAIN_FUNCTIONAL_ASSESSMENT: 0-10

## 2024-06-26 ASSESSMENT — ACTIVITIES OF DAILY LIVING (ADL): ADL_SCORE: 0

## 2024-06-26 ASSESSMENT — LIFESTYLE VARIABLES: SMOKING_STATUS: NONSMOKER

## 2024-06-26 NOTE — PREPROCEDURE INSTRUCTIONS
Medication List            Accurate as of June 26, 2024  8:16 AM. Always use your most recent med list.                acetaminophen 500 mg tablet  Commonly known as: Tylenol  Take 2 tablets (1,000 mg) by mouth every 6 hours if needed for mild pain (1 - 3).  Medication Adjustments for Surgery: Other (Comment)  Notes to patient: May use the morning of surgery if needed     amLODIPine 10 mg tablet  Commonly known as: Norvasc  Medication Adjustments for Surgery: Take morning of surgery with sip of water, no other fluids     aspirin 81 mg EC tablet  Medication Adjustments for Surgery: Stop 7 days before surgery     calcium carbonate 600 mg calcium (1,500 mg) tablet  Medication Adjustments for Surgery: Continue until night before surgery     cetirizine 10 mg tablet  Commonly known as: ZyrTEC  Medication Adjustments for Surgery: Continue until night before surgery     chlorhexidine 0.12 % solution  Commonly known as: Peridex  Swish and spit 15 ml for 2 doses, 15mL the night before surgery and 15 ml morning of surgery - swish for 30 seconds -DO NOT SWALLOW, SPIT OUT  Medication Adjustments for Surgery: Other (Comment)  Notes to patient: As indicated      gabapentin 400 mg capsule  Commonly known as: Neurontin  Take 1 capsule (400 mg) by mouth 3 times a day.  Medication Adjustments for Surgery: Continue until night before surgery  Notes to patient: May take the morning of surgery if this medication is prescribed to take in the mornings     isosorbide mononitrate ER 60 mg 24 hr tablet  Commonly known as: Imdur  Medication Adjustments for Surgery: Continue until night before surgery     levothyroxine 50 mcg tablet  Commonly known as: Synthroid  Take 1 tablet (50 mcg) by mouth once daily.  Medication Adjustments for Surgery: Continue until night before surgery  Notes to patient: May take the morning of surgery if this medication is prescribed to take in the mornings     losartan 50 mg tablet  Commonly known as:  Cozaar  Medication Adjustments for Surgery: Continue until night before surgery  Notes to patient: HOLD any evening dose the night before the day of surgery  HOLD the day of surgery  -should be discontinued for a minimum of 24 hours before surgery     metoprolol tartrate 25 mg tablet  Commonly known as: Lopressor  Medication Adjustments for Surgery: Take morning of surgery with sip of water, no other fluids     multivitamin tablet  Medication Adjustments for Surgery: Stop 7 days before surgery     naproxen 500 mg tablet  Commonly known as: Naprosyn  Take 1 tablet (500 mg) by mouth 2 times a day as needed for mild pain (1 - 3).  Medication Adjustments for Surgery: Stop 7 days before surgery     nitroglycerin 0.4 mg SL tablet  Commonly known as: Nitrostat  Medication Adjustments for Surgery: Other (Comment)  Notes to patient: May use the morning of surgery if needed  -if so, please notify surgeon     omega 3-dha-epa-fish oil 1,200 (144-216) mg capsule  Medication Adjustments for Surgery: Stop 7 days before surgery     omeprazole 40 mg DR capsule  Commonly known as: PriLOSEC  Take 1 capsule (40 mg) by mouth once daily. Do not crush or chew.  Medication Adjustments for Surgery: Take morning of surgery with sip of water, no other fluids     oxyCODONE 5 mg immediate release tablet  Commonly known as: Roxicodone  Take 1 tablet (5 mg) by mouth every 6 hours if needed for severe pain (7 - 10).  Medication Adjustments for Surgery: Other (Comment)  Notes to patient: May use the morning of surgery if needed     rosuvastatin 20 mg tablet  Commonly known as: Crestor  Medication Adjustments for Surgery: Continue until night before surgery  Notes to patient: May take the morning of surgery if this medication is prescribed to take in the mornings                                  PRE-OPERATIVE INSTRUCTIONS    You will receive notification one business day prior to your procedure to confirm your arrival time. It is important that you  answer your phone and/or check your messages during this time. If you do not hear from the surgery center by 5 pm. the day before your procedure, please call [phone].     Please enter the building through the Outpatient entrance and take the elevator off the lobby to the 2nd floor then check in at the Outpatient Surgery desk on the 2nd floor.    INSTRUCTIONS:  Talk to your surgeon for instructions if you should stop your aspirin, blood thinner, or diabetes medicines.  DO NOT take any multivitamins or over the counter supplements for 7-10 days before surgery.  If not being admitted, you must have an adult immediately available to drive you home after surgery. We also highly recommend you have someone stay with you for the entire day and night of your surgery.  For children having surgery, a parent or legal guardian must accompany them to the surgery center. If this is not possible, please call [phone] to make additional arrangements.  For adults who are unable to consent or make medical decisions for themselves, a legal guardian or Power of  must accompany them to the surgery center. If this is not possible, please call [phone] to make additional arrangements.  Wear comfortable, loose fitting clothing.  All jewelry and piercings must be removed. If you are unable to remove an item or have a dermal piercing, please be sure to tell the nurse when you arrive for surgery.  Nail polish and make-up must be removed.  Avoid smoking or consuming alcohol for 24 hours before surgery.  To help prevent infection, please take a shower/bath and wash your hair the night before and/or morning of surgery (or follow other specific bathing instructions provided).    Preoperative Fasting Guidelines    Why must I stop eating and drinking near surgery time?  With sedation, food or liquid in your stomach can enter your lungs causing serious complications  Increases nausea and vomiting    When do I need to stop eating  and drinking before my surgery?  Do not eat any solid food after midnight the night before your surgery/procedure unless otherwise instructed by your surgeon.   You may have up to 13.5 ounces of clear liquid until TWO hours before your instructed arrival time to the hospital.  This includes water, black tea/coffee, (no milk or cream) apple juice, and electrolyte drinks (Gatorade).   You may chew gum until TWO hours before your surgery/procedure      If applicable, notify your surgeons office immediately of any new skin changes that occur to the surgical limb.      If you have any questions or concerns, please call Pre-Admission Testing at [phone].

## 2024-06-26 NOTE — H&P (VIEW-ONLY)
CPM/PAT Evaluation       Name: Ana Vega (Ana Vega)  /Age: 1945/78 y.o.     In-Person       Chief Complaint: urinary retention    VERNON WINCHESTER is a 79 yo female who has had chronic urinary retention with recurrent UTIs and OAB. She follows with urology and has had [employer] sacral neuromodulation trial with good results, so she proceeded to have stage 1 recently placed and now scheduled for stage 2. She has been on prophylactic antibiotics with recent urine culture resulting negative for growth. She denies any dysuria or hematuria. Otherwise denies any recent illness, fever/chills, chest pains or shortness of breath.     Past Medical History:   Diagnosis Date    Breast cancer (Multi) 2016    Left    Coronary artery disease     Encounter for fitting and adjustment of other specified devices     Pessary maintenance    Encounter for screening for malignant neoplasm of cervix 2013    Screening for malignant neoplasm of cervix    GERD (gastroesophageal reflux disease)     Hx antineoplastic chemo     Hyperlipidemia     Hypertension     Hypothyroidism     Other abnormal and inconclusive findings on diagnostic imaging of breast 2016    Abnormal mammogram    Person consulting for explanation of examination or test findings     Visit for review of DEXA scan    Personal history of irradiation     Personal history of other diseases of the musculoskeletal system and connective tissue     Personal history of arthritis    Personal history of other diseases of urinary system 2022    History of hematuria    Personal history of other endocrine, nutritional and metabolic disease 2019    History of hypothyroidism    Personal history of other medical treatment     H/O mammogram    Personal history of pulmonary embolism     History of pulmonary embolism s/p surgery     PCP: Dr. Corea  Cards: Dr. Mcpherson  Onc/hem: Dr. Ortega    Past Surgical History:   Procedure Laterality Date    BREAST  LUMPECTOMY  06/06/2016    Breast Surgery Lumpectomy    BUNIONECTOMY      CAROTID STENT      CATARACT EXTRACTION  03/26/2014    Cataract Surgery    COLONOSCOPY  03/26/2014    Complete Colonoscopy    OTHER SURGICAL HISTORY      SACRAL NEUROMODULATION Right 06/10/2024    [employer]    US GUIDED THYROID BIOPSY  12/23/2014    US GUIDED THYROID BIOPSY 12/23/2014 [facility] ANCILLARY LEGACY       Patient Sexual activity questions deferred to the physician.    Family History   Problem Relation Name Age of Onset    Other (angina pectoris) Mother      Coronary artery disease Mother      Hypertension Mother      Macular degeneration Mother      Emphysema Father      Rheum arthritis Father      Other (acute motor and sensory axonal neuropathy) Sister      Coronary artery disease Brother         Allergies   Allergen Reactions    Diphenhydramine Hives    Lisinopril Cough       Prior to Admission medications    Medication Sig Start Date End Date Taking? Authorizing Provider   acetaminophen (Tylenol) 500 mg tablet Take 2 tablets (1,000 mg) by mouth every 6 hours if needed for mild pain (1 - 3). 6/10/24   Willis Cook MD   amLODIPine (Norvasc) 10 mg tablet Take 1 tablet (10 mg) by mouth once daily. 11/6/13   Historical Provider, MD   aspirin 81 mg EC tablet Take 1 tablet (81 mg) by mouth. 1/30/19   Historical Provider, MD   calcium carbonate 600 mg calcium (1,500 mg) tablet Take 1 tablet (600 mg) by mouth once daily. 9/14/17   Historical Provider, MD   cephalexin (Keflex) 500 mg capsule Take 1 capsule (500 mg) by mouth 2 times a day for 14 days. 6/10/24 6/24/24  Willis Cook MD   chlorhexidine (Peridex) 0.12 % solution Swish and spit 15 ml for 2 doses, 15mL the night before surgery and 15 ml morning of surgery - swish for 30 seconds -DO NOT SWALLOW, SPIT OUT 5/21/24   Lashay Pedroza APRN-CNP   gabapentin (Neurontin) 400 mg capsule Take 1 capsule (400 mg) by mouth 3 times a day. 5/24/24   Jose Corea,    isosorbide mononitrate ER  (Imdur) 60 mg 24 hr tablet Take 1 tablet (60 mg) by mouth once daily. 12/7/15   Historical Provider, MD   levothyroxine (Synthroid) 50 mcg tablet Take 1 tablet (50 mcg) by mouth once daily. 5/7/24   Jose Corea DO   losartan (Cozaar) 50 mg tablet Take 1 tablet (50 mg) by mouth once daily. 4/5/20   Historical Provider, MD   metoprolol tartrate (Lopressor) 25 mg tablet Take 1 tablet (25 mg) by mouth 2 times a day. 3/29/24   Historical Provider, MD   multivitamin tablet Take 1 tablet by mouth once daily.    Historical Provider, MD   naproxen (Naprosyn) 500 mg tablet Take 1 tablet (500 mg) by mouth 2 times a day as needed for mild pain (1 - 3). 6/10/24 7/10/24  Willis Cook MD   nitroglycerin (Nitrostat) 0.4 mg SL tablet Place 1 tablet (0.4 mg) under the tongue. 6/20/19   Historical Provider, MD   omega 3-dha-epa-fish oil 1,200 (144-216) mg capsule Take 1 capsule (1,200 mg) by mouth once daily. 1/30/19   Historical Provider, MD   omeprazole (PriLOSEC) 40 mg DR capsule Take 1 capsule (40 mg) by mouth once daily. Do not crush or chew. 6/3/24   Jose Corea DO   oxyCODONE (Roxicodone) 5 mg immediate release tablet Take 1 tablet (5 mg) by mouth every 6 hours if needed for severe pain (7 - 10). 6/10/24   Willis Cook MD   rosuvastatin (Crestor) 20 mg tablet Take 1 tablet (20 mg) by mouth once daily. 6/20/19   Historical Provider, MD MARY ALICE SALMERON   Constitutional: Negative for fever, chills, or sweats   ENMT: Negative for nasal discharge, congestion, ear pain, mouth pain, throat pain + eyeglasses  Respiratory: Negative for cough, wheezing, shortness of breath   Cardiac: Negative for chest pain, dyspnea on exertion, palpitations   Gastrointestinal: Negative for nausea, vomiting, diarrhea, constipation, abdominal pain    Genitourinary: Negative for dysuria, flank pain, frequency, hematuria: + urinary retention and incontinence, stage 1 stimulator in place    Musculoskeletal: Negative for decreased ROM, pain,  swelling, weakness   Neurological: Negative for dizziness, confusion, headache  Psychiatric: Negative for mood changes   Skin: Negative for itching, rash, ulcer    Hematologic/Lymph: Negative for bruising, easy bleeding  Allergic/Immunologic: Negative itching, sneezing, swelling      Physical Exam  Constitutional:       Appearance: Normal appearance.   HENT:      Head: Normocephalic.      Mouth/Throat:      Mouth: Mucous membranes are moist.   Eyes:      Extraocular Movements: Extraocular movements intact.   Cardiovascular:      Rate and Rhythm: Normal rate and regular rhythm.   Pulmonary:      Effort: Pulmonary effort is normal.      Breath sounds: Normal breath sounds.   Abdominal:      General: Abdomen is flat.      Palpations: Abdomen is soft.   Musculoskeletal:         General: Normal range of motion.      Cervical back: Normal range of motion.   Skin:     General: Skin is warm and dry.   Neurological:      General: No focal deficit present.      Mental Status: She is alert.   Psychiatric:         Mood and Affect: Mood normal.        PAT AIRWAY:   Airway:     Neck ROM::  Full   partials    Anesthesia:  Patient denies any anesthesia complications.     Visit Vitals  /72   Pulse 63   Temp 36.5 °C (97.7 °F) (Temporal)   Resp 12       DASI Risk Score      Flowsheet Row Most Recent Value   DASI SCORE 31.45   METS Score (Will be calculated only when all the questions are answered) 6.6          Caprini DVT Assessment      Flowsheet Row Most Recent Value   DVT Score 11   Current Status Minor surgery planned   History Prior major surgery, Previous malignancy, SVT, DVT/PE   Age Over 75 years   BMI 30 or less          Modified Frailty Index      Flowsheet Row Most Recent Value   Modified Frailty Index Calculator .1818          CHADS2 Stroke Risk  Current as of 3 minutes ago        N/A 3 to 100%: High Risk   2 to < 3%: Medium Risk   0 to < 2%: Low Risk     Last Change: N/A          This score determines the patient's  risk of having a stroke if the patient has atrial fibrillation.        This score is not applicable to this patient. Components are not calculated.          Revised Cardiac Risk Index    No data to display       Apfel Simplified Score    No data to display       Risk Analysis Index Results This Encounter         6/26/2024  0806             HERNANDEZ Cancer History: Patient does not indicate history of cancer    Total Risk Analysis Index Score Without Cancer: 26    Total Risk Analysis Index Score: 26          Stop Bang Score      Flowsheet Row Most Recent Value   Do you snore loudly? 0   Do you often feel tired or fatigued after your sleep? 0   Has anyone ever observed you stop breathing in your sleep? 0   Do you have or are you being treated for high blood pressure? 1   Recent BMI (Calculated) 30.6   Is BMI greater than 35 kg/m2? 0=No   Age older than 50 years old? 1=Yes   Is your neck circumference greater than 17 inches (Male) or 16 inches (Female)? 0   Gender - Male 0=No   STOP-BANG Total Score 2            Assessment and Plan:     Pre-Op  77 yo female scheduled for insertion of continence control stimulator-  [employer] sacral neuromodulation stage 2 under MAC on 7/1/2024 with Dr. Gordon. Blood work and MRSA ordered- has the oral rinse from previous surgery. Urine culture recently completed.  Recent EKG on file from May 2024- NSR. Otherwise no further orders indicated.     Cardiac  -CAD s/p PCI, compliant & taking aspirin, has nitroglycerin as needed   -Palpitations, she is asymptomatic currently, taking beta blocker  -HTN, compliant and taking Norvasc, Losartan and Imdur  -HLD, compliant and taking statin  Follows with cardiology- last seen May 2023, upcoming aptmt in Aug   RCI 0  DASI 31.45 METS 6.6    Uro  -OAB  -cystocele  -Recurrent UTI- completed antibiotic course last week, recent urine culture on 6/3/2024 was negative for growth  -Urinary retention with stage 1 Continence Control Stimulator placed on 6/10/2024  under MAC    Hem/Onc  -Breast cancer, diagnosed with left sided breast cancer in 2016 s/p lumpectomy, 2.2 cm  -follows with Dr. Ortega, last seen in Feb 2024     Hypothyroidism  - medication management on levothyroxine     Anesthesia:  Patient denies any anesthesia complications.   Underwent  Insertion Continence Control Stimulator with MAC on 6/10/2024 with no acute events documented  ASA 3    See risk scores as previously documented.

## 2024-06-26 NOTE — CPM/PAT H&P
CPM/PAT Evaluation       Name: Ana Vega (Ana Vega)  /Age: 1945/78 y.o.     In-Person       Chief Complaint: urinary retention    VERNON WINCHESTER is a 77 yo female who has had chronic urinary retention with recurrent UTIs and OAB. She follows with urology and has had [employer] sacral neuromodulation trial with good results, so she proceeded to have stage 1 recently placed and now scheduled for stage 2. She has been on prophylactic antibiotics with recent urine culture resulting negative for growth. She denies any dysuria or hematuria. Otherwise denies any recent illness, fever/chills, chest pains or shortness of breath.     Past Medical History:   Diagnosis Date    Breast cancer (Multi) 2016    Left    Coronary artery disease     Encounter for fitting and adjustment of other specified devices     Pessary maintenance    Encounter for screening for malignant neoplasm of cervix 2013    Screening for malignant neoplasm of cervix    GERD (gastroesophageal reflux disease)     Hx antineoplastic chemo     Hyperlipidemia     Hypertension     Hypothyroidism     Other abnormal and inconclusive findings on diagnostic imaging of breast 2016    Abnormal mammogram    Person consulting for explanation of examination or test findings     Visit for review of DEXA scan    Personal history of irradiation     Personal history of other diseases of the musculoskeletal system and connective tissue     Personal history of arthritis    Personal history of other diseases of urinary system 2022    History of hematuria    Personal history of other endocrine, nutritional and metabolic disease 2019    History of hypothyroidism    Personal history of other medical treatment     H/O mammogram    Personal history of pulmonary embolism     History of pulmonary embolism s/p surgery     PCP: Dr. Corea  Cards: Dr. Mcpherson  Onc/hem: Dr. Ortega    Past Surgical History:   Procedure Laterality Date    BREAST  LUMPECTOMY  06/06/2016    Breast Surgery Lumpectomy    BUNIONECTOMY      CAROTID STENT      CATARACT EXTRACTION  03/26/2014    Cataract Surgery    COLONOSCOPY  03/26/2014    Complete Colonoscopy    OTHER SURGICAL HISTORY      SACRAL NEUROMODULATION Right 06/10/2024    [employer]    US GUIDED THYROID BIOPSY  12/23/2014    US GUIDED THYROID BIOPSY 12/23/2014 [facility] ANCILLARY LEGACY       Patient Sexual activity questions deferred to the physician.    Family History   Problem Relation Name Age of Onset    Other (angina pectoris) Mother      Coronary artery disease Mother      Hypertension Mother      Macular degeneration Mother      Emphysema Father      Rheum arthritis Father      Other (acute motor and sensory axonal neuropathy) Sister      Coronary artery disease Brother         Allergies   Allergen Reactions    Diphenhydramine Hives    Lisinopril Cough       Prior to Admission medications    Medication Sig Start Date End Date Taking? Authorizing Provider   acetaminophen (Tylenol) 500 mg tablet Take 2 tablets (1,000 mg) by mouth every 6 hours if needed for mild pain (1 - 3). 6/10/24   Willis Cook MD   amLODIPine (Norvasc) 10 mg tablet Take 1 tablet (10 mg) by mouth once daily. 11/6/13   Historical Provider, MD   aspirin 81 mg EC tablet Take 1 tablet (81 mg) by mouth. 1/30/19   Historical Provider, MD   calcium carbonate 600 mg calcium (1,500 mg) tablet Take 1 tablet (600 mg) by mouth once daily. 9/14/17   Historical Provider, MD   cephalexin (Keflex) 500 mg capsule Take 1 capsule (500 mg) by mouth 2 times a day for 14 days. 6/10/24 6/24/24  Willis Cook MD   chlorhexidine (Peridex) 0.12 % solution Swish and spit 15 ml for 2 doses, 15mL the night before surgery and 15 ml morning of surgery - swish for 30 seconds -DO NOT SWALLOW, SPIT OUT 5/21/24   Lashay Pedroza APRN-CNP   gabapentin (Neurontin) 400 mg capsule Take 1 capsule (400 mg) by mouth 3 times a day. 5/24/24   Jose Corea,    isosorbide mononitrate ER  (Imdur) 60 mg 24 hr tablet Take 1 tablet (60 mg) by mouth once daily. 12/7/15   Historical Provider, MD   levothyroxine (Synthroid) 50 mcg tablet Take 1 tablet (50 mcg) by mouth once daily. 5/7/24   Jose Corea DO   losartan (Cozaar) 50 mg tablet Take 1 tablet (50 mg) by mouth once daily. 4/5/20   Historical Provider, MD   metoprolol tartrate (Lopressor) 25 mg tablet Take 1 tablet (25 mg) by mouth 2 times a day. 3/29/24   Historical Provider, MD   multivitamin tablet Take 1 tablet by mouth once daily.    Historical Provider, MD   naproxen (Naprosyn) 500 mg tablet Take 1 tablet (500 mg) by mouth 2 times a day as needed for mild pain (1 - 3). 6/10/24 7/10/24  Willis Cook MD   nitroglycerin (Nitrostat) 0.4 mg SL tablet Place 1 tablet (0.4 mg) under the tongue. 6/20/19   Historical Provider, MD   omega 3-dha-epa-fish oil 1,200 (144-216) mg capsule Take 1 capsule (1,200 mg) by mouth once daily. 1/30/19   Historical Provider, MD   omeprazole (PriLOSEC) 40 mg DR capsule Take 1 capsule (40 mg) by mouth once daily. Do not crush or chew. 6/3/24   Jose Corea DO   oxyCODONE (Roxicodone) 5 mg immediate release tablet Take 1 tablet (5 mg) by mouth every 6 hours if needed for severe pain (7 - 10). 6/10/24   Willis Cook MD   rosuvastatin (Crestor) 20 mg tablet Take 1 tablet (20 mg) by mouth once daily. 6/20/19   Historical Provider, MD MARY ALICE SALMERON   Constitutional: Negative for fever, chills, or sweats   ENMT: Negative for nasal discharge, congestion, ear pain, mouth pain, throat pain + eyeglasses  Respiratory: Negative for cough, wheezing, shortness of breath   Cardiac: Negative for chest pain, dyspnea on exertion, palpitations   Gastrointestinal: Negative for nausea, vomiting, diarrhea, constipation, abdominal pain    Genitourinary: Negative for dysuria, flank pain, frequency, hematuria: + urinary retention and incontinence, stage 1 stimulator in place    Musculoskeletal: Negative for decreased ROM, pain,  swelling, weakness   Neurological: Negative for dizziness, confusion, headache  Psychiatric: Negative for mood changes   Skin: Negative for itching, rash, ulcer    Hematologic/Lymph: Negative for bruising, easy bleeding  Allergic/Immunologic: Negative itching, sneezing, swelling      Physical Exam  Constitutional:       Appearance: Normal appearance.   HENT:      Head: Normocephalic.      Mouth/Throat:      Mouth: Mucous membranes are moist.   Eyes:      Extraocular Movements: Extraocular movements intact.   Cardiovascular:      Rate and Rhythm: Normal rate and regular rhythm.   Pulmonary:      Effort: Pulmonary effort is normal.      Breath sounds: Normal breath sounds.   Abdominal:      General: Abdomen is flat.      Palpations: Abdomen is soft.   Musculoskeletal:         General: Normal range of motion.      Cervical back: Normal range of motion.   Skin:     General: Skin is warm and dry.   Neurological:      General: No focal deficit present.      Mental Status: She is alert.   Psychiatric:         Mood and Affect: Mood normal.        PAT AIRWAY:   Airway:     Neck ROM::  Full   partials    Anesthesia:  Patient denies any anesthesia complications.     Visit Vitals  /72   Pulse 63   Temp 36.5 °C (97.7 °F) (Temporal)   Resp 12       DASI Risk Score      Flowsheet Row Most Recent Value   DASI SCORE 31.45   METS Score (Will be calculated only when all the questions are answered) 6.6          Caprini DVT Assessment      Flowsheet Row Most Recent Value   DVT Score 11   Current Status Minor surgery planned   History Prior major surgery, Previous malignancy, SVT, DVT/PE   Age Over 75 years   BMI 30 or less          Modified Frailty Index      Flowsheet Row Most Recent Value   Modified Frailty Index Calculator .1818          CHADS2 Stroke Risk  Current as of 3 minutes ago        N/A 3 to 100%: High Risk   2 to < 3%: Medium Risk   0 to < 2%: Low Risk     Last Change: N/A          This score determines the patient's  risk of having a stroke if the patient has atrial fibrillation.        This score is not applicable to this patient. Components are not calculated.          Revised Cardiac Risk Index    No data to display       Apfel Simplified Score    No data to display       Risk Analysis Index Results This Encounter         6/26/2024  0806             HERNANDEZ Cancer History: Patient does not indicate history of cancer    Total Risk Analysis Index Score Without Cancer: 26    Total Risk Analysis Index Score: 26          Stop Bang Score      Flowsheet Row Most Recent Value   Do you snore loudly? 0   Do you often feel tired or fatigued after your sleep? 0   Has anyone ever observed you stop breathing in your sleep? 0   Do you have or are you being treated for high blood pressure? 1   Recent BMI (Calculated) 30.6   Is BMI greater than 35 kg/m2? 0=No   Age older than 50 years old? 1=Yes   Is your neck circumference greater than 17 inches (Male) or 16 inches (Female)? 0   Gender - Male 0=No   STOP-BANG Total Score 2            Assessment and Plan:     Pre-Op  77 yo female scheduled for insertion of continence control stimulator-  [employer] sacral neuromodulation stage 2 under MAC on 7/1/2024 with Dr. Gordon. Blood work and MRSA ordered- has the oral rinse from previous surgery. Urine culture recently completed.  Recent EKG on file from May 2024- NSR. Otherwise no further orders indicated.     Cardiac  -CAD s/p PCI, compliant & taking aspirin, has nitroglycerin as needed   -Palpitations, she is asymptomatic currently, taking beta blocker  -HTN, compliant and taking Norvasc, Losartan and Imdur  -HLD, compliant and taking statin  Follows with cardiology- last seen May 2023, upcoming aptmt in Aug   RCI 0  DASI 31.45 METS 6.6    Uro  -OAB  -cystocele  -Recurrent UTI- completed antibiotic course last week, recent urine culture on 6/3/2024 was negative for growth  -Urinary retention with stage 1 Continence Control Stimulator placed on 6/10/2024  under MAC    Hem/Onc  -Breast cancer, diagnosed with left sided breast cancer in 2016 s/p lumpectomy, 2.2 cm  -follows with Dr. Ortega, last seen in Feb 2024     Hypothyroidism  - medication management on levothyroxine     Anesthesia:  Patient denies any anesthesia complications.   Underwent  Insertion Continence Control Stimulator with MAC on 6/10/2024 with no acute events documented  ASA 3    See risk scores as previously documented.

## 2024-06-27 ENCOUNTER — APPOINTMENT (OUTPATIENT)
Dept: UROLOGY | Facility: CLINIC | Age: 79
End: 2024-06-27
Payer: MEDICARE

## 2024-06-28 LAB — STAPHYLOCOCCUS SPEC CULT: NORMAL

## 2024-07-01 ENCOUNTER — ANESTHESIA (OUTPATIENT)
Dept: OPERATING ROOM | Facility: HOSPITAL | Age: 79
End: 2024-07-01
Payer: MEDICARE

## 2024-07-01 ENCOUNTER — ANESTHESIA EVENT (OUTPATIENT)
Dept: OPERATING ROOM | Facility: HOSPITAL | Age: 79
End: 2024-07-01
Payer: MEDICARE

## 2024-07-01 ENCOUNTER — TELEPHONE (OUTPATIENT)
Dept: PRIMARY CARE | Facility: CLINIC | Age: 79
End: 2024-07-01
Payer: MEDICARE

## 2024-07-01 ENCOUNTER — HOSPITAL ENCOUNTER (OUTPATIENT)
Facility: HOSPITAL | Age: 79
Setting detail: OUTPATIENT SURGERY
Discharge: HOME | End: 2024-07-01
Attending: UROLOGY | Admitting: UROLOGY
Payer: MEDICARE

## 2024-07-01 VITALS
RESPIRATION RATE: 18 BRPM | OXYGEN SATURATION: 92 % | SYSTOLIC BLOOD PRESSURE: 154 MMHG | DIASTOLIC BLOOD PRESSURE: 70 MMHG | BODY MASS INDEX: 30.2 KG/M2 | WEIGHT: 140 LBS | TEMPERATURE: 97 F | HEART RATE: 61 BPM | HEIGHT: 57 IN

## 2024-07-01 DIAGNOSIS — M79.602 PARESTHESIA AND PAIN OF BOTH UPPER EXTREMITIES: ICD-10-CM

## 2024-07-01 DIAGNOSIS — R20.2 PARESTHESIA AND PAIN OF BOTH UPPER EXTREMITIES: ICD-10-CM

## 2024-07-01 DIAGNOSIS — G89.18 ACUTE POST-OPERATIVE PAIN: ICD-10-CM

## 2024-07-01 DIAGNOSIS — M79.601 PARESTHESIA AND PAIN OF BOTH UPPER EXTREMITIES: ICD-10-CM

## 2024-07-01 DIAGNOSIS — R33.9 URINARY RETENTION: ICD-10-CM

## 2024-07-01 DIAGNOSIS — R35.0 FREQUENT URINATION: Primary | ICD-10-CM

## 2024-07-01 PROCEDURE — 64590 INS/RPL PRPH SAC/GSTR NPG/R: CPT | Performed by: UROLOGY

## 2024-07-01 PROCEDURE — 2780000003 HC OR 278 NO HCPCS: Performed by: UROLOGY

## 2024-07-01 PROCEDURE — 3600000009 HC OR TIME - EACH INCREMENTAL 1 MINUTE - PROCEDURE LEVEL FOUR: Performed by: UROLOGY

## 2024-07-01 PROCEDURE — A64590 PR IMPLANT PERIPH/GASTRIC NEUROSTIM/RECEIVER: Performed by: ANESTHESIOLOGIST ASSISTANT

## 2024-07-01 PROCEDURE — 95972 ALYS CPLX SP/PN NPGT W/PRGRM: CPT | Performed by: UROLOGY

## 2024-07-01 PROCEDURE — 2500000004 HC RX 250 GENERAL PHARMACY W/ HCPCS (ALT 636 FOR OP/ED): Mod: JZ | Performed by: UROLOGY

## 2024-07-01 PROCEDURE — 3700000001 HC GENERAL ANESTHESIA TIME - INITIAL BASE CHARGE: Performed by: UROLOGY

## 2024-07-01 PROCEDURE — C1767 GENERATOR, NEURO NON-RECHARG: HCPCS | Performed by: UROLOGY

## 2024-07-01 PROCEDURE — 2720000007 HC OR 272 NO HCPCS: Performed by: UROLOGY

## 2024-07-01 PROCEDURE — 2500000004 HC RX 250 GENERAL PHARMACY W/ HCPCS (ALT 636 FOR OP/ED): Performed by: ANESTHESIOLOGIST ASSISTANT

## 2024-07-01 PROCEDURE — 7100000010 HC PHASE TWO TIME - EACH INCREMENTAL 1 MINUTE: Performed by: UROLOGY

## 2024-07-01 PROCEDURE — 3600000004 HC OR TIME - INITIAL BASE CHARGE - PROCEDURE LEVEL FOUR: Performed by: UROLOGY

## 2024-07-01 PROCEDURE — 99100 ANES PT EXTEME AGE<1 YR&>70: CPT | Performed by: STUDENT IN AN ORGANIZED HEALTH CARE EDUCATION/TRAINING PROGRAM

## 2024-07-01 PROCEDURE — 3700000002 HC GENERAL ANESTHESIA TIME - EACH INCREMENTAL 1 MINUTE: Performed by: UROLOGY

## 2024-07-01 PROCEDURE — A64590 PR IMPLANT PERIPH/GASTRIC NEUROSTIM/RECEIVER: Performed by: STUDENT IN AN ORGANIZED HEALTH CARE EDUCATION/TRAINING PROGRAM

## 2024-07-01 PROCEDURE — 7100000009 HC PHASE TWO TIME - INITIAL BASE CHARGE: Performed by: UROLOGY

## 2024-07-01 DEVICE — NEUROSTIMULATOR
Type: IMPLANTABLE DEVICE | Site: BACK | Status: FUNCTIONAL
Brand: AXONICS

## 2024-07-01 RX ORDER — CEFAZOLIN SODIUM 2 G/100ML
2 INJECTION, SOLUTION INTRAVENOUS ONCE
Status: COMPLETED | OUTPATIENT
Start: 2024-07-01 | End: 2024-07-01

## 2024-07-01 RX ORDER — MIDAZOLAM HYDROCHLORIDE 1 MG/ML
INJECTION, SOLUTION INTRAMUSCULAR; INTRAVENOUS AS NEEDED
Status: DISCONTINUED | OUTPATIENT
Start: 2024-07-01 | End: 2024-07-01

## 2024-07-01 RX ORDER — ONDANSETRON HYDROCHLORIDE 2 MG/ML
INJECTION, SOLUTION INTRAVENOUS AS NEEDED
Status: DISCONTINUED | OUTPATIENT
Start: 2024-07-01 | End: 2024-07-01

## 2024-07-01 RX ORDER — GABAPENTIN 400 MG/1
400 CAPSULE ORAL 3 TIMES DAILY
Qty: 270 CAPSULE | Refills: 0 | Status: SHIPPED | OUTPATIENT
Start: 2024-07-01

## 2024-07-01 RX ORDER — BUPIVACAINE HYDROCHLORIDE 5 MG/ML
INJECTION, SOLUTION EPIDURAL; INTRACAUDAL AS NEEDED
Status: DISCONTINUED | OUTPATIENT
Start: 2024-07-01 | End: 2024-07-01 | Stop reason: HOSPADM

## 2024-07-01 RX ORDER — PROPOFOL 10 MG/ML
INJECTION, EMULSION INTRAVENOUS AS NEEDED
Status: DISCONTINUED | OUTPATIENT
Start: 2024-07-01 | End: 2024-07-01

## 2024-07-01 RX ORDER — ACETAMINOPHEN 500 MG
1000 TABLET ORAL EVERY 6 HOURS PRN
Qty: 30 TABLET | Refills: 0 | Status: SHIPPED | OUTPATIENT
Start: 2024-07-01 | End: 2024-07-04 | Stop reason: WASHOUT

## 2024-07-01 RX ORDER — FENTANYL CITRATE 50 UG/ML
INJECTION, SOLUTION INTRAMUSCULAR; INTRAVENOUS CONTINUOUS PRN
Status: DISCONTINUED | OUTPATIENT
Start: 2024-07-01 | End: 2024-07-01

## 2024-07-01 RX ORDER — SODIUM CHLORIDE 9 MG/ML
100 INJECTION, SOLUTION INTRAVENOUS CONTINUOUS
Status: DISCONTINUED | OUTPATIENT
Start: 2024-07-01 | End: 2024-07-01 | Stop reason: HOSPADM

## 2024-07-01 ASSESSMENT — COLUMBIA-SUICIDE SEVERITY RATING SCALE - C-SSRS
2. HAVE YOU ACTUALLY HAD ANY THOUGHTS OF KILLING YOURSELF?: NO
6. HAVE YOU EVER DONE ANYTHING, STARTED TO DO ANYTHING, OR PREPARED TO DO ANYTHING TO END YOUR LIFE?: NO
1. IN THE PAST MONTH, HAVE YOU WISHED YOU WERE DEAD OR WISHED YOU COULD GO TO SLEEP AND NOT WAKE UP?: NO

## 2024-07-01 ASSESSMENT — PAIN - FUNCTIONAL ASSESSMENT
PAIN_FUNCTIONAL_ASSESSMENT: 0-10

## 2024-07-01 ASSESSMENT — PAIN SCALES - GENERAL
PAINLEVEL_OUTOF10: 0 - NO PAIN

## 2024-07-01 NOTE — TELEPHONE ENCOUNTER
Refill Gabapentin 400mg TID     Pharmacy: [facility]     LR: 05/24/24  LV: 05/24/24  NV: 08/23/24

## 2024-07-01 NOTE — OP NOTE
Insertion Continence Control Stimulator Operative Note     Date: 2024  OR Location: [facility]    Name: Ana Vega, : 1945, Age: 78 y.o., MRN: [id number], Sex: female    Diagnosis  Pre-op Diagnosis     * Urinary retention [R33.9] Post-op Diagnosis     * Urinary retention [R33.9]     Procedures  Insertion Continence Control Stimulator  23746 - RI INS/RPLC PERPH SAC/GSTRC NPG/RCVR PCKT CRTJ&CONN      Surgeons      * Julio César Gordon - Primary    Resident/Fellow/Other Assistant:  Surgeons and Role:     * Jovanni Sosa MD - Resident - Assisting    Procedure Summary  Anesthesia: General  ASA: III  Anesthesia Staff: Anesthesiologist: Michelle Gipson MD  C-AA: DEE Hernandez  Estimated Blood Loss: 2mL  Intra-op Medications: Administrations occurring from 1340 to 1500 on 24:  * No intraprocedure medications in log *        Specimen: No specimens collected     Staff:   Circulator: Zoila Forrester Person: Nathaly         Drains and/or Catheters: * None in log *    Tourniquet Times:         Implants:  Implants       Type Name Action Serial No.      Neuro Interventional Implant NEUROSTIMULATOR, F15 - [id number] Implanted [id number]              Findings: Technically successful stage 2 sacral neuromodulator implantation in the right lower back    Indications: Ana Vega is an 78 y.o. female who is having surgery for Urinary retention [R33.9].     The patient was seen in the preoperative area. The risks, benefits, complications, treatment options, non-operative alternatives, expected recovery and outcomes were discussed with the patient. The possibilities of reaction to medication, pulmonary aspiration, injury to surrounding structures, bleeding, recurrent infection, the need for additional procedures, failure to diagnose a condition, and creating a complication requiring transfusion or operation were discussed with the patient. The patient concurred with the proposed plan, giving informed consent.   The site of surgery was properly noted/marked if necessary per policy. The patient has been actively warmed in preoperative area. Preoperative antibiotics have been ordered and given within 1 hours of incision. Venous thrombosis prophylaxis have been ordered including bilateral sequential compression devices    Procedure Details: Stage 2 sacral neruomodulator implantation  Complications:  None; patient tolerated the procedure well.    Disposition: PACU - hemodynamically stable.  Condition: stable         Additional Details:   The patient was interviewed in the preop holding area by the surgical team, where the risks, benefits, and indication of the procedure were reviewed.  She was then transferred to the operating suite where the patient was properly identified and the procedure was confirmed. When adequate sedation was obtained, the patient was prepped and draped in a prone position.  A time-out was performed. Preoperative antibiotics were given.    The previous incision was injected with 0.5% marcaine and was re-opened with a scalpel. The temporary electrode was disconnected and brought out of the skin. A pouch was created in the deep subcutaneous space of the right buttock. Hemostasis was assured.    The IPG was then connected to the permanent electrode and placed in the deep subcutaneous space of the right buttock. Impedances were checked and were noted to be within normal limits. Once the IPG was confirmed to be in satisfactory position, the incision site was closed by approximating the Fe's fascia with a 3-0 Vicryl suture and the skin back together with a 4-0 Vicryl suture. Dermabond was then placed over the wound.    The patient was then awakened from anesthesia, having tolerated procedure well, and was taken to the recovery room in stable condition. All sponge, needle, and instrument counts were correct at the end the case.      Attending Attestation:     Julio César Gordon  Phone Number: [phone]

## 2024-07-01 NOTE — ANESTHESIA PREPROCEDURE EVALUATION
Patient: Ana Vega    Procedure Information       Date/Time: 07/01/24 1340    Procedure: Insertion Continence Control Stimulator    Location: [facility] OR 03 / [facility]    Surgeons: Julio César Gordon MD            Relevant Problems   Anesthesia (within normal limits)      Cardiac   (+) Atherosclerosis of native arteries of extremities with rest pain, bilateral legs (Multi)   (+) Benign essential hypertension   (+) Chest pain   (+) Coronary artery disease of native artery of native heart with stable angina pectoris (CMS-HCC)   (+) Hyperlipidemia   (+) Murmur   (+) Stented coronary artery      Pulmonary   (+) Lung nodules      Neuro   (+) Cubital tunnel syndrome on left   (+) Lumbar radiculitis   (+) Nerve entrapment syndrome of foot      GI   (+) GERD (gastroesophageal reflux disease)      /Renal   (+) Hyponatremia   (+) UTI (urinary tract infection)      Endocrine   (+) Hypothyroidism   (+) Multiple thyroid nodules      Musculoskeletal   (+) Compression fracture of L1 lumbar vertebra (Multi)   (+) DDD (degenerative disc disease), lumbar   (+) Knee osteoarthritis      ID   (+) UTI (urinary tract infection)      Skin   (+) Rash      GYN   (+) Breast cancer (Multi)      Respiratory   (+) Acute bronchitis       Clinical information reviewed:                   NPO Detail:  No data recorded     Physical Exam    Airway  Mallampati: II  TM distance: >3 FB  Neck ROM: full     Cardiovascular   Rhythm: regular  Rate: normal     Dental    Pulmonary   Breath sounds clear to auscultation     Abdominal   Abdomen: soft             Anesthesia Plan    History of general anesthesia?: yes  History of complications of general anesthesia?: no    ASA 3     general     intravenous induction   Postoperative administration of opioids is intended.  Anesthetic plan and risks discussed with patient.  Use of blood products discussed with patient who.    Plan discussed with CAA, CRNA and attending.

## 2024-07-02 NOTE — ANESTHESIA POSTPROCEDURE EVALUATION
Patient: Ana Vega    Procedure Summary       Date: 07/01/24 Room / Location: [facility] 03 / [facility]    Anesthesia Start: 1203 Anesthesia Stop: 1242    Procedure: Insertion Continence Control Stimulator (Back) Diagnosis:       Urinary retention      (Urinary retention [R33.9])    Surgeons: Julio César Gordon MD Responsible Provider: Michelle Gipson MD    Anesthesia Type: general ASA Status: 3            Anesthesia Type: general    Vitals Value Taken Time   /70 07/01/24 1323   Temp 36.0 07/02/24 1101   Pulse 61 07/01/24 1323   Resp 18 07/01/24 1323   SpO2 92 % 07/01/24 1323       Anesthesia Post Evaluation    Patient location during evaluation: PACU  Patient participation: complete - patient participated  Level of consciousness: awake and alert  Pain management: adequate  Airway patency: patent  Cardiovascular status: acceptable  Respiratory status: acceptable  Hydration status: acceptable  Postoperative Nausea and Vomiting: none        No notable events documented.

## 2024-07-03 ENCOUNTER — OFFICE VISIT (OUTPATIENT)
Dept: PRIMARY CARE | Facility: CLINIC | Age: 79
End: 2024-07-03
Payer: MEDICARE

## 2024-07-03 VITALS
WEIGHT: 143.74 LBS | SYSTOLIC BLOOD PRESSURE: 142 MMHG | DIASTOLIC BLOOD PRESSURE: 106 MMHG | BODY MASS INDEX: 31.11 KG/M2 | TEMPERATURE: 98.3 F

## 2024-07-03 DIAGNOSIS — R30.0 DYSURIA: ICD-10-CM

## 2024-07-03 LAB
POC APPEARANCE, URINE: ABNORMAL
POC BILIRUBIN, URINE: NEGATIVE
POC BLOOD, URINE: ABNORMAL
POC COLOR, URINE: YELLOW
POC GLUCOSE, URINE: NEGATIVE MG/DL
POC KETONES, URINE: NEGATIVE MG/DL
POC LEUKOCYTES, URINE: NEGATIVE
POC NITRITE,URINE: NEGATIVE
POC PH, URINE: 8.5 PH
POC PROTEIN, URINE: NEGATIVE MG/DL
POC SPECIFIC GRAVITY, URINE: 1.01
POC UROBILINOGEN, URINE: 0.2 EU/DL

## 2024-07-03 PROCEDURE — 3080F DIAST BP >= 90 MM HG: CPT | Performed by: FAMILY MEDICINE

## 2024-07-03 PROCEDURE — 1159F MED LIST DOCD IN RCRD: CPT | Performed by: FAMILY MEDICINE

## 2024-07-03 PROCEDURE — 3077F SYST BP >= 140 MM HG: CPT | Performed by: FAMILY MEDICINE

## 2024-07-03 PROCEDURE — 81003 URINALYSIS AUTO W/O SCOPE: CPT | Performed by: FAMILY MEDICINE

## 2024-07-03 PROCEDURE — 87086 URINE CULTURE/COLONY COUNT: CPT

## 2024-07-03 PROCEDURE — 1123F ACP DISCUSS/DSCN MKR DOCD: CPT | Performed by: FAMILY MEDICINE

## 2024-07-03 PROCEDURE — 1160F RVW MEDS BY RX/DR IN RCRD: CPT | Performed by: FAMILY MEDICINE

## 2024-07-03 PROCEDURE — 99213 OFFICE O/P EST LOW 20 MIN: CPT | Performed by: FAMILY MEDICINE

## 2024-07-03 RX ORDER — SULFAMETHOXAZOLE AND TRIMETHOPRIM 800; 160 MG/1; MG/1
1 TABLET ORAL 2 TIMES DAILY
Qty: 6 TABLET | Refills: 0 | Status: SHIPPED | OUTPATIENT
Start: 2024-07-03 | End: 2024-07-06

## 2024-07-03 NOTE — PATIENT INSTRUCTIONS
I recommend that you eat less than 2,000 mg of sodium per day. (A single teaspoon of salt has about 2,300 mg of sodium.) Your body really only needs around 500 mg of sodium (about ¼ teaspoon of salt), but most people get 4,000 to 6,000 mg per day. Follow up with Dr Corea in one week to have your blood pressure reevaluated.     Please call if you are not improving within two days.

## 2024-07-03 NOTE — PROGRESS NOTES
Subjective   Patient ID: [id number]     Ana Vega is a 78 y.o. female who presents for UTI (Urinary frequency, burning, pressure since yesterday).    HPI  Burning with urination started yesterday;  has had frequency, urgency..Eye exams & tests to be performed upon return:  all night every twenty minutes    Review of Systems  ID-no fever or chills  GI-No diarrhea  PULM-No wheezing, coughing or shortness of breath  UROL-No  blood in urine, or incontinence; nocturia not present  GYN-no vaginal discharge  MS-no new back pain    Objective     BP (!) 142/106 (BP Location: Right arm, Patient Position: Sitting)   Temp 36.8 °C (98.3 °F) (Oral)   Wt 65.2 kg (143 lb 11.8 oz)   BMI 31.11 kg/m²      Physical Exam  Back-without CA or spine tenderness  Heart- regular rate and rhythm, normal s1 and s2 without murmur or gallop; no edema  Lungs-clear to auscultation  Abdomen-soft, positive bowel sounds, without masses, HSmegaly or pain;  no bladder tenderness    Assessment/Plan     Problem List Items Addressed This Visit       Dysuria    Relevant Medications    sulfamethoxazole-trimethoprim (Bactrim DS) 800-160 mg tablet    Other Relevant Orders    POCT UA Automated manually resulted    Urine Culture     I recommend that you eat less than 2,000 mg of sodium per day. (A single teaspoon of salt has about 2,300 mg of sodium.) Your body really only needs around 500 mg of sodium (about ¼ teaspoon of salt), but most people get 4,000 to 6,000 mg per day. Follow up with Dr Corea in one week to have your blood pressure reevaluated.     Please call if you are not improving within two days.    Tone Courtney MD

## 2024-07-04 DIAGNOSIS — R30.0 DYSURIA: Primary | ICD-10-CM

## 2024-07-05 LAB — BACTERIA UR CULT: NO GROWTH

## 2024-07-11 ENCOUNTER — APPOINTMENT (OUTPATIENT)
Dept: PRIMARY CARE | Facility: CLINIC | Age: 79
End: 2024-07-11
Payer: MEDICARE

## 2024-07-11 VITALS
TEMPERATURE: 98 F | DIASTOLIC BLOOD PRESSURE: 70 MMHG | WEIGHT: 143 LBS | BODY MASS INDEX: 30.94 KG/M2 | SYSTOLIC BLOOD PRESSURE: 142 MMHG

## 2024-07-11 DIAGNOSIS — R31.29 OTHER MICROSCOPIC HEMATURIA: ICD-10-CM

## 2024-07-11 DIAGNOSIS — R39.9 UTI SYMPTOMS: Primary | ICD-10-CM

## 2024-07-11 PROCEDURE — 1159F MED LIST DOCD IN RCRD: CPT | Performed by: FAMILY MEDICINE

## 2024-07-11 PROCEDURE — 3077F SYST BP >= 140 MM HG: CPT | Performed by: FAMILY MEDICINE

## 2024-07-11 PROCEDURE — 99213 OFFICE O/P EST LOW 20 MIN: CPT | Performed by: FAMILY MEDICINE

## 2024-07-11 PROCEDURE — 3078F DIAST BP <80 MM HG: CPT | Performed by: FAMILY MEDICINE

## 2024-07-11 PROCEDURE — 1036F TOBACCO NON-USER: CPT | Performed by: FAMILY MEDICINE

## 2024-07-11 PROCEDURE — 1160F RVW MEDS BY RX/DR IN RCRD: CPT | Performed by: FAMILY MEDICINE

## 2024-07-11 PROCEDURE — 1123F ACP DISCUSS/DSCN MKR DOCD: CPT | Performed by: FAMILY MEDICINE

## 2024-07-11 NOTE — PATIENT INSTRUCTIONS
It is good that your UTI symptoms are gone.  You recently had a bladder pacemaker placed on 7/1/24.  I suggested a urinalysis to recheck you for blood in the urine.  You cannot void. It is likely to be benign.  A urinalysis can be rechecked at your next visit here in August.

## 2024-07-11 NOTE — PROGRESS NOTES
Subjective   Patient ID: [id number]     Ana Vega is a 78 y.o. female who presents for Follow-up.  HPI  She is here for a recheck.  She saw Dr Courtney 7/3/24 for UTI symptoms.  She was given Bactrim for a UTI.  Urinalysis showed cloudy urine and trace blood but her urine culture came back negative.     Her symptoms went away three days after she was seen here.  She took the antibiotics.  She thinks the antibiotics helped her.     No dysuria.  No increased frequency.  No abd pain, fever or back pain.           Objective     /70 (BP Location: Right arm, Patient Position: Sitting)   Temp 36.7 °C (98 °F) (Skin)   Wt 64.9 kg (143 lb)   BMI 30.94 kg/m²      Physical Exam  Constitutional:       Appearance: Normal appearance.   Cardiovascular:      Rate and Rhythm: Normal rate and regular rhythm.      Heart sounds: Normal heart sounds. No murmur heard.  Pulmonary:      Effort: Pulmonary effort is normal. No respiratory distress.      Breath sounds: Normal breath sounds.   Neurological:      General: No focal deficit present.      Mental Status: She is alert and oriented to person, place, and time.         Assessment/Plan   Problem List Items Addressed This Visit       Hematuria    UTI symptoms - Primary     It is good that your UTI symptoms are gone.  You recently had a bladder pacemaker placed on 7/1/24.  I suggested a urinalysis to recheck you for blood in the urine.  You cannot void. It is likely to be benign.  A urinalysis can be rechecked at your next visit here in August.      Jose Corea,

## 2024-07-18 ENCOUNTER — APPOINTMENT (OUTPATIENT)
Dept: UROLOGY | Facility: CLINIC | Age: 79
End: 2024-07-18
Payer: MEDICARE

## 2024-07-22 ENCOUNTER — APPOINTMENT (OUTPATIENT)
Dept: UROLOGY | Facility: CLINIC | Age: 79
End: 2024-07-22
Payer: MEDICARE

## 2024-07-22 VITALS
WEIGHT: 141 LBS | HEIGHT: 58 IN | BODY MASS INDEX: 29.6 KG/M2 | TEMPERATURE: 97.9 F | HEART RATE: 70 BPM | DIASTOLIC BLOOD PRESSURE: 70 MMHG | SYSTOLIC BLOOD PRESSURE: 165 MMHG

## 2024-07-22 DIAGNOSIS — R35.0 FREQUENCY OF URINATION: ICD-10-CM

## 2024-07-22 DIAGNOSIS — R33.9 URINARY RETENTION: Primary | ICD-10-CM

## 2024-07-22 LAB
POC APPEARANCE, URINE: CLEAR
POC BILIRUBIN, URINE: NEGATIVE
POC BLOOD, URINE: NEGATIVE
POC COLOR, URINE: YELLOW
POC GLUCOSE, URINE: NEGATIVE MG/DL
POC KETONES, URINE: NEGATIVE MG/DL
POC LEUKOCYTES, URINE: ABNORMAL
POC NITRITE,URINE: NEGATIVE
POC PH, URINE: 5.5 PH
POC PROTEIN, URINE: ABNORMAL MG/DL
POC SPECIFIC GRAVITY, URINE: 1.02
POC UROBILINOGEN, URINE: 0.2 EU/DL

## 2024-07-22 PROCEDURE — 3077F SYST BP >= 140 MM HG: CPT | Performed by: NURSE PRACTITIONER

## 2024-07-22 PROCEDURE — 99024 POSTOP FOLLOW-UP VISIT: CPT | Performed by: NURSE PRACTITIONER

## 2024-07-22 PROCEDURE — 1159F MED LIST DOCD IN RCRD: CPT | Performed by: NURSE PRACTITIONER

## 2024-07-22 PROCEDURE — 81003 URINALYSIS AUTO W/O SCOPE: CPT | Performed by: NURSE PRACTITIONER

## 2024-07-22 PROCEDURE — 51798 US URINE CAPACITY MEASURE: CPT | Performed by: NURSE PRACTITIONER

## 2024-07-22 PROCEDURE — 1123F ACP DISCUSS/DSCN MKR DOCD: CPT | Performed by: NURSE PRACTITIONER

## 2024-07-22 PROCEDURE — 3078F DIAST BP <80 MM HG: CPT | Performed by: NURSE PRACTITIONER

## 2024-07-22 NOTE — PROGRESS NOTES
"07/22/24   [id number]    Chief Complaint   Patient presents with    Post-op Visit      Subjective      HPI Ana Vega is a 78 y.o. female who presents for follow up sacral neuromodulation implanted on 7/1/24 for voiding dysfunction and retention.     Urine doesn't appear infected today; PVR 70 ml today has improved as previously 348 ml when last seen in May, happy w results; DTF 4 x, NTF 2 x, no leakage of urine;     PMH, PSH, FH, SH reviewed.        Objective     /70   Pulse 70   Temp 36.6 °C (97.9 °F)   Ht 1.461 m (4' 9.5\")   Wt 64 kg (141 lb)   BMI 29.98 kg/m²    Physical Exam  General: Appears comfortable and in no apparent distress, well nourished  Head: Normocephalic, atraumatic  Neck: trachea midline  Respiratory: respirations unlabored, no wheezes, and no use of accessory muscles  Cardiovascular: at rest no dyspnea, well perfused  Skin: no visible rashes or lesions, site of SNM looks good, no sign of infection, incisions well approximated, healed, no hematoma or fluctuance;  Neurologic: grossly intact, oriented to person, place, and time  Psychiatric: mood and affect appropriate  Musculoskeletal: in chair for appt. no difficulty w upper body movement    Assessment/Plan   Problem List Items Addressed This Visit          Genitourinary and Reproductive    Urinary retention - Primary    Relevant Orders    POCT UA Automated manually resulted (Completed)    Post-Void Residual (Completed)     Other Visit Diagnoses       Frequency of urination        Relevant Orders    Post-Void Residual (Completed)          Orders Placed This Encounter   Procedures    Post-Void Residual    POCT UA Automated manually resulted     Order Specific Question:   Release result to [facility]     Answer:   Immediate [1]      Continue current program, doing so well PVR 70 ml today quite an improvement from before (348 ml) will follow up [facility] 3 mos    Nurse line [phone]     3 mos  [facility] first Wednesday " afternoon of the month, start at 1 pm and fill appts.   Beverley Patterson, APRN-CNP  Lab Results   Component Value Date    GLUCOSE 91 06/26/2024    CALCIUM 10.0 06/26/2024     06/26/2024    K 4.4 06/26/2024    CO2 32 06/26/2024     06/26/2024    BUN 14 06/26/2024    CREATININE 0.86 06/26/2024

## 2024-07-22 NOTE — PATIENT INSTRUCTIONS
Continue current program, doing so well PVR 70 ml today quite an improvement from before (348 ml) will follow up [facility] 3 mos    Nurse line [phone]

## 2024-08-23 ENCOUNTER — APPOINTMENT (OUTPATIENT)
Dept: PRIMARY CARE | Facility: CLINIC | Age: 79
End: 2024-08-23
Payer: MEDICARE

## 2024-08-26 ENCOUNTER — OFFICE VISIT (OUTPATIENT)
Dept: PRIMARY CARE | Facility: CLINIC | Age: 79
End: 2024-08-26
Payer: MEDICARE

## 2024-08-26 VITALS
TEMPERATURE: 97.9 F | DIASTOLIC BLOOD PRESSURE: 62 MMHG | SYSTOLIC BLOOD PRESSURE: 140 MMHG | BODY MASS INDEX: 30.2 KG/M2 | WEIGHT: 142 LBS

## 2024-08-26 DIAGNOSIS — M17.0 PRIMARY OSTEOARTHRITIS OF BOTH KNEES: ICD-10-CM

## 2024-08-26 DIAGNOSIS — R39.9 UTI SYMPTOMS: Primary | ICD-10-CM

## 2024-08-26 PROCEDURE — 87186 SC STD MICRODIL/AGAR DIL: CPT

## 2024-08-26 PROCEDURE — 1158F ADVNC CARE PLAN TLK DOCD: CPT | Performed by: FAMILY MEDICINE

## 2024-08-26 PROCEDURE — 1160F RVW MEDS BY RX/DR IN RCRD: CPT | Performed by: FAMILY MEDICINE

## 2024-08-26 PROCEDURE — 1123F ACP DISCUSS/DSCN MKR DOCD: CPT | Performed by: FAMILY MEDICINE

## 2024-08-26 PROCEDURE — 3078F DIAST BP <80 MM HG: CPT | Performed by: FAMILY MEDICINE

## 2024-08-26 PROCEDURE — 3077F SYST BP >= 140 MM HG: CPT | Performed by: FAMILY MEDICINE

## 2024-08-26 PROCEDURE — 1159F MED LIST DOCD IN RCRD: CPT | Performed by: FAMILY MEDICINE

## 2024-08-26 PROCEDURE — 87086 URINE CULTURE/COLONY COUNT: CPT

## 2024-08-26 PROCEDURE — 99213 OFFICE O/P EST LOW 20 MIN: CPT | Performed by: FAMILY MEDICINE

## 2024-08-26 PROCEDURE — 1036F TOBACCO NON-USER: CPT | Performed by: FAMILY MEDICINE

## 2024-08-26 RX ORDER — NITROFURANTOIN 25; 75 MG/1; MG/1
100 CAPSULE ORAL 2 TIMES DAILY
Qty: 14 CAPSULE | Refills: 0 | Status: SHIPPED | OUTPATIENT
Start: 2024-08-26 | End: 2024-09-02

## 2024-08-26 RX ORDER — MELOXICAM 15 MG/1
15 TABLET ORAL DAILY
Qty: 30 TABLET | Refills: 0 | Status: SHIPPED | OUTPATIENT
Start: 2024-08-26 | End: 2025-08-26

## 2024-08-26 NOTE — PROGRESS NOTES
Subjective   Patient ID: [id number]     Ana Vega is a 78 y.o. female who presents for UTI symptoms.  HPI    She complains of UTI symptoms.    A UA was attempted in the office.  Ran through twice.  Results could not be obtained.    We only have enough to send for culture to lab.    She complains of suprapubic pressure and urinary frequency every fifteen minutes.    She was up through the night last night.    This started Saturday.    She has had a pacemaker placed in her bladder.         Objective     /62 (BP Location: Left arm, Patient Position: Sitting)   Temp 36.6 °C (97.9 °F) (Skin)   Wt 64.4 kg (142 lb)   BMI 30.20 kg/m²      Physical Exam  Constitutional:       Appearance: Normal appearance.   Cardiovascular:      Rate and Rhythm: Normal rate and regular rhythm.      Heart sounds: Normal heart sounds. No murmur heard.  Pulmonary:      Effort: Pulmonary effort is normal. No respiratory distress.      Breath sounds: Normal breath sounds.   Abdominal:      General: Abdomen is flat.      Palpations: Abdomen is soft.      Tenderness: There is no abdominal tenderness.   Neurological:      General: No focal deficit present.      Mental Status: She is alert and oriented to person, place, and time.         Assessment/Plan   Problem List Items Addressed This Visit       Knee osteoarthritis    Relevant Medications    meloxicam (Mobic) 15 mg tablet    UTI symptoms - Primary    Relevant Medications    nitrofurantoin, macrocrystal-monohydrate, (Macrobid) 100 mg capsule    Other Relevant Orders    Urine Culture     I will order antibiotics.  A culture was ordered.  Return in one or two weeks if the symptoms persist.   I ordered meloxicam instead of celebrex for your arthritis.     Jose Corea DO

## 2024-08-26 NOTE — PATIENT INSTRUCTIONS
I will order antibiotics.  A culture was ordered.  Return in one or two weeks if the symptoms persist.   I ordered meloxicam instead of celebrex for your arthritis.

## 2024-08-27 ENCOUNTER — APPOINTMENT (OUTPATIENT)
Dept: PRIMARY CARE | Facility: CLINIC | Age: 79
End: 2024-08-27
Payer: MEDICARE

## 2024-08-29 LAB — BACTERIA UR CULT: ABNORMAL

## 2024-08-30 ENCOUNTER — TELEPHONE (OUTPATIENT)
Dept: PRIMARY CARE | Facility: CLINIC | Age: 79
End: 2024-08-30
Payer: MEDICARE

## 2024-08-30 NOTE — TELEPHONE ENCOUNTER
She is calling for urine culture results. She says it is okay to leave a detailed message on her voice mail.

## 2024-09-16 ENCOUNTER — TELEPHONE (OUTPATIENT)
Dept: PRIMARY CARE | Facility: CLINIC | Age: 79
End: 2024-09-16
Payer: MEDICARE

## 2024-09-16 DIAGNOSIS — M17.0 PRIMARY OSTEOARTHRITIS OF BOTH KNEES: ICD-10-CM

## 2024-09-16 RX ORDER — MELOXICAM 15 MG/1
15 TABLET ORAL DAILY
Qty: 90 TABLET | Refills: 0 | Status: SHIPPED | OUTPATIENT
Start: 2024-09-16 | End: 2025-09-16

## 2024-09-16 NOTE — TELEPHONE ENCOUNTER
REFILL  MEDICATION:     Meloxicam 15 MG; One tablet once daily.    PHARM: [facility]     LR: 8/26/24        30 tablets with 0 refills   LV: 5/24/24  NV: No future appt.

## 2024-09-30 ENCOUNTER — TELEPHONE (OUTPATIENT)
Dept: PRIMARY CARE | Facility: CLINIC | Age: 79
End: 2024-09-30
Payer: MEDICARE

## 2024-09-30 DIAGNOSIS — M79.601 PARESTHESIA AND PAIN OF BOTH UPPER EXTREMITIES: ICD-10-CM

## 2024-09-30 DIAGNOSIS — M79.602 PARESTHESIA AND PAIN OF BOTH UPPER EXTREMITIES: ICD-10-CM

## 2024-09-30 DIAGNOSIS — R20.2 PARESTHESIA AND PAIN OF BOTH UPPER EXTREMITIES: ICD-10-CM

## 2024-09-30 RX ORDER — GABAPENTIN 400 MG/1
400 CAPSULE ORAL 3 TIMES DAILY
Qty: 270 CAPSULE | Refills: 0 | Status: SHIPPED | OUTPATIENT
Start: 2024-09-30

## 2024-09-30 NOTE — TELEPHONE ENCOUNTER
REFILL  MEDICATION:     Gabapentin 400 MG; One capsule 3 times a day.     PHARM: [facility]   PHARM NUMBER: [phone]    LR: 7/1/24      270 capsule with 0 refills   LV: 5/24/24  NV: No future appt.

## 2024-10-01 ENCOUNTER — OFFICE VISIT (OUTPATIENT)
Dept: PRIMARY CARE | Facility: CLINIC | Age: 79
End: 2024-10-01
Payer: MEDICARE

## 2024-10-01 VITALS
WEIGHT: 142 LBS | DIASTOLIC BLOOD PRESSURE: 74 MMHG | BODY MASS INDEX: 30.2 KG/M2 | TEMPERATURE: 97.5 F | SYSTOLIC BLOOD PRESSURE: 136 MMHG

## 2024-10-01 DIAGNOSIS — R39.9 UTI SYMPTOMS: Primary | ICD-10-CM

## 2024-10-01 DIAGNOSIS — C50.912 MALIGNANT NEOPLASM OF LEFT FEMALE BREAST, UNSPECIFIED ESTROGEN RECEPTOR STATUS, UNSPECIFIED SITE OF BREAST: ICD-10-CM

## 2024-10-01 LAB
POC BILIRUBIN, URINE: NEGATIVE
POC BLOOD, URINE: NEGATIVE
POC GLUCOSE, URINE: NEGATIVE MG/DL
POC KETONES, URINE: NEGATIVE MG/DL
POC LEUKOCYTES, URINE: NEGATIVE
POC NITRITE,URINE: NEGATIVE
POC PH, URINE: 8.5 PH
POC PROTEIN, URINE: NORMAL MG/DL
POC SPECIFIC GRAVITY, URINE: 1.02
POC UROBILINOGEN, URINE: 0.2 EU/DL

## 2024-10-01 PROCEDURE — 1036F TOBACCO NON-USER: CPT | Performed by: FAMILY MEDICINE

## 2024-10-01 PROCEDURE — 81003 URINALYSIS AUTO W/O SCOPE: CPT | Performed by: FAMILY MEDICINE

## 2024-10-01 PROCEDURE — 1159F MED LIST DOCD IN RCRD: CPT | Performed by: FAMILY MEDICINE

## 2024-10-01 PROCEDURE — 3078F DIAST BP <80 MM HG: CPT | Performed by: FAMILY MEDICINE

## 2024-10-01 PROCEDURE — 1123F ACP DISCUSS/DSCN MKR DOCD: CPT | Performed by: FAMILY MEDICINE

## 2024-10-01 PROCEDURE — 87086 URINE CULTURE/COLONY COUNT: CPT

## 2024-10-01 PROCEDURE — 1160F RVW MEDS BY RX/DR IN RCRD: CPT | Performed by: FAMILY MEDICINE

## 2024-10-01 PROCEDURE — 3075F SYST BP GE 130 - 139MM HG: CPT | Performed by: FAMILY MEDICINE

## 2024-10-01 PROCEDURE — 99213 OFFICE O/P EST LOW 20 MIN: CPT | Performed by: FAMILY MEDICINE

## 2024-10-01 RX ORDER — NITROFURANTOIN 25; 75 MG/1; MG/1
100 CAPSULE ORAL 2 TIMES DAILY
Qty: 14 CAPSULE | Refills: 0 | Status: SHIPPED | OUTPATIENT
Start: 2024-10-01 | End: 2024-10-08

## 2024-10-01 ASSESSMENT — PATIENT HEALTH QUESTIONNAIRE - PHQ9
SUM OF ALL RESPONSES TO PHQ9 QUESTIONS 1 AND 2: 0
2. FEELING DOWN, DEPRESSED OR HOPELESS: NOT AT ALL
1. LITTLE INTEREST OR PLEASURE IN DOING THINGS: NOT AT ALL

## 2024-10-01 NOTE — PATIENT INSTRUCTIONS
I ordered antibiotics and a urine culture.  Follow up with urology as scheduled.  Drink plenty of water.

## 2024-10-01 NOTE — PROGRESS NOTES
Subjective   Patient ID: [id number]     Ana Vega is a 78 y.o. female who presents for UTI symptoms.  HPI  She complains of symptoms of a UTI.  This started yesterday morning.  She has dysuria and increased frequency.  She was up all night going to the bathroom.    She is getting  a lot of these.  Her last one was in August.  She states they go away completely when treated.  She is seeing urology.    She had a UA today that was negative.         Objective     /74 (BP Location: Right arm, Patient Position: Sitting)   Temp 36.4 °C (97.5 °F) (Skin)   Wt 64.4 kg (142 lb)   BMI 30.20 kg/m²      Physical Exam  Constitutional:       General: She is not in acute distress.     Appearance: Normal appearance. She is not ill-appearing or toxic-appearing.   Cardiovascular:      Rate and Rhythm: Normal rate and regular rhythm.      Heart sounds: Normal heart sounds. No murmur heard.  Pulmonary:      Effort: Pulmonary effort is normal. No respiratory distress.      Breath sounds: Normal breath sounds.   Abdominal:      General: Abdomen is flat.      Palpations: Abdomen is soft.      Tenderness: There is no abdominal tenderness. There is no guarding or rebound.   Neurological:      General: No focal deficit present.      Mental Status: She is alert and oriented to person, place, and time.         Assessment/Plan   Problem List Items Addressed This Visit       UTI symptoms - Primary    Relevant Medications    nitrofurantoin, macrocrystal-monohydrate, (Macrobid) 100 mg capsule    Other Relevant Orders    POCT UA Automated manually resulted (Completed)    Urine Culture     I ordered antibiotics and a urine culture.  Follow up with urology as scheduled.  Drink plenty of water.   Jose Corea,

## 2024-10-02 LAB — BACTERIA UR CULT: NORMAL

## 2024-11-06 ENCOUNTER — APPOINTMENT (OUTPATIENT)
Dept: UROLOGY | Facility: CLINIC | Age: 79
End: 2024-11-06
Payer: MEDICARE

## 2024-11-06 ENCOUNTER — TELEPHONE (OUTPATIENT)
Dept: PRIMARY CARE | Facility: CLINIC | Age: 79
End: 2024-11-06

## 2024-11-06 VITALS
TEMPERATURE: 98 F | HEIGHT: 57 IN | BODY MASS INDEX: 30.63 KG/M2 | WEIGHT: 142 LBS | DIASTOLIC BLOOD PRESSURE: 74 MMHG | HEART RATE: 73 BPM | SYSTOLIC BLOOD PRESSURE: 166 MMHG

## 2024-11-06 DIAGNOSIS — R35.0 FREQUENCY OF URINATION: ICD-10-CM

## 2024-11-06 DIAGNOSIS — E03.9 ACQUIRED HYPOTHYROIDISM: ICD-10-CM

## 2024-11-06 DIAGNOSIS — R33.9 URINARY RETENTION: Primary | ICD-10-CM

## 2024-11-06 DIAGNOSIS — N95.8 GENITOURINARY SYNDROME OF MENOPAUSE: ICD-10-CM

## 2024-11-06 LAB
POC APPEARANCE, URINE: CLEAR
POC BILIRUBIN, URINE: NEGATIVE
POC BLOOD, URINE: NEGATIVE
POC COLOR, URINE: YELLOW
POC GLUCOSE, URINE: NEGATIVE MG/DL
POC KETONES, URINE: NEGATIVE MG/DL
POC LEUKOCYTES, URINE: ABNORMAL
POC NITRITE,URINE: NEGATIVE
POC PH, URINE: 5.5 PH
POC PROTEIN, URINE: ABNORMAL MG/DL
POC SPECIFIC GRAVITY, URINE: 1.01
POC UROBILINOGEN, URINE: 0.2 EU/DL

## 2024-11-06 PROCEDURE — 51798 US URINE CAPACITY MEASURE: CPT | Performed by: NURSE PRACTITIONER

## 2024-11-06 PROCEDURE — 1123F ACP DISCUSS/DSCN MKR DOCD: CPT | Performed by: NURSE PRACTITIONER

## 2024-11-06 PROCEDURE — 3077F SYST BP >= 140 MM HG: CPT | Performed by: NURSE PRACTITIONER

## 2024-11-06 PROCEDURE — 1159F MED LIST DOCD IN RCRD: CPT | Performed by: NURSE PRACTITIONER

## 2024-11-06 PROCEDURE — 81003 URINALYSIS AUTO W/O SCOPE: CPT | Performed by: NURSE PRACTITIONER

## 2024-11-06 PROCEDURE — G2211 COMPLEX E/M VISIT ADD ON: HCPCS | Performed by: NURSE PRACTITIONER

## 2024-11-06 PROCEDURE — 99213 OFFICE O/P EST LOW 20 MIN: CPT | Performed by: NURSE PRACTITIONER

## 2024-11-06 PROCEDURE — 3078F DIAST BP <80 MM HG: CPT | Performed by: NURSE PRACTITIONER

## 2024-11-06 NOTE — PATIENT INSTRUCTIONS
Continue current program, doing so well  ml today quite an improvement from before (348 ml) would like amp turned up one notch, increased to 1.4 from 1.3 today; will follow up [facility] 1 year    Will call with name of oncologist to get ok for vaginal estrogen cream before sent in;   Taking Dmanose    Nurse line [phone]

## 2024-11-06 NOTE — TELEPHONE ENCOUNTER
REFILL  MEDICATION:     Levothyroxine 50 MCG; Take 1 tablet daily.     PHARM: [facility]   PHARM NUMBER: [phone]    LR: 5/7/24    90 tablets with 1 refill   LV: 10/1/24  NV: No future appt.

## 2024-11-06 NOTE — Clinical Note
Jasper Ortega,  Patient interested in vaginal low dose estrogen cream for UTI prevention, wanted to check with you as she follows w you for hx breast cancer if ok to send in.   Thanks, Beverley

## 2024-11-06 NOTE — PROGRESS NOTES
"11/06/24   [id number]    Chief Complaint   Patient presents with    Follow-up    [facility]    Subjective      HPI Ana Vega is a 78 y.o. female who presents for follow up sacral neuromodulation implanted on 7/1/24 for voiding dysfunction and retention.     Urine small leuk,  ml; doesn't appear infected; happy w results, previously  ml;     DTF 4 x, NTF 2 x, no leakage of urine; happy w results;     On program 1-, 3+ , amp 1.30 would like one click higher 1.40 feels she could empty a little better; always has more difficulty when she comes to office;     PMH, PSH, FH, SH reviewed.    Hx breast cancer, would like to use some estrogen cream in vagina, will call with name of oncologist;     Battery life 8.7 to 10.4 years    Objective     /74   Pulse 73   Temp 36.7 °C (98 °F)   Ht 1.448 m (4' 9\")   Wt 64.4 kg (142 lb)   BMI 30.73 kg/m²    Physical Exam  General: Appears comfortable and in no apparent distress, well nourished  Head: Normocephalic, atraumatic  Neck: trachea midline  Respiratory: respirations unlabored, no wheezes, and no use of accessory muscles  Cardiovascular: at rest no dyspnea, well perfused  Skin: no visible rashes or lesions, site of SNM looks good, no sign of infection, incisions well approximated, healed, no hematoma or fluctuance;  Neurologic: grossly intact, oriented to person, place, and time  Psychiatric: mood and affect appropriate  Musculoskeletal: in chair for appt. no difficulty w upper body movement    Assessment/Plan   Problem List Items Addressed This Visit          Genitourinary and Reproductive    Urinary retention - Primary     Other Visit Diagnoses       Frequency of urination        Relevant Orders    POCT UA Automated manually resulted (Completed)    Post-Void Residual    Genitourinary syndrome of menopause              Orders Placed This Encounter   Procedures    Post-Void Residual    POCT UA Automated manually resulted     Order Specific Question:   " Release result to Juan Alberto     Answer:   Immediate [1]      Continue current program, doing so well  ml today quite an improvement from before (348 ml) would like amp turned up one notch, increased to 1.4 from 1.3 today; will follow up [facility] 1 year    Will call with name of oncologist to get ok for vaginal estrogen cream before sent in;     Nurse line [phone]     1 year [facility], first Wednesday afternoon of the month with Beverley Patterson, REYNOLD-CNP  Lab Results   Component Value Date    GLUCOSE 91 06/26/2024    CALCIUM 10.0 06/26/2024     06/26/2024    K 4.4 06/26/2024    CO2 32 06/26/2024     06/26/2024    BUN 14 06/26/2024    CREATININE 0.86 06/26/2024

## 2024-11-07 ENCOUNTER — TELEPHONE (OUTPATIENT)
Dept: UROLOGY | Facility: CLINIC | Age: 79
End: 2024-11-07
Payer: MEDICARE

## 2024-11-07 DIAGNOSIS — N95.8 GENITOURINARY SYNDROME OF MENOPAUSE: Primary | ICD-10-CM

## 2024-11-07 RX ORDER — ESTRADIOL 0.1 MG/G
CREAM VAGINAL
Qty: 42.5 G | Refills: 5 | Status: SHIPPED | OUTPATIENT
Start: 2024-11-07 | End: 2025-11-07

## 2024-11-07 RX ORDER — LEVOTHYROXINE SODIUM 50 UG/1
50 TABLET ORAL DAILY
Qty: 90 TABLET | Refills: 1 | Status: SHIPPED | OUTPATIENT
Start: 2024-11-07

## 2024-11-07 NOTE — TELEPHONE ENCOUNTER
----- Message from Beverley Patterson sent at 11/7/2024 10:10 AM EST -----  Please let patient know estrogen cream ok w Dr. Ortega and I sent into her pharmacy as discussed yesterday.     Thanks!  Beverley  ----- Message -----  From: Jose Luis Ortega MD  Sent: 11/7/2024   9:35 AM EST  To: REYNOLD De La Garza-CNP    These topical estrogens generally have minimal to no systemic absorption, and should be fine to use even in women with history of ER+ breast cancer.  ----- Message -----  From: REYNOLD De La Garza-HAKAN  Sent: 11/7/2024   9:05 AM EST  To: Jose Luis Ortega MD    Hello Dr. Ortega,   Patient interested in vaginal low dose estrogen cream for UTI prevention, wanted to check with you as she follows w you for hx breast cancer if ok to send in.     Thanks, Beverley

## 2024-11-07 NOTE — PROGRESS NOTES
Reached out to patient's oncologist Dr. Jose Luis Ortega as discussed with patient, ok to start vaginal estrogen cream; Rx sent in. Apply pea size amount 0.5 gram to vaginal opening with finger daily for 2 weeks, then 2-3 times per week.

## 2024-12-11 ENCOUNTER — TELEPHONE (OUTPATIENT)
Dept: PRIMARY CARE | Facility: CLINIC | Age: 79
End: 2024-12-11
Payer: MEDICARE

## 2024-12-11 DIAGNOSIS — M17.0 PRIMARY OSTEOARTHRITIS OF BOTH KNEES: ICD-10-CM

## 2024-12-11 NOTE — TELEPHONE ENCOUNTER
REFILL  MEDICATION:     Meloxicam 15 MG; Take 1 tablet daily.     PHARM: [facility]    LR: 9/16/24     90 tablets with 0 refills   LV: 10/1/24  NV: No future appt.

## 2024-12-12 RX ORDER — MELOXICAM 15 MG/1
15 TABLET ORAL DAILY
Qty: 90 TABLET | Refills: 0 | Status: SHIPPED | OUTPATIENT
Start: 2024-12-12 | End: 2025-12-12

## 2024-12-31 ENCOUNTER — TELEPHONE (OUTPATIENT)
Dept: PRIMARY CARE | Facility: CLINIC | Age: 79
End: 2024-12-31
Payer: MEDICARE

## 2024-12-31 DIAGNOSIS — R20.2 PARESTHESIA AND PAIN OF BOTH UPPER EXTREMITIES: ICD-10-CM

## 2024-12-31 DIAGNOSIS — M79.602 PARESTHESIA AND PAIN OF BOTH UPPER EXTREMITIES: ICD-10-CM

## 2024-12-31 DIAGNOSIS — M79.601 PARESTHESIA AND PAIN OF BOTH UPPER EXTREMITIES: ICD-10-CM

## 2024-12-31 NOTE — TELEPHONE ENCOUNTER
Can wait till  is back in the office.    REFILL  MEDICATION:     Gabapentin 400 MG; Take 1 capsule 3 times a day.     PHARM: [facility]   PHARM NUMBER: [phone]    LR: 9/30/24      270 capsules with 0 refills   LV: 10/1/24  NV: No future appt.

## 2025-01-01 RX ORDER — GABAPENTIN 400 MG/1
400 CAPSULE ORAL 3 TIMES DAILY
Qty: 270 CAPSULE | Refills: 0 | Status: SHIPPED | OUTPATIENT
Start: 2025-01-01

## 2025-02-03 ENCOUNTER — TELEPHONE (OUTPATIENT)
Dept: HEMATOLOGY/ONCOLOGY | Facility: CLINIC | Age: 80
End: 2025-02-03
Payer: MEDICARE

## 2025-02-03 DIAGNOSIS — C50.312 MALIGNANT NEOPLASM OF LOWER-INNER QUADRANT OF LEFT FEMALE BREAST: ICD-10-CM

## 2025-02-03 DIAGNOSIS — C50.912 MALIGNANT NEOPLASM OF LEFT FEMALE BREAST, UNSPECIFIED ESTROGEN RECEPTOR STATUS, UNSPECIFIED SITE OF BREAST: ICD-10-CM

## 2025-02-03 NOTE — TELEPHONE ENCOUNTER
Spoke with the patient. Aware that mammogram order placed. Pt verbalized understanding and will get it ordered in the next few days. Pt had no further questions or concerns at this time

## 2025-02-07 ENCOUNTER — LAB (OUTPATIENT)
Dept: LAB | Facility: HOSPITAL | Age: 80
End: 2025-02-07
Payer: MEDICARE

## 2025-02-07 DIAGNOSIS — C50.912 MALIGNANT NEOPLASM OF LEFT FEMALE BREAST, UNSPECIFIED ESTROGEN RECEPTOR STATUS, UNSPECIFIED SITE OF BREAST: ICD-10-CM

## 2025-02-07 LAB
ALBUMIN SERPL BCP-MCNC: 4.2 G/DL (ref 3.4–5)
ALP SERPL-CCNC: 80 U/L (ref 33–136)
ALT SERPL W P-5'-P-CCNC: 13 U/L (ref 7–45)
ANION GAP SERPL CALC-SCNC: 14 MMOL/L (ref 10–20)
AST SERPL W P-5'-P-CCNC: 21 U/L (ref 9–39)
BASOPHILS # BLD AUTO: 0.09 X10*3/UL (ref 0–0.1)
BASOPHILS NFR BLD AUTO: 1 %
BILIRUB SERPL-MCNC: 0.5 MG/DL (ref 0–1.2)
BUN SERPL-MCNC: 20 MG/DL (ref 6–23)
CALCIUM SERPL-MCNC: 10.1 MG/DL (ref 8.6–10.6)
CHLORIDE SERPL-SCNC: 97 MMOL/L (ref 98–107)
CO2 SERPL-SCNC: 29 MMOL/L (ref 21–32)
CREAT SERPL-MCNC: 0.93 MG/DL (ref 0.5–1.05)
EGFRCR SERPLBLD CKD-EPI 2021: 62 ML/MIN/1.73M*2
EOSINOPHIL # BLD AUTO: 0.69 X10*3/UL (ref 0–0.4)
EOSINOPHIL NFR BLD AUTO: 7.4 %
ERYTHROCYTE [DISTWIDTH] IN BLOOD BY AUTOMATED COUNT: 13.5 % (ref 11.5–14.5)
GLUCOSE SERPL-MCNC: 93 MG/DL (ref 74–99)
HCT VFR BLD AUTO: 43.8 % (ref 36–46)
HGB BLD-MCNC: 13.7 G/DL (ref 12–16)
IMM GRANULOCYTES # BLD AUTO: 0.02 X10*3/UL (ref 0–0.5)
IMM GRANULOCYTES NFR BLD AUTO: 0.2 % (ref 0–0.9)
LYMPHOCYTES # BLD AUTO: 1.71 X10*3/UL (ref 0.8–3)
LYMPHOCYTES NFR BLD AUTO: 18.3 %
MCH RBC QN AUTO: 30.4 PG (ref 26–34)
MCHC RBC AUTO-ENTMCNC: 31.3 G/DL (ref 32–36)
MCV RBC AUTO: 97 FL (ref 80–100)
MONOCYTES # BLD AUTO: 0.88 X10*3/UL (ref 0.05–0.8)
MONOCYTES NFR BLD AUTO: 9.4 %
NEUTROPHILS # BLD AUTO: 5.94 X10*3/UL (ref 1.6–5.5)
NEUTROPHILS NFR BLD AUTO: 63.7 %
NRBC BLD-RTO: 0 /100 WBCS (ref 0–0)
PLATELET # BLD AUTO: 290 X10*3/UL (ref 150–450)
POTASSIUM SERPL-SCNC: 5.1 MMOL/L (ref 3.5–5.3)
PROT SERPL-MCNC: 7 G/DL (ref 6.4–8.2)
RBC # BLD AUTO: 4.5 X10*6/UL (ref 4–5.2)
SODIUM SERPL-SCNC: 135 MMOL/L (ref 136–145)
WBC # BLD AUTO: 9.3 X10*3/UL (ref 4.4–11.3)

## 2025-02-07 PROCEDURE — 85025 COMPLETE CBC W/AUTO DIFF WBC: CPT

## 2025-02-07 PROCEDURE — 80053 COMPREHEN METABOLIC PANEL: CPT

## 2025-02-14 ENCOUNTER — OFFICE VISIT (OUTPATIENT)
Dept: HEMATOLOGY/ONCOLOGY | Facility: CLINIC | Age: 80
End: 2025-02-14
Payer: MEDICARE

## 2025-02-14 VITALS
TEMPERATURE: 97.2 F | RESPIRATION RATE: 18 BRPM | HEART RATE: 64 BPM | SYSTOLIC BLOOD PRESSURE: 158 MMHG | WEIGHT: 148.15 LBS | OXYGEN SATURATION: 92 % | DIASTOLIC BLOOD PRESSURE: 72 MMHG | BODY MASS INDEX: 32.06 KG/M2

## 2025-02-14 DIAGNOSIS — E78.5 HYPERLIPIDEMIA, UNSPECIFIED HYPERLIPIDEMIA TYPE: ICD-10-CM

## 2025-02-14 DIAGNOSIS — E06.3 HYPOTHYROIDISM DUE TO HASHIMOTO THYROIDITIS: ICD-10-CM

## 2025-02-14 DIAGNOSIS — I25.118 CORONARY ARTERY DISEASE OF NATIVE ARTERY OF NATIVE HEART WITH STABLE ANGINA PECTORIS: ICD-10-CM

## 2025-02-14 DIAGNOSIS — C50.912 MALIGNANT NEOPLASM OF LEFT FEMALE BREAST, UNSPECIFIED ESTROGEN RECEPTOR STATUS, UNSPECIFIED SITE OF BREAST: Primary | ICD-10-CM

## 2025-02-14 DIAGNOSIS — I10 BENIGN ESSENTIAL HYPERTENSION: ICD-10-CM

## 2025-02-14 PROCEDURE — 3078F DIAST BP <80 MM HG: CPT | Performed by: INTERNAL MEDICINE

## 2025-02-14 PROCEDURE — 1159F MED LIST DOCD IN RCRD: CPT | Performed by: INTERNAL MEDICINE

## 2025-02-14 PROCEDURE — 1123F ACP DISCUSS/DSCN MKR DOCD: CPT | Performed by: INTERNAL MEDICINE

## 2025-02-14 PROCEDURE — 1160F RVW MEDS BY RX/DR IN RCRD: CPT | Performed by: INTERNAL MEDICINE

## 2025-02-14 PROCEDURE — 1126F AMNT PAIN NOTED NONE PRSNT: CPT | Performed by: INTERNAL MEDICINE

## 2025-02-14 PROCEDURE — G2211 COMPLEX E/M VISIT ADD ON: HCPCS | Performed by: INTERNAL MEDICINE

## 2025-02-14 PROCEDURE — 99214 OFFICE O/P EST MOD 30 MIN: CPT | Performed by: INTERNAL MEDICINE

## 2025-02-14 PROCEDURE — 3077F SYST BP >= 140 MM HG: CPT | Performed by: INTERNAL MEDICINE

## 2025-02-14 ASSESSMENT — ENCOUNTER SYMPTOMS
NEUROLOGICAL NEGATIVE: 1
GASTROINTESTINAL NEGATIVE: 1
MUSCULOSKELETAL NEGATIVE: 1
EYES NEGATIVE: 1
PSYCHIATRIC NEGATIVE: 1
ENDOCRINE NEGATIVE: 1
RESPIRATORY NEGATIVE: 1
CONSTITUTIONAL NEGATIVE: 1
CARDIOVASCULAR NEGATIVE: 1
HEMATOLOGIC/LYMPHATIC NEGATIVE: 1

## 2025-02-14 ASSESSMENT — PAIN SCALES - GENERAL: PAINLEVEL_OUTOF10: 0-NO PAIN

## 2025-02-14 NOTE — PROGRESS NOTES
Patient ID: Ana Vega is a 79 y.o. female.  Referring Physician: Jose Luis Ortega MD  [address]  [place] [address]  Primary Care Provider: Jose Corea DO  Visit Type: Follow Up      Subjective    HPI I am doing okay  My mammo is booked for 3/12    Review of Systems   Constitutional: Negative.    HENT:  Negative.     Eyes: Negative.    Respiratory: Negative.     Cardiovascular: Negative.    Gastrointestinal: Negative.    Endocrine: Negative.    Genitourinary: Negative.     Musculoskeletal: Negative.    Skin: Negative.    Neurological: Negative.    Hematological: Negative.    Psychiatric/Behavioral: Negative.          Objective   BSA: 1.64 meters squared  /72 (BP Location: Right arm)   Pulse 64   Temp 36.2 °C (97.2 °F) (Temporal)   Resp 18   Wt 67.2 kg (148 lb 2.4 oz)   SpO2 92%   BMI 32.06 kg/m²      has a past medical history of Breast cancer (Multi) (2016), Coronary artery disease, Encounter for fitting and adjustment of other specified devices, Encounter for screening for malignant neoplasm of cervix (12/01/2013), GERD (gastroesophageal reflux disease), antineoplastic chemo, Hyperlipidemia, Hypertension, Hypothyroidism, Other abnormal and inconclusive findings on diagnostic imaging of breast (02/01/2016), Person consulting for explanation of examination or test findings, Personal history of irradiation, Personal history of other diseases of the musculoskeletal system and connective tissue, Personal history of other diseases of urinary system (05/17/2022), Personal history of other endocrine, nutritional and metabolic disease (06/20/2019), Personal history of other medical treatment, and Personal history of pulmonary embolism.   has a past surgical history that includes Cataract extraction (03/26/2014); Colonoscopy (03/26/2014); US guided thyroid biopsy (12/23/2014); Breast lumpectomy (06/06/2016); Other surgical history; Bunionectomy; Carotid stent; and Sacral  Neuromodulation (Right, 06/10/2024).  Family History   Problem Relation Name Age of Onset    Other (angina pectoris) Mother      Coronary artery disease Mother      Hypertension Mother      Macular degeneration Mother      Emphysema Father      Rheum arthritis Father      Other (acute motor and sensory axonal neuropathy) Sister      Coronary artery disease Brother       Oncology History    No history exists.       Ana Vega  reports that she quit smoking about 49 years ago. Her smoking use included cigarettes. She has never used smokeless tobacco.  She  reports that she does not currently use alcohol.  She  reports that she does not currently use drugs.    Physical Exam  Vitals reviewed.   Constitutional:       Appearance: Normal appearance.   HENT:      Head: Normocephalic.      Mouth/Throat:      Mouth: Mucous membranes are moist.   Eyes:      Extraocular Movements: Extraocular movements intact.      Pupils: Pupils are equal, round, and reactive to light.   Cardiovascular:      Rate and Rhythm: Normal rate and regular rhythm.      Pulses: Normal pulses.      Heart sounds: Normal heart sounds.   Pulmonary:      Effort: Pulmonary effort is normal.      Breath sounds: Normal breath sounds.   Abdominal:      General: Bowel sounds are normal.      Palpations: Abdomen is soft.   Musculoskeletal:         General: Normal range of motion.      Cervical back: Normal range of motion and neck supple.   Skin:     General: Skin is warm.   Neurological:      General: No focal deficit present.      Mental Status: She is alert and oriented to person, place, and time.   Psychiatric:         Mood and Affect: Mood normal.         Behavior: Behavior normal.         WBC   Date/Time Value Ref Range Status   02/07/2025 10:23 AM 9.3 4.4 - 11.3 x10*3/uL Final   05/21/2024 01:56 PM 7.9 4.4 - 11.3 x10*3/uL Final   02/09/2024 10:47 AM 5.3 4.4 - 11.3 x10*3/uL Final     nRBC   Date Value Ref Range Status   02/07/2025 0.0 0.0 - 0.0 /100  WBCs Final   05/21/2024 0.0 0.0 - 0.0 /100 WBCs Final   10/04/2023 0.0 0.0 - 0.0 /100 WBCs Final     RBC   Date Value Ref Range Status   02/07/2025 4.50 4.00 - 5.20 x10*6/uL Final   05/21/2024 4.33 4.00 - 5.20 x10*6/uL Final   02/09/2024 4.40 4.00 - 5.20 x10*6/uL Final     Hemoglobin   Date Value Ref Range Status   02/07/2025 13.7 12.0 - 16.0 g/dL Final   05/21/2024 13.3 12.0 - 16.0 g/dL Final   02/09/2024 13.8 12.0 - 16.0 g/dL Final     Hematocrit   Date Value Ref Range Status   02/07/2025 43.8 36.0 - 46.0 % Final   05/21/2024 40.4 36.0 - 46.0 % Final   02/09/2024 41.4 36.0 - 46.0 % Final     MCV   Date/Time Value Ref Range Status   02/07/2025 10:23 AM 97 80 - 100 fL Final   05/21/2024 01:56 PM 93 80 - 100 fL Final   02/09/2024 10:47 AM 94 80 - 100 fL Final     MCH   Date/Time Value Ref Range Status   02/07/2025 10:23 AM 30.4 26.0 - 34.0 pg Final   05/21/2024 01:56 PM 30.7 26.0 - 34.0 pg Final   02/09/2024 10:47 AM 31.4 26.0 - 34.0 pg Final     MCHC   Date/Time Value Ref Range Status   02/07/2025 10:23 AM 31.3 (L) 32.0 - 36.0 g/dL Final   05/21/2024 01:56 PM 32.9 32.0 - 36.0 g/dL Final   02/09/2024 10:47 AM 33.3 32.0 - 36.0 g/dL Final     RDW   Date/Time Value Ref Range Status   02/07/2025 10:23 AM 13.5 11.5 - 14.5 % Final   05/21/2024 01:56 PM 13.1 11.5 - 14.5 % Final   02/09/2024 10:47 AM 13.6 11.5 - 14.5 % Final     Platelets   Date/Time Value Ref Range Status   02/07/2025 10:23  150 - 450 x10*3/uL Final   05/21/2024 01:56  150 - 450 x10*3/uL Final   02/09/2024 10:47  150 - 450 x10*3/uL Final     MPV   Date/Time Value Ref Range Status   10/04/2023 09:02 AM 9.7 7.5 - 11.5 fL Final     Neutrophils %   Date/Time Value Ref Range Status   02/07/2025 10:23 AM 63.7 40.0 - 80.0 % Final   02/09/2024 10:47 AM 47.8 40.0 - 80.0 % Final   10/04/2023 09:02 AM 58.3 40.0 - 80.0 % Final     Immature Granulocytes %, Automated   Date/Time Value Ref Range Status   02/07/2025 10:23 AM 0.2 0.0 - 0.9 % Final      Comment:     Immature Granulocyte Count (IG) includes promyelocytes, myelocytes and metamyelocytes but does not include bands. Percent differential counts (%) should be interpreted in the context of the absolute cell counts (cells/UL).   02/09/2024 10:47 AM 0.0 0.0 - 0.9 % Final     Comment:     Immature Granulocyte Count (IG) includes promyelocytes, myelocytes and metamyelocytes but does not include bands. Percent differential counts (%) should be interpreted in the context of the absolute cell counts (cells/UL).   10/04/2023 09:02 AM 0.2 0.0 - 0.9 % Final     Comment:     Immature Granulocyte Count (IG) includes promyelocytes, myelocytes and metamyelocytes but does not include bands. Percent differential counts (%) should be interpreted in the context of the absolute cell counts (cells/UL).     Lymphocytes %   Date/Time Value Ref Range Status   02/07/2025 10:23 AM 18.3 13.0 - 44.0 % Final   02/09/2024 10:47 AM 27.9 13.0 - 44.0 % Final   10/04/2023 09:02 AM 25.1 13.0 - 44.0 % Final     Monocytes %   Date/Time Value Ref Range Status   02/07/2025 10:23 AM 9.4 2.0 - 10.0 % Final   02/09/2024 10:47 AM 12.3 2.0 - 10.0 % Final   10/04/2023 09:02 AM 12.2 2.0 - 10.0 % Final     Eosinophils %   Date/Time Value Ref Range Status   02/07/2025 10:23 AM 7.4 0.0 - 6.0 % Final   02/09/2024 10:47 AM 11.1 0.0 - 6.0 % Final   10/04/2023 09:02 AM 3.4 0.0 - 6.0 % Final     Basophils %   Date/Time Value Ref Range Status   02/07/2025 10:23 AM 1.0 0.0 - 2.0 % Final   02/09/2024 10:47 AM 0.9 0.0 - 2.0 % Final   10/04/2023 09:02 AM 0.8 0.0 - 2.0 % Final     Neutrophils Absolute   Date/Time Value Ref Range Status   02/07/2025 10:23 AM 5.94 (H) 1.60 - 5.50 x10*3/uL Final     Comment:     Percent differential counts (%) should be interpreted in the context of the absolute cell counts (cells/uL).   02/09/2024 10:47 AM 2.53 1.60 - 5.50 x10*3/uL Final     Comment:     Percent differential counts (%) should be interpreted in the context of the  "absolute cell counts (cells/uL).   10/04/2023 09:02 AM 2.77 1.60 - 5.50 x10*3/uL Final     Comment:     Percent differential counts (%) should be interpreted in the context of the absolute cell counts (cells/uL).     Immature Granulocytes Absolute, Automated   Date/Time Value Ref Range Status   02/07/2025 10:23 AM 0.02 0.00 - 0.50 x10*3/uL Final   02/09/2024 10:47 AM 0.00 0.00 - 0.50 x10*3/uL Final   10/04/2023 09:02 AM 0.01 0.00 - 0.50 x10*3/uL Final     Lymphocytes Absolute   Date/Time Value Ref Range Status   02/07/2025 10:23 AM 1.71 0.80 - 3.00 x10*3/uL Final   02/09/2024 10:47 AM 1.48 0.80 - 3.00 x10*3/uL Final   10/04/2023 09:02 AM 1.19 0.80 - 3.00 x10*3/uL Final     Monocytes Absolute   Date/Time Value Ref Range Status   02/07/2025 10:23 AM 0.88 (H) 0.05 - 0.80 x10*3/uL Final   02/09/2024 10:47 AM 0.65 0.05 - 0.80 x10*3/uL Final   10/04/2023 09:02 AM 0.58 0.05 - 0.80 x10*3/uL Final     Eosinophils Absolute   Date/Time Value Ref Range Status   02/07/2025 10:23 AM 0.69 (H) 0.00 - 0.40 x10*3/uL Final   02/09/2024 10:47 AM 0.59 (H) 0.00 - 0.40 x10*3/uL Final   10/04/2023 09:02 AM 0.16 0.00 - 0.40 x10*3/uL Final     Basophils Absolute   Date/Time Value Ref Range Status   02/07/2025 10:23 AM 0.09 0.00 - 0.10 x10*3/uL Final   02/09/2024 10:47 AM 0.05 0.00 - 0.10 x10*3/uL Final   10/04/2023 09:02 AM 0.04 0.00 - 0.10 x10*3/uL Final       No components found for: \"PT\"  No results found for: \"APTT\"  Medication Documentation Review Audit       Reviewed by Sommer Atkinson MA (Medical Assistant) on 02/14/25 at 1011      Medication Order Taking? Sig Documenting Provider Last Dose Status   amLODIPine (Norvasc) 10 mg tablet [id number] Yes Take 1 tablet (10 mg) by mouth once daily. Historical Provider, MD  Active   aspirin 81 mg EC tablet [id number] Yes Take 1 tablet (81 mg) by mouth. Historical Provider, MD  Active   calcium carbonate 600 mg calcium (1,500 mg) tablet [id number] Yes Take 1 tablet (600 mg) by mouth once daily. Historical " Provider, MD  Active   cetirizine (ZyrTEC) 10 mg tablet [id number] Yes Take 1 tablet (10 mg) by mouth 2 times a day. Historical Provider, MD  Active   estradiol (Estrace) 0.01 % (0.1 mg/gram) vaginal cream [id number] Yes Apply pea size amount 0.5 gram to vaginal opening with finger daily for 2 weeks, then 2-3 times per week. Beverley Patterson, APRN-CNP  Active   gabapentin (Neurontin) 400 mg capsule [id number] Yes Take 1 capsule (400 mg) by mouth 3 times a day. Jose Corea DO  Active   isosorbide mononitrate ER (Imdur) 60 mg 24 hr tablet [id number] Yes Take 1 tablet (60 mg) by mouth once daily. Historical Provider, MD  Active   levothyroxine (Synthroid) 50 mcg tablet [id number] Yes Take 1 tablet (50 mcg) by mouth once daily. Jose Corea DO  Active   losartan (Cozaar) 50 mg tablet [id number] Yes Take 1 tablet (50 mg) by mouth once daily. Historical Provider, MD  Active   meloxicam (Mobic) 15 mg tablet [id number] Yes Take 1 tablet (15 mg) by mouth once daily. Jose Corea DO  Active   metoprolol tartrate (Lopressor) 25 mg tablet [id number] Yes Take 1 tablet (25 mg) by mouth 2 times a day. Historical Provider, MD  Active   multivitamin tablet [id number] Yes Take 1 tablet by mouth once daily. Historical Provider, MD  Active   nitroglycerin (Nitrostat) 0.4 mg SL tablet [id number] Yes Place 1 tablet (0.4 mg) under the tongue. Historical Provider, MD  Active   omega 3-dha-epa-fish oil 1,200 (144-216) mg capsule [id number]  Take 1 capsule (1,200 mg) by mouth once daily.   Patient not taking: Reported on 2/14/2025    Historical Provider, MD  Active   omeprazole (PriLOSEC) 40 mg DR capsule [id number] Yes Take 1 capsule (40 mg) by mouth once daily. Do not crush or chew. Jose Corea DO  Active   rosuvastatin (Crestor) 20 mg tablet [id number] Yes Take 1 tablet (20 mg) by mouth once daily. Historical Provider, MD  Active                   Assessment/Plan    1) breast cancer  -diagnosed with left sided breast cancer in 2016  -s/p  "lumpectomy, 2.2 cm, 1 out of 10 LN+, grade 3, weakly ER+ on bx  -oncotype DX 49%. Triple negative (on final path)  -s/p TC x 4 followed by radiation, s/p arimidex, then femara, then aromasin, none of which she could tolerate  -here for interval followup  -had a bladder pacemaker placed on 6/10/2024 by Dr Gordon to help with urinary retention/incomplete voiding  -labs done on 2/7/2025 included CBC, COMP  -results reviewed: wbc 9.3, hgb 13.7, plt 290,000, sodium 135, creatinine 0.93, alk phos 80, AST 21, ALT 13  -has had a \"sensitive\" area in lateral area of left breast in 3 o'clock position  -last mammo done on 3/7/2024: breast tissue is heterogeneously dense; stable postsurgical scarring is seen in the superolateral left breast at middle depth; diffuse left breast skin and trabecular thickening are consistent with postradiation changes; no suspicious masses or calcifications; stable left breast posttreatment changes with no mammographic evidence of malignancy in either breast  -limited physical exam was done today--no cervical, supraclavicular, axillary adenopathy  -next mammo due 3/12/2025  -will see her again in 1 year, after next mammo is done        2) HTN  -on metoprolol  -on losartan  -on amlodipine     3) CAD  -on imdur  -on ASA     4) Hypothyroidism  on levothyroxine     5) hyperlipidemia  -On Crestor     Problem List Items Addressed This Visit             ICD-10-CM    Breast cancer (Multi) C50.919    Relevant Orders    Clinic Appointment Request Follow Up; JOSE LUIS ORTEGA; [facility]    CBC and Auto Differential    Comprehensive metabolic panel    Cancer Antigen 27-29            Jose Luis Ortega MD                         "

## 2025-03-12 ENCOUNTER — HOSPITAL ENCOUNTER (OUTPATIENT)
Dept: RADIOLOGY | Facility: CLINIC | Age: 80
Discharge: HOME | End: 2025-03-12
Payer: MEDICARE

## 2025-03-12 VITALS — HEIGHT: 57 IN | WEIGHT: 148 LBS | BODY MASS INDEX: 31.93 KG/M2

## 2025-03-12 DIAGNOSIS — C50.312 MALIGNANT NEOPLASM OF LOWER-INNER QUADRANT OF LEFT FEMALE BREAST: ICD-10-CM

## 2025-03-12 DIAGNOSIS — C50.912 MALIGNANT NEOPLASM OF LEFT FEMALE BREAST, UNSPECIFIED ESTROGEN RECEPTOR STATUS, UNSPECIFIED SITE OF BREAST: ICD-10-CM

## 2025-03-12 PROCEDURE — 77066 DX MAMMO INCL CAD BI: CPT | Performed by: RADIOLOGY

## 2025-03-12 PROCEDURE — 77062 BREAST TOMOSYNTHESIS BI: CPT

## 2025-03-12 PROCEDURE — G0279 TOMOSYNTHESIS, MAMMO: HCPCS | Performed by: RADIOLOGY

## 2025-03-13 ENCOUNTER — TELEPHONE (OUTPATIENT)
Dept: PRIMARY CARE | Facility: CLINIC | Age: 80
End: 2025-03-13
Payer: MEDICARE

## 2025-03-13 DIAGNOSIS — M17.0 PRIMARY OSTEOARTHRITIS OF BOTH KNEES: ICD-10-CM

## 2025-03-13 RX ORDER — MELOXICAM 15 MG/1
15 TABLET ORAL DAILY
Qty: 90 TABLET | Refills: 0 | Status: SHIPPED | OUTPATIENT
Start: 2025-03-13 | End: 2026-03-13

## 2025-03-13 NOTE — TELEPHONE ENCOUNTER
REFILL  MEDICATION:     Meloxicam 15 MG; Take 1 tablet daily.     PHARM: [facility]    LR: 12/12/24      90 tablets with 0 refills   LV: 5/24/24  NV: 3/31/24

## 2025-03-31 ENCOUNTER — APPOINTMENT (OUTPATIENT)
Dept: PRIMARY CARE | Facility: CLINIC | Age: 80
End: 2025-03-31
Payer: MEDICARE

## 2025-03-31 VITALS
HEIGHT: 59 IN | BODY MASS INDEX: 30.04 KG/M2 | WEIGHT: 149 LBS | DIASTOLIC BLOOD PRESSURE: 70 MMHG | TEMPERATURE: 97.8 F | SYSTOLIC BLOOD PRESSURE: 120 MMHG

## 2025-03-31 DIAGNOSIS — I70.223 ATHEROSCLEROSIS OF NATIVE ARTERIES OF EXTREMITIES WITH REST PAIN, BILATERAL LEGS: ICD-10-CM

## 2025-03-31 DIAGNOSIS — I25.118 CORONARY ARTERY DISEASE OF NATIVE ARTERY OF NATIVE HEART WITH STABLE ANGINA PECTORIS: ICD-10-CM

## 2025-03-31 DIAGNOSIS — C50.912 MALIGNANT NEOPLASM OF LEFT FEMALE BREAST, UNSPECIFIED ESTROGEN RECEPTOR STATUS, UNSPECIFIED SITE OF BREAST: ICD-10-CM

## 2025-03-31 DIAGNOSIS — Z86.39 HISTORY OF HYPERGLYCEMIA: ICD-10-CM

## 2025-03-31 DIAGNOSIS — I10 BENIGN ESSENTIAL HYPERTENSION: ICD-10-CM

## 2025-03-31 DIAGNOSIS — Z00.00 ROUTINE GENERAL MEDICAL EXAMINATION AT HEALTH CARE FACILITY: ICD-10-CM

## 2025-03-31 DIAGNOSIS — E03.9 ACQUIRED HYPOTHYROIDISM: Primary | ICD-10-CM

## 2025-03-31 DIAGNOSIS — Z78.0 ASYMPTOMATIC MENOPAUSAL STATE: ICD-10-CM

## 2025-03-31 PROCEDURE — 1160F RVW MEDS BY RX/DR IN RCRD: CPT | Performed by: FAMILY MEDICINE

## 2025-03-31 PROCEDURE — 1170F FXNL STATUS ASSESSED: CPT | Performed by: FAMILY MEDICINE

## 2025-03-31 PROCEDURE — 1036F TOBACCO NON-USER: CPT | Performed by: FAMILY MEDICINE

## 2025-03-31 PROCEDURE — 99397 PER PM REEVAL EST PAT 65+ YR: CPT | Performed by: FAMILY MEDICINE

## 2025-03-31 PROCEDURE — 1123F ACP DISCUSS/DSCN MKR DOCD: CPT | Performed by: FAMILY MEDICINE

## 2025-03-31 PROCEDURE — 1159F MED LIST DOCD IN RCRD: CPT | Performed by: FAMILY MEDICINE

## 2025-03-31 PROCEDURE — 3078F DIAST BP <80 MM HG: CPT | Performed by: FAMILY MEDICINE

## 2025-03-31 PROCEDURE — G0439 PPPS, SUBSEQ VISIT: HCPCS | Performed by: FAMILY MEDICINE

## 2025-03-31 PROCEDURE — 1158F ADVNC CARE PLAN TLK DOCD: CPT | Performed by: FAMILY MEDICINE

## 2025-03-31 PROCEDURE — 3074F SYST BP LT 130 MM HG: CPT | Performed by: FAMILY MEDICINE

## 2025-03-31 RX ORDER — OLMESARTAN MEDOXOMIL 40 MG/1
40 TABLET ORAL DAILY
COMMUNITY
Start: 2025-03-19

## 2025-03-31 ASSESSMENT — ENCOUNTER SYMPTOMS
FREQUENCY: 1
DYSURIA: 0
NAUSEA: 0
FEVER: 0
SINUS PRESSURE: 0
DYSPHORIC MOOD: 0
VOMITING: 0
OCCASIONAL FEELINGS OF UNSTEADINESS: 0
SLEEP DISTURBANCE: 0
BACK PAIN: 0
RHINORRHEA: 0
WOUND: 0
NUMBNESS: 1
DIZZINESS: 0
WEAKNESS: 0
ROS SKIN COMMENTS: NO MOLES GROWING OR CHANGING.
COUGH: 0
ADENOPATHY: 0
FATIGUE: 0
BLOOD IN STOOL: 0
LOSS OF SENSATION IN FEET: 0
ABDOMINAL PAIN: 0
HEMATURIA: 0
HEADACHES: 0
CONSTIPATION: 0
SHORTNESS OF BREATH: 0
MYALGIAS: 0
VOICE CHANGE: 0
NERVOUS/ANXIOUS: 0
ARTHRALGIAS: 1
DEPRESSION: 0
TROUBLE SWALLOWING: 0
SORE THROAT: 0
DIARRHEA: 0
WHEEZING: 0
PALPITATIONS: 0

## 2025-03-31 ASSESSMENT — ACTIVITIES OF DAILY LIVING (ADL)
BATHING: INDEPENDENT
DOING_HOUSEWORK: INDEPENDENT
GROCERY_SHOPPING: INDEPENDENT
DRESSING: INDEPENDENT
TAKING_MEDICATION: INDEPENDENT
MANAGING_FINANCES: INDEPENDENT

## 2025-03-31 NOTE — PROGRESS NOTES
Subjective   Reason for Visit: Ana Vega is an 79 y.o. female here for a Medicare Wellness visit.        She had a bladder pacemaker placed in July.  That has helped prevent UTI's, per pt.    Otherwise no surgeries or hospitalizations.     Reviewed all medications by prescribing practitioner or clinical pharmacist (such as prescriptions, OTCs, herbal therapies and supplements) and documented in the medical record.  Current Outpatient Medications on File Prior to Visit   Medication Sig Dispense Refill    olmesartan (BENIcar) 40 mg tablet Take 1 tablet (40 mg) by mouth once daily.      amLODIPine (Norvasc) 10 mg tablet Take 1 tablet (10 mg) by mouth once daily.      aspirin 81 mg EC tablet Take 1 tablet (81 mg) by mouth.      calcium carbonate 600 mg calcium (1,500 mg) tablet Take 1 tablet (600 mg) by mouth once daily.      cetirizine (ZyrTEC) 10 mg tablet Take 1 tablet (10 mg) by mouth 2 times a day.      estradiol (Estrace) 0.01 % (0.1 mg/gram) vaginal cream Apply pea size amount 0.5 gram to vaginal opening with finger daily for 2 weeks, then 2-3 times per week. 42.5 g 5    gabapentin (Neurontin) 400 mg capsule Take 1 capsule (400 mg) by mouth 3 times a day. 270 capsule 0    isosorbide mononitrate ER (Imdur) 60 mg 24 hr tablet Take 1 tablet (60 mg) by mouth once daily.      levothyroxine (Synthroid) 50 mcg tablet Take 1 tablet (50 mcg) by mouth once daily. 90 tablet 1    meloxicam (Mobic) 15 mg tablet Take 1 tablet (15 mg) by mouth once daily. 90 tablet 0    metoprolol tartrate (Lopressor) 25 mg tablet Take 1 tablet (25 mg) by mouth 2 times a day.      multivitamin tablet Take 1 tablet by mouth once daily.      nitroglycerin (Nitrostat) 0.4 mg SL tablet Place 1 tablet (0.4 mg) under the tongue.      omeprazole (PriLOSEC) 40 mg DR capsule Take 1 capsule (40 mg) by mouth once daily. Do not crush or chew. 90 capsule 2    rosuvastatin (Crestor) 20 mg tablet Take 1 tablet (20 mg) by mouth once daily.       [DISCONTINUED] losartan (Cozaar) 50 mg tablet Take 1 tablet (50 mg) by mouth once daily.      [DISCONTINUED] omega 3-dha-epa-fish oil 1,200 (144-216) mg capsule Take 1 capsule (1,200 mg) by mouth once daily. (Patient not taking: Reported on 2/14/2025)       No current facility-administered medications on file prior to visit.     She has had breast cancer in 2016.  She had a PE just following the surgery for that.  She is not presently on anticoagulation.    HPI  Tobacco Use: Medium Risk (3/31/2025)    Patient History     Smoking Tobacco Use: Former     Smokeless Tobacco Use: Never     Passive Exposure: Not on file   She quit smoking more than forty years ago.  No alcohol.  No drug use.  She exercises four times per week for fifteen years.  Diet is pretty healthy.  Does not have advanced directives.    Full code.  Daughter Debo would be [place].         Patient Care Team:  Jose Corea DO as PCP - General (Family Medicine)  Jose Corea DO as PCP - [employer] PCP  Jose Luis Ortega MD as Consulting Physician (Hematology and Oncology)     Review of Systems   Constitutional:  Negative for fatigue and fever.   HENT:  Negative for rhinorrhea, sinus pressure, sore throat, trouble swallowing and voice change.    Respiratory:  Negative for cough, shortness of breath and wheezing.    Cardiovascular:  Negative for chest pain, palpitations and leg swelling.   Gastrointestinal:  Negative for abdominal pain, blood in stool, constipation, diarrhea, nausea and vomiting.   Genitourinary:  Positive for frequency (improving with bladder pacemaker.). Negative for dysuria, hematuria and vaginal bleeding.   Musculoskeletal:  Positive for arthralgias (in fingers.  arthritis.). Negative for back pain and myalgias.   Skin:  Negative for rash and wound.        No moles growing or changing.   Neurological:  Positive for numbness (tingling in the left hip.). Negative for dizziness, syncope, weakness and headaches.  "  Hematological:  Negative for adenopathy.   Psychiatric/Behavioral:  Negative for dysphoric mood, self-injury, sleep disturbance and suicidal ideas. The patient is not nervous/anxious.        Objective   Vitals:  /70 (BP Location: Right arm, Patient Position: Sitting)   Temp 36.6 °C (97.8 °F) (Skin)   Ht 1.486 m (4' 10.5\")   Wt 67.6 kg (149 lb)   BMI 30.61 kg/m²       Physical Exam  Vitals reviewed.   Constitutional:       General: She is not in acute distress.     Appearance: Normal appearance. She is not ill-appearing or toxic-appearing.   HENT:      Head: Normocephalic and atraumatic.      Right Ear: Tympanic membrane, ear canal and external ear normal.      Left Ear: Tympanic membrane, ear canal and external ear normal.      Nose: Nose normal.      Mouth/Throat:      Mouth: Mucous membranes are moist.   Eyes:      Extraocular Movements: Extraocular movements intact.      Conjunctiva/sclera: Conjunctivae normal.      Pupils: Pupils are equal, round, and reactive to light.   Cardiovascular:      Rate and Rhythm: Normal rate and regular rhythm.      Heart sounds: No murmur heard.  Pulmonary:      Effort: Pulmonary effort is normal.      Breath sounds: Normal breath sounds.   Abdominal:      General: Bowel sounds are normal. There is no distension.      Palpations: Abdomen is soft. There is no mass.      Tenderness: There is no abdominal tenderness. There is no guarding or rebound.   Musculoskeletal:         General: No tenderness.      Cervical back: Neck supple.      Right lower leg: No edema.      Left lower leg: No edema.   Skin:     Coloration: Skin is not jaundiced or pale.      Findings: No rash.   Neurological:      General: No focal deficit present.      Mental Status: She is alert and oriented to person, place, and time. Mental status is at baseline.   Psychiatric:         Mood and Affect: Mood normal.         Behavior: Behavior normal.         Thought Content: Thought content normal.         " Judgment: Judgment normal.         Assessment & Plan  Acquired hypothyroidism    Orders:    Thyroid Stimulating Hormone; Future    Malignant neoplasm of left female breast, unspecified estrogen receptor status, unspecified site of breast         Atherosclerosis of native arteries of extremities with rest pain, bilateral legs         Coronary artery disease of native artery of native heart with stable angina pectoris    Orders:    Lipid Panel; Future    Benign essential hypertension    Orders:    Urinalysis with Reflex Microscopic; Future    Comprehensive Metabolic Panel; Future    CBC and Auto Differential; Future    History of hyperglycemia    Orders:    Hemoglobin A1C; Future    Routine general medical examination at health care facility    Orders:    1 Year Follow Up In Primary Care - Wellness Exam; Future    Asymptomatic menopausal state    Orders:    XR DEXA bone density; Future     Annual Wellness exam completed   Preventive Health history reviewed:  Labs ordered    Mammogram done 3/2025.  BMD last done 11/2022.  Ordered today.  Cscope done 10/24.  One small polyp found.   Low dose CT chest for lung cancer screening not indicated.  Quit smoking more than forty years ago.  Depression Screening done  Advanced Directives Discussion Completed  Cardiovascular risk discussed and if needed, lifestyle modifications recommended, including nutritional choices, exercise, and elimination of habits contributing to risk.  We agreed on a plan to reduce the current cardiovascular risk.  See ecalc ASCVD Risk  Plus for data discussed regarding risk and risk reduction opportunities.  Aspirin use is indicated after reviewing the updated guidelines.   Vaccines:  Influenza recommended yearly in the fall.  Prevnar 13 done 2015  Pneumovax 23 done 2018  Shingrix completed 2019  The ASCVD Risk score (Ken COCHRAN, et al., 2019) failed to calculate for the following reasons:    The 2019 ASCVD risk score is only valid for ages 40  to 79  I will order labs to be done fasting.  I ordered a bone density scan.  Continue with exercise.  Continue with the same medications.  Call if refills are needed.  Return in one year for a Medicare Wellness Visit.   Jose Corea, DO

## 2025-03-31 NOTE — PATIENT INSTRUCTIONS
I will order labs to be done fasting.  I ordered a bone density scan.  Continue with exercise.  Continue with the same medications.  Call if refills are needed.  Return in one year for a Medicare Wellness Visit.

## 2025-04-02 DIAGNOSIS — I25.118 CORONARY ARTERY DISEASE OF NATIVE ARTERY OF NATIVE HEART WITH STABLE ANGINA PECTORIS: ICD-10-CM

## 2025-04-02 DIAGNOSIS — E78.49 OTHER HYPERLIPIDEMIA: ICD-10-CM

## 2025-04-02 DIAGNOSIS — R82.90 ABNORMAL URINE FINDINGS: Primary | ICD-10-CM

## 2025-04-02 LAB
ALBUMIN SERPL-MCNC: 4.6 G/DL (ref 3.6–5.1)
ALP SERPL-CCNC: 79 U/L (ref 37–153)
ALT SERPL-CCNC: 14 U/L (ref 6–29)
ANION GAP SERPL CALCULATED.4IONS-SCNC: 9 MMOL/L (CALC) (ref 7–17)
APPEARANCE UR: ABNORMAL
AST SERPL-CCNC: 21 U/L (ref 10–35)
BACTERIA #/AREA URNS HPF: ABNORMAL /HPF
BASOPHILS # BLD AUTO: 53 CELLS/UL (ref 0–200)
BASOPHILS NFR BLD AUTO: 0.7 %
BILIRUB SERPL-MCNC: 0.5 MG/DL (ref 0.2–1.2)
BILIRUB UR QL STRIP: NEGATIVE
BUN SERPL-MCNC: 14 MG/DL (ref 7–25)
CALCIUM SERPL-MCNC: 10.2 MG/DL (ref 8.6–10.4)
CHLORIDE SERPL-SCNC: 97 MMOL/L (ref 98–110)
CHOLEST SERPL-MCNC: 250 MG/DL
CHOLEST/HDLC SERPL: 4.6 (CALC)
CO2 SERPL-SCNC: 29 MMOL/L (ref 20–32)
COLOR UR: YELLOW
CREAT SERPL-MCNC: 0.79 MG/DL (ref 0.6–0.95)
EGFRCR SERPLBLD CKD-EPI 2021: 75 ML/MIN/1.73M2
EOSINOPHIL # BLD AUTO: 270 CELLS/UL (ref 15–500)
EOSINOPHIL NFR BLD AUTO: 3.6 %
ERYTHROCYTE [DISTWIDTH] IN BLOOD BY AUTOMATED COUNT: 12.8 % (ref 11–15)
EST. AVERAGE GLUCOSE BLD GHB EST-MCNC: 114 MG/DL
EST. AVERAGE GLUCOSE BLD GHB EST-SCNC: 6.3 MMOL/L
GLUCOSE SERPL-MCNC: 81 MG/DL (ref 65–99)
GLUCOSE UR QL STRIP: NEGATIVE
HBA1C MFR BLD: 5.6 % OF TOTAL HGB
HCT VFR BLD AUTO: 45.8 % (ref 35–45)
HDLC SERPL-MCNC: 54 MG/DL
HGB BLD-MCNC: 14.8 G/DL (ref 11.7–15.5)
HGB UR QL STRIP: ABNORMAL
HYALINE CASTS #/AREA URNS LPF: ABNORMAL /LPF
KETONES UR QL STRIP: NEGATIVE
LDLC SERPL CALC-MCNC: 159 MG/DL (CALC)
LEUKOCYTE ESTERASE UR QL STRIP: ABNORMAL
LYMPHOCYTES # BLD AUTO: 1478 CELLS/UL (ref 850–3900)
LYMPHOCYTES NFR BLD AUTO: 19.7 %
MCH RBC QN AUTO: 30.3 PG (ref 27–33)
MCHC RBC AUTO-ENTMCNC: 32.3 G/DL (ref 32–36)
MCV RBC AUTO: 93.9 FL (ref 80–100)
MONOCYTES # BLD AUTO: 623 CELLS/UL (ref 200–950)
MONOCYTES NFR BLD AUTO: 8.3 %
NEUTROPHILS # BLD AUTO: 5078 CELLS/UL (ref 1500–7800)
NEUTROPHILS NFR BLD AUTO: 67.7 %
NITRITE UR QL STRIP: NEGATIVE
NONHDLC SERPL-MCNC: 196 MG/DL (CALC)
PH UR STRIP: 7.5 [PH] (ref 5–8)
PLATELET # BLD AUTO: 303 THOUSAND/UL (ref 140–400)
PMV BLD REES-ECKER: 9.7 FL (ref 7.5–12.5)
POTASSIUM SERPL-SCNC: 4.8 MMOL/L (ref 3.5–5.3)
PROT SERPL-MCNC: 7.8 G/DL (ref 6.1–8.1)
PROT UR QL STRIP: ABNORMAL
RBC # BLD AUTO: 4.88 MILLION/UL (ref 3.8–5.1)
RBC #/AREA URNS HPF: ABNORMAL /HPF
SERVICE CMNT-IMP: ABNORMAL
SODIUM SERPL-SCNC: 135 MMOL/L (ref 135–146)
SP GR UR STRIP: 1.01 (ref 1–1.03)
SQUAMOUS #/AREA URNS HPF: ABNORMAL /HPF
TRANS CELLS #/AREA URNS HPF: ABNORMAL /HPF
TRIGL SERPL-MCNC: 221 MG/DL
TSH SERPL-ACNC: 4.14 MIU/L (ref 0.4–4.5)
WBC # BLD AUTO: 7.5 THOUSAND/UL (ref 3.8–10.8)
WBC #/AREA URNS HPF: ABNORMAL /HPF

## 2025-04-02 RX ORDER — ROSUVASTATIN CALCIUM 40 MG/1
40 TABLET, COATED ORAL DAILY
Qty: 90 TABLET | Refills: 1 | Status: SHIPPED | OUTPATIENT
Start: 2025-04-02

## 2025-04-06 LAB — BACTERIA UR CULT: NORMAL

## 2025-04-07 ENCOUNTER — HOSPITAL ENCOUNTER (OUTPATIENT)
Dept: RADIOLOGY | Facility: CLINIC | Age: 80
Discharge: HOME | End: 2025-04-07
Payer: MEDICARE

## 2025-04-07 ENCOUNTER — TELEPHONE (OUTPATIENT)
Dept: PRIMARY CARE | Facility: CLINIC | Age: 80
End: 2025-04-07
Payer: MEDICARE

## 2025-04-07 DIAGNOSIS — Z78.0 ASYMPTOMATIC MENOPAUSAL STATE: ICD-10-CM

## 2025-04-07 DIAGNOSIS — R20.2 PARESTHESIA AND PAIN OF BOTH UPPER EXTREMITIES: ICD-10-CM

## 2025-04-07 DIAGNOSIS — M79.602 PARESTHESIA AND PAIN OF BOTH UPPER EXTREMITIES: ICD-10-CM

## 2025-04-07 DIAGNOSIS — M79.601 PARESTHESIA AND PAIN OF BOTH UPPER EXTREMITIES: ICD-10-CM

## 2025-04-07 PROCEDURE — 77080 DXA BONE DENSITY AXIAL: CPT

## 2025-04-07 PROCEDURE — 77080 DXA BONE DENSITY AXIAL: CPT | Performed by: STUDENT IN AN ORGANIZED HEALTH CARE EDUCATION/TRAINING PROGRAM

## 2025-04-07 RX ORDER — GABAPENTIN 400 MG/1
400 CAPSULE ORAL 3 TIMES DAILY
Qty: 270 CAPSULE | Refills: 0 | Status: SHIPPED | OUTPATIENT
Start: 2025-04-07

## 2025-04-07 NOTE — TELEPHONE ENCOUNTER
REFILL  MEDICATION:     Gabapentin 400 MG; Take 1 capsule 3 times a day.     PHARM: [facility]   PHARM NUMBER: [phone]     LR: 1/1/25       270 capsules with 0 refills   LV: 3/31/25  NV: No future appt.

## 2025-05-06 ENCOUNTER — APPOINTMENT (OUTPATIENT)
Dept: PRIMARY CARE | Facility: CLINIC | Age: 80
End: 2025-05-06
Payer: MEDICARE

## 2025-05-06 VITALS
SYSTOLIC BLOOD PRESSURE: 146 MMHG | WEIGHT: 151 LBS | TEMPERATURE: 97.9 F | DIASTOLIC BLOOD PRESSURE: 80 MMHG | BODY MASS INDEX: 31.02 KG/M2

## 2025-05-06 DIAGNOSIS — S22.31XA CLOSED FRACTURE OF ONE RIB OF RIGHT SIDE, INITIAL ENCOUNTER: Primary | ICD-10-CM
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
## 2025-05-06 DIAGNOSIS — R91.1 LUNG NODULE SEEN ON IMAGING STUDY: ICD-10-CM

## 2025-05-06 DIAGNOSIS — I25.118 CORONARY ARTERY DISEASE OF NATIVE ARTERY OF NATIVE HEART WITH STABLE ANGINA PECTORIS: ICD-10-CM

## 2025-05-06 DIAGNOSIS — E78.49 OTHER HYPERLIPIDEMIA: ICD-10-CM

## 2025-05-06 PROCEDURE — 1036F TOBACCO NON-USER: CPT | Performed by: FAMILY MEDICINE

## 2025-05-06 PROCEDURE — 1160F RVW MEDS BY RX/DR IN RCRD: CPT | Performed by: FAMILY MEDICINE

## 2025-05-06 PROCEDURE — 1159F MED LIST DOCD IN RCRD: CPT | Performed by: FAMILY MEDICINE

## 2025-05-06 PROCEDURE — 99214 OFFICE O/P EST MOD 30 MIN: CPT | Performed by: FAMILY MEDICINE

## 2025-05-06 PROCEDURE — 3079F DIAST BP 80-89 MM HG: CPT | Performed by: FAMILY MEDICINE

## 2025-05-06 PROCEDURE — 3077F SYST BP >= 140 MM HG: CPT | Performed by: FAMILY MEDICINE

## 2025-05-06 RX ORDER — ROSUVASTATIN CALCIUM 20 MG/1
20 TABLET, COATED ORAL DAILY
Qty: 90 TABLET | Refills: 1 | Status: SHIPPED | OUTPATIENT
Start: 2025-05-06

## 2025-05-06 NOTE — PROGRESS NOTES
"Subjective   Patient ID: [id number]     Ana Vega is a 79 y.o. female who presents for Hospital Follow-up (Fall resulting in broken rib.).  HPI  She is here for a recheck after being in the hospital.      ER report from 4/25/25 was reviewed today.      She had fallen ten days prior after a \"head spinning\" episode.  She had fallen in the bathtub. She landed on her ribs.  She went to the ER when her breathing became painful with SOB on inhalation due to pain.  She denies any syncope.  No head trauma.  No LOC.    EKG was normal except a RBBB.  CT chest showed a single rib fracture on the right side. She was discharged after she felt better following a dose of pain medication.     She thinks that the vertigo was caused by her cholesterol medication.  She states that she stopped the rosuvastatin and she states the vertigo has not been back.  She is off her statin.    She states that the rib pain has improved.  It now only hurts if she sneezes or coughs.     The CT showed thyroid and lung nodules.  She states that both of these are old findings.  She states she had thyroid nodule biopsies in [place] years ago and the results were benign.  She states the CT chest was last checked in 2021 and the lung nodules were seen there.     She was advised that the lung nodule in the recent scan was new and a repeat CT was recommended to be done in three months.  Upon hearing this, she is agreeable to proceed with a repeat CT chest in three months.            Objective     /80   Temp 36.6 °C (97.9 °F) (Skin)   Wt 68.5 kg (151 lb)   BMI 31.02 kg/m²      Physical Exam  Constitutional:       Appearance: Normal appearance.   Cardiovascular:      Rate and Rhythm: Normal rate and regular rhythm.      Heart sounds: Normal heart sounds. No murmur heard.  Pulmonary:      Effort: Pulmonary effort is normal. No respiratory distress.      Breath sounds: Normal breath sounds.   Musculoskeletal:      Comments: Right ribs mid " axillary region nontender to palpation.     Neurological:      General: No focal deficit present.      Mental Status: She is alert and oriented to person, place, and time.         Assessment/Plan   Problem List Items Addressed This Visit     Coronary artery disease of native artery of native heart with stable angina pectoris    Relevant Medications    rosuvastatin (Crestor) 20 mg tablet    Hyperlipidemia    Relevant Medications    rosuvastatin (Crestor) 20 mg tablet   Other Visit Diagnoses       Closed fracture of one rib of right side, initial encounter    -  Primary      Fall, initial encounter          Lung nodule seen on imaging study        Relevant Orders    CT chest wo IV contrast      After a discussion, you agree to get the CT scan rechecked in three months.  Return in three months.  I will restart you on rosuvastatin.  You agree to restart it at the lower dose but decline the higher dose d/t vertigo with it.  It is good that your rib pain is improving.  Return in three months fasting for a recheck on your cholesterol.  You decline PT following your fall.    Jose Corea DO   Patient was identified as a fall risk. Risk prevention instructions provided.

## 2025-05-06 NOTE — PATIENT INSTRUCTIONS
After a discussion, you agree to get the CT scan rechecked in three months.  Return in three months.  I will restart you on rosuvastatin.  You agree to restart it at the lower dose but decline the higher dose d/t vertigo with it.  It is good that your rib pain is improving.  Return in three months fasting for a recheck on your cholesterol.  You decline PT following your fall.        Ways to Help Prevent Falls at Home    Quick Tips   ? Ask for help if you need it. Most people want to help!   ? Get up slowly after sitting or laying down   ? Wear a medical alert device or keep cell phone in your pocket   ? Use night lights, especially areas near a bathroom   ? Keep the items you use often within reach on a small stool or end table   ? Use an assistive device such as walker or cane, as directed by provider/physical therapy   ? Use a non-slip mat and grab bars in your bathroom. Look for home health sections for best options     Other Areas to Focus On   ? Exercise and nutrition: Regular exercise or taking a falls prevention class are great ways improve strength and balance. Don’t forget to stay hydrated and bring a snack!   ? Medicine side effects: Some medicines can make you sleepy or dizzy, which could cause a fall. Ask your healthcare provider about the side effects your medicines could cause. Be sure to let them know if you take any vitamins or supplements as well.   ? Tripping hazards: Remove items you could trip on, such as loose mats, rugs, cords, and clutter. Wear closed toe shoes with rubber soles.   ? Health and wellness: Get regular checkups with your healthcare provider, plus routine vision and hearing screenings. Talk with your healthcare provider about:   o Your medicines and the possible side effects - bring them in a bag if that is easier!   o Problems with balance or feeling dizzy   o Ways to promote bone health, such as Vitamin D and calcium supplements   o Questions or concerns about falling     *Ask  your healthcare team if you have questions     ©[facility], 2022

## 2025-05-12 ENCOUNTER — TELEPHONE (OUTPATIENT)
Dept: PRIMARY CARE | Facility: CLINIC | Age: 80
End: 2025-05-12
Payer: MEDICARE

## 2025-05-12 DIAGNOSIS — E03.9 ACQUIRED HYPOTHYROIDISM: ICD-10-CM

## 2025-05-12 RX ORDER — LEVOTHYROXINE SODIUM 50 UG/1
50 TABLET ORAL DAILY
Qty: 90 TABLET | Refills: 1 | Status: SHIPPED | OUTPATIENT
Start: 2025-05-12

## 2025-05-12 NOTE — TELEPHONE ENCOUNTER
REFILL  MEDICATION:     Levothyroxine 50 MCG; Take 1 tablet daily.     [facility]   PHARM NUMBER: [phone]    LR: 11/7/24      90 tablets with 1 refill   LV: 5/6/25  NV: 8/15/25

## 2025-05-19 ENCOUNTER — PATIENT OUTREACH (OUTPATIENT)
Dept: CARE COORDINATION | Facility: CLINIC | Age: 80
End: 2025-05-19
Payer: MEDICARE

## 2025-05-28 ENCOUNTER — PATIENT OUTREACH (OUTPATIENT)
Dept: CARE COORDINATION | Facility: CLINIC | Age: 80
End: 2025-05-28
Payer: MEDICARE

## 2025-05-29 ENCOUNTER — OFFICE VISIT (OUTPATIENT)
Dept: URGENT CARE | Age: 80
End: 2025-05-29
Payer: MEDICARE

## 2025-05-29 VITALS
HEART RATE: 85 BPM | TEMPERATURE: 97.7 F | DIASTOLIC BLOOD PRESSURE: 67 MMHG | SYSTOLIC BLOOD PRESSURE: 144 MMHG | RESPIRATION RATE: 16 BRPM | OXYGEN SATURATION: 96 %

## 2025-05-29 DIAGNOSIS — N39.0 URINARY TRACT INFECTION WITH HEMATURIA, SITE UNSPECIFIED: Primary | ICD-10-CM

## 2025-05-29 DIAGNOSIS — R31.9 URINARY TRACT INFECTION WITH HEMATURIA, SITE UNSPECIFIED: Primary | ICD-10-CM

## 2025-05-29 DIAGNOSIS — N39.0 RECURRENT UTI: ICD-10-CM

## 2025-05-29 LAB
POC BILIRUBIN, URINE: NEGATIVE
POC BLOOD, URINE: NEGATIVE
POC GLUCOSE, URINE: NEGATIVE MG/DL
POC KETONES, URINE: NEGATIVE MG/DL
POC LEUKOCYTES, URINE: ABNORMAL
POC NITRITE,URINE: NEGATIVE
POC PH, URINE: 6 PH
POC PROTEIN, URINE: ABNORMAL MG/DL
POC SPECIFIC GRAVITY, URINE: 1.01
POC UROBILINOGEN, URINE: 0.2 EU/DL

## 2025-05-29 RX ORDER — NITROFURANTOIN 25; 75 MG/1; MG/1
100 CAPSULE ORAL 2 TIMES DAILY
Qty: 14 CAPSULE | Refills: 0 | Status: SHIPPED | OUTPATIENT
Start: 2025-05-29 | End: 2025-06-05

## 2025-05-29 NOTE — PATIENT INSTRUCTIONS
Follow up with Primary Care Provider and established Urologist. We advised seeking immediate emergency medical attention if symptoms fail to improve, worsen or any concerning symptoms arise. Patient voiced full understanding and agreement to plan.    We discussed with the patient our clinical thoughts at this time given the above findings and clinical assessment and we had a shared decision-making conversation in a patient-centered decision-making model on how to proceed forward. The patient was instructed on the importance of a close follow-up with Primary Care Provider and other care providers. The patient was also advised that an Urgent care diagnosis is often a preliminary impression and that definitive care is often not able to be given completley in the Urgent care setting.

## 2025-05-29 NOTE — PROGRESS NOTES
"Subjective   Patient ID: Ana Vega is a 79 y.o. female. They present today with a chief complaint of UTI.    History of Present Illness  Ana is a pleasant 79-year-old female with history of recurrent UTIs who presents to the urgent care for burning and frequency of urination for 2 nights.  Patient states last year she had 4 UTIs however had bladder procedure performed and follows with urology and feels this is her first UTI of the year.  Patient denies fever, severe abdominal pain, vomiting or diarrhea.  Patient is seeking evaluation reassurance.  Patient is requesting similar treatment as previous; \"I think nitrofurantoin was prescribed by my primary care provider and worked really well\".  No other symptoms or concerns otherwise reported.  Patient denies attempting any over-the-counter remedies for symptom management otherwise.    Past Medical History  Allergies as of 05/29/2025 - Reviewed 05/06/2025   Allergen Reaction Noted    Diphenhydramine Hives 02/07/2023    Lisinopril Cough 02/07/2023       Prescriptions Prior to Admission[1]     Medical History[2]    Surgical History[3]     reports that she quit smoking about 49 years ago. Her smoking use included cigarettes. She has never used smokeless tobacco. She reports that she does not currently use alcohol. She reports that she does not currently use drugs.    Review of Systems  A 10-point review of systems was performed, otherwise unremarkable unless stated in the history of present illness.                Objective    Vitals:    05/29/25 1009   BP: 144/67   Pulse: 85   Resp: 16   Temp: 36.5 °C (97.7 °F)   SpO2: 96%     No LMP recorded. Patient is postmenopausal.    Gen: Vitals noted and reviewed, no evidence of acute distress, well developed and afebrile.   Psych: Mood and affect appropriate for setting.  Head/Face: Atraumatic and normocephalic.   Neuro: No focal deficits noted.  Cardiac: Regular rate and rhythm no murmur.   Lungs: Clear to auscultation " throughout, No evidence of wheezing, rhonchi or crackles. Good aeration throughout.   Abdomen: Soft, minimal tenderness in suprapubic region without rebound or guarding and otherwise non-tender throughout. Normoactive bowel sounds. No evidence of palpable masses. No CVA tenderness noted.    Extremities: Symmetrical, No peripheral edema  Skin: Without evidence of ecchymosis, wounds, or rashes.      Point of Care Test & Imaging Results from this visit  Results for orders placed or performed in visit on 05/29/25   POCT UA Automated manually resulted   Result Value Ref Range    POC Glucose, Urine NEGATIVE NEGATIVE mg/dl    POC Bilirubin, Urine NEGATIVE NEGATIVE    POC Ketones, Urine NEGATIVE NEGATIVE mg/dl    POC Specific Gravity, Urine 1.015 1.005 - 1.035    POC Blood, Urine NEGATIVE NEGATIVE    POC PH, Urine 6.0 No Reference Range Established PH    POC Protein, Urine TRACE (A) NEGATIVE mg/dl    POC Urobilinogen, Urine 0.2 0.2, 1.0 EU/DL    Poc Nitrite, Urine NEGATIVE NEGATIVE    POC Leukocytes, Urine SMALL (1+) (A) NEGATIVE      Imaging  No results found.    Cardiology, Vascular, and Other Imaging  No other imaging results found for the past 2 days    Diagnostic study results (if any) were reviewed by Tyree Morgan DO.    Assessment/Plan   Allergies, medications, history, and pertinent labs/EKGs/Imaging reviewed by Tyree Morgan DO.     Medical Decision Making  Discussed with the patient symptoms and clinical presentation findings suggestive of an acute urinary tract infection of unspecified site and out of on clear etiology with history of recurrence. We advise close symptom monitoring supportive treatment adequate hydration.  Reviewed patient allergies to medications and initially discussed treatment with cephalexin however after reviewing chart and prior treatment and patients statement that previously nitrofurantoin worked really well we agreed to prescribe this for antimicrobial coverage.  We sent urine out for  culture and sensitivities and will follow-up on results and adjust treatments accordingly. Follow up with Primary Care Provider and established Urologist. We advised seeking immediate emergency medical attention if symptoms fail to improve, worsen or any concerning symptoms arise. Patient voiced full understanding and agreement to plan.    We discussed with the patient our clinical thoughts at this time given the above findings and clinical assessment and we had a shared decision-making conversation in a patient-centered decision-making model on how to proceed forward. The patient was instructed on the importance of a close follow-up with Primary Care Provider and other care providers. The patient was also advised that an Urgent care diagnosis is often a preliminary impression and that definitive care is often not able to be given completley in the Urgent care setting.    Orders and Diagnoses  Diagnoses and all orders for this visit:  Urinary tract infection with hematuria, site unspecified  -     nitrofurantoin, macrocrystal-monohydrate, (Macrobid) 100 mg capsule; Take 1 capsule (100 mg) by mouth 2 times a day for 7 days.  -     Urine Culture  Recurrent UTI  -     POCT UA Automated manually resulted  -     nitrofurantoin, macrocrystal-monohydrate, (Macrobid) 100 mg capsule; Take 1 capsule (100 mg) by mouth 2 times a day for 7 days.  -     Urine Culture      Medical Admin Record      Patient disposition: Home    Electronically signed by Tyree Morgan DO  10:19 AM           [1] (Not in a hospital admission)   [2]   Past Medical History:  Diagnosis Date    Breast cancer 2016    Left    Coronary artery disease     Encounter for fitting and adjustment of other specified devices     Pessary maintenance    Encounter for screening for malignant neoplasm of cervix 12/01/2013    Screening for malignant neoplasm of cervix    GERD (gastroesophageal reflux disease)     Hx antineoplastic chemo     Hyperlipidemia     Hypertension      Hypothyroidism     Other abnormal and inconclusive findings on diagnostic imaging of breast 02/01/2016    Abnormal mammogram    Person consulting for explanation of examination or test findings     Visit for review of DEXA scan    Personal history of irradiation     Personal history of other diseases of the musculoskeletal system and connective tissue     Personal history of arthritis    Personal history of other diseases of urinary system 05/17/2022    History of hematuria    Personal history of other endocrine, nutritional and metabolic disease 06/20/2019    History of hypothyroidism    Personal history of other medical treatment     H/O mammogram    Personal history of pulmonary embolism     History of pulmonary embolism s/p surgery   [3]   Past Surgical History:  Procedure Laterality Date    BREAST LUMPECTOMY  06/06/2016    Breast Surgery Lumpectomy    BUNIONECTOMY      CAROTID STENT      CATARACT EXTRACTION  03/26/2014    Cataract Surgery    COLONOSCOPY  03/26/2014    Complete Colonoscopy    OTHER SURGICAL HISTORY      SACRAL NEUROMODULATION Right 06/10/2024    Axonics    US GUIDED THYROID BIOPSY  12/23/2014    US GUIDED THYROID BIOPSY 12/23/2014 [facility]

## 2025-05-31 LAB — BACTERIA UR CULT: ABNORMAL

## 2025-06-04 ENCOUNTER — TELEPHONE (OUTPATIENT)
Dept: PRIMARY CARE | Facility: CLINIC | Age: 80
End: 2025-06-04
Payer: MEDICARE

## 2025-06-04 DIAGNOSIS — M17.0 PRIMARY OSTEOARTHRITIS OF BOTH KNEES: ICD-10-CM

## 2025-06-04 RX ORDER — MELOXICAM 15 MG/1
15 TABLET ORAL DAILY
Qty: 90 TABLET | Refills: 0 | Status: SHIPPED | OUTPATIENT
Start: 2025-06-04 | End: 2026-06-04

## 2025-06-04 NOTE — TELEPHONE ENCOUNTER
REFILL  MEDICATION:     Meloxicam 15 MG; Take 1 tablet daily.     PHARM: [facility]     LR: 3/13/25       90 tablets with 0 refills   LV: 5/6/25  NV: 8/15/25

## 2025-06-19 ENCOUNTER — APPOINTMENT (OUTPATIENT)
Dept: UROLOGY | Facility: CLINIC | Age: 80
End: 2025-06-19
Payer: MEDICARE

## 2025-06-19 VITALS — DIASTOLIC BLOOD PRESSURE: 78 MMHG | HEART RATE: 85 BPM | SYSTOLIC BLOOD PRESSURE: 185 MMHG | TEMPERATURE: 98.1 F

## 2025-06-19 DIAGNOSIS — E03.9 ACQUIRED HYPOTHYROIDISM: ICD-10-CM

## 2025-06-19 DIAGNOSIS — M17.0 PRIMARY OSTEOARTHRITIS OF BOTH KNEES: ICD-10-CM

## 2025-06-19 DIAGNOSIS — N39.0 URINARY TRACT INFECTION WITHOUT HEMATURIA, SITE UNSPECIFIED: ICD-10-CM

## 2025-06-19 DIAGNOSIS — N39.46 MIXED INCONTINENCE: ICD-10-CM

## 2025-06-19 LAB
POC APPEARANCE, URINE: CLEAR
POC BILIRUBIN, URINE: NEGATIVE
POC BLOOD, URINE: ABNORMAL
POC COLOR, URINE: YELLOW
POC GLUCOSE, URINE: NEGATIVE MG/DL
POC KETONES, URINE: NEGATIVE MG/DL
POC LEUKOCYTES, URINE: ABNORMAL
POC NITRITE,URINE: NEGATIVE
POC PH, URINE: 6 PH
POC PROTEIN, URINE: ABNORMAL MG/DL
POC SPECIFIC GRAVITY, URINE: 1.01
POC UROBILINOGEN, URINE: 0.2 EU/DL

## 2025-06-19 PROCEDURE — 99214 OFFICE O/P EST MOD 30 MIN: CPT | Performed by: UROLOGY

## 2025-06-19 PROCEDURE — 3077F SYST BP >= 140 MM HG: CPT | Performed by: UROLOGY

## 2025-06-19 PROCEDURE — 51798 US URINE CAPACITY MEASURE: CPT | Performed by: UROLOGY

## 2025-06-19 PROCEDURE — 3078F DIAST BP <80 MM HG: CPT | Performed by: UROLOGY

## 2025-06-19 PROCEDURE — G2211 COMPLEX E/M VISIT ADD ON: HCPCS | Performed by: UROLOGY

## 2025-06-19 PROCEDURE — 1159F MED LIST DOCD IN RCRD: CPT | Performed by: UROLOGY

## 2025-06-19 PROCEDURE — 81003 URINALYSIS AUTO W/O SCOPE: CPT | Performed by: UROLOGY

## 2025-06-19 RX ORDER — SULFAMETHOXAZOLE AND TRIMETHOPRIM 800; 160 MG/1; MG/1
1 TABLET ORAL 2 TIMES DAILY
Qty: 10 TABLET | Refills: 0 | Status: SHIPPED | OUTPATIENT
Start: 2025-06-19 | End: 2025-06-24

## 2025-06-19 ASSESSMENT — ENCOUNTER SYMPTOMS
LOSS OF SENSATION IN FEET: 0
OCCASIONAL FEELINGS OF UNSTEADINESS: 0
DEPRESSION: 0

## 2025-06-19 NOTE — PROGRESS NOTES
HISTORY OF PRESENT ILLNESS:  79 y.o.   with hx of  voiding dysfunction and retention  Axonics stage 1 on 6/10/24 and stage 2 on 24.Last seen in Office [24] Last seen By Beverley Patterson [24]    The patient had been doing well. She had a UTI 1 week ago, culture showed, she was treated with nitrofurantoin. UA today shows WBC, protein and blood.Dtfx 6-7 and NTFx 3.     She also complaints of worsening bugle complaints. She also has a sensation of incomplete bladder emptying. Her PVR is >200 ml.     She denies any vaginal complaints, no abnormal bleeding or discharge.     She has no other complaints.    Last Seen By Beverley Patterson 24:  Ana Vega is a 78 y.o. female who presents for follow up sacral neuromodulation implanted on 24 for voiding dysfunction and retention.     Urine small leuk,  ml; doesn't appear infected; happy w results, previously  ml;   DTF 4 x, NTF 2 x, no leakage of urine; happy w results;   On program 1-, 3+ , amp 1.30 would like one click higher 1.40 feels she could empty a little better; always has more difficulty when she comes to office;   PMH, PSH, FH, SH reviewed.  Hx breast cancer, would like to use some estrogen cream in vagina, will call with name of oncologist;   Battery life 8.7 to 10.4 years.    PLAN:Continue current program, doing so well  ml today quite an improvement from before (348 ml) would like amp turned up one notch, increased to 1.4 from 1.3 today; will follow up Axonics clinic 1 year    Will call with name of oncologist to get ok for vaginal estrogen cream before sent in;     Nurse line [phone]     1 year Axonics clinic, first Wednesday afternoon of the month with Beverley    From Previous note 24  1.Retention  2.Chacha   3.Small cystocele    3a. POP-Q: Aa: -1 Ba: -1 C: -6 Gh: 2.5 Pb: 3 TVL: 7.5 Ap: -2.5 Bp: -2.5 D: -7  4.Hx breast CA     This is a telehealth visit to review patient's symptoms.  Patient underwent Axonics  stage I on Felicity 10, 2024 for voiding dysfunction and retention.  She reports improvement in flow and frequency. She reports reports some hesitation to finish the flow but she feels that she is emptying better. She reports over 50% improvement in the flow and freq  She is voiding 8, 7 , 4, 15 , 10 oz with each void on her own based on the diary. This was this morning diary.  This is compared to a total voided volume of 2-5 oz per void at the initial days of the therapy   No pain at the incision site. She is feeling the stimulation from the axonics in the right side of the vagina         I personally reviewed the recent UAs today in clinic. They demonstrated     ROS: 12pt ROS otherwise negative unless stated above in HPI      PHYSICAL EXAMINATION:  No LMP recorded. Patient is postmenopausal.  There is no height or weight on file to calculate BMI.  Visit Vitals  BP (!) 185/78   Pulse 85   Temp 36.7 °C (98.1 °F) (Temporal)   OB Status Postmenopausal   Smoking Status Former     General Appearance: well appearing  Neuro: Alert and oriented     Pelvic:  Genitourinary:  normal external genitalia, Bartholin's glands negative, [place]'s glands negative  Urethra:   normal meatus, non-tender, no periurethral mass  Vagina: mucosa  normal  Cervix: normal  Uterus: normal size, nontender  Adnexae:  negative nontender, no masses      POP-Q (in supine position):       Aa +1     Ba +1     C -5              gh 3     pb 2.5     tvl 8              Ap -2     Bp -2     D -5    Rectal: deferred    PVR (by Ultrasound): 250   Urine dip:   Recent Results (from the past 6 hours)   POCT UA Automated manually resulted    Collection Time: 06/19/25  3:47 PM   Result Value Ref Range    POC Color, Urine Yellow Straw, Yellow, Light-Yellow    POC Appearance, Urine Clear Clear    POC Glucose, Urine NEGATIVE NEGATIVE mg/dl    POC Bilirubin, Urine NEGATIVE NEGATIVE    POC Ketones, Urine NEGATIVE NEGATIVE mg/dl    POC Specific Gravity, Urine 1.010 1.005 -  1.035    POC Blood, Urine SMALL (1+) (A) NEGATIVE    POC PH, Urine 6.0 No Reference Range Established PH    POC Protein, Urine 100 (2+) (A) NEGATIVE mg/dl    POC Urobilinogen, Urine 0.2 0.2, 1.0 EU/DL    Poc Nitrite, Urine NEGATIVE NEGATIVE    POC Leukocytes, Urine LARGE (3+) (A) NEGATIVE        IMPRESSION AND PLAN:  Ana Vega  is an 79 y.o.   with hx of  voiding dysfunction and retention  Axonics stage 1 on 6/10/24 and stage 2 on 24  1.Retention  2.Chacha   3.Small cystocele    3a. POP-Q: Aa: -1 Ba: -1 C: -6 Gh: 2.5 Pb: 3 TVL: 7.5 Ap: -2.5 Bp: -2.5 D: -7  3b.POP-Q  Aa: +1 Ba: +1 C: -5 Gh: 2.5 Pb: 3 TVL: 8 Ap: -2.5 Bp: -2.5 D: -5  4.Hx breast CA       Her UA is equivocal, will send urine for Culture. It appears that her UTI from 2 weeks ago may still be present. We will prescribe Bactrim BID for 5 days.    We discussed treating her UTI will manage her retention as well.    POP-Q  Aa: +1 Ba: +1 C: -5 Gh: 2.5 Pb: 3 TVL: 8 Ap: -2.5 Bp: -2.5 D: -5    I discussed that her prolapse has worsened.     We discussed options for her prolapse, including observation or pessary use and follow up to check retention with pessary. We discussed pessary in great detail, including cleaning schedules and considerations for pessary use. We discussed prolapse repair via surgery as well.    Patient was flitted with #3 ring with support.    FU in 1 week with TRACY Fonseca Attestation  By signing my name below, ISidra Scribe attest that this documentation has been prepared under the direction and in the presence of Julio César Gordon MD. All medical record entries made by the Scribe were at my direction or personally dictated by me. I have reviewed the chart and agree that the record accurately reflects my personal performance of the history, physical exam, discussion and plan.

## 2025-06-20 RX ORDER — LEVOTHYROXINE SODIUM 50 UG/1
50 TABLET ORAL DAILY
Qty: 90 TABLET | Refills: 1 | Status: SHIPPED | OUTPATIENT
Start: 2025-06-20

## 2025-06-20 RX ORDER — MELOXICAM 15 MG/1
15 TABLET ORAL DAILY
Qty: 90 TABLET | Refills: 0 | Status: SHIPPED | OUTPATIENT
Start: 2025-06-20

## 2025-06-21 LAB — BACTERIA UR CULT: NORMAL

## 2025-06-23 ENCOUNTER — TELEPHONE (OUTPATIENT)
Dept: PRIMARY CARE | Facility: CLINIC | Age: 80
End: 2025-06-23
Payer: MEDICARE

## 2025-06-23 DIAGNOSIS — K21.9 GASTROESOPHAGEAL REFLUX DISEASE, UNSPECIFIED WHETHER ESOPHAGITIS PRESENT: ICD-10-CM

## 2025-06-23 RX ORDER — OMEPRAZOLE 40 MG/1
40 CAPSULE, DELAYED RELEASE ORAL DAILY
Qty: 90 CAPSULE | Refills: 2 | Status: SHIPPED | OUTPATIENT
Start: 2025-06-23

## 2025-06-23 NOTE — TELEPHONE ENCOUNTER
REFILL  MEDICATION:     Omeprazole 40 MG DR; Take 1 capsule daily.     [facility]   PHARM NUMBER: [phone]    LR: 6/3/24       90 capsules with 2 refills   LV: 5/6/25  NV: 8/15/25

## 2025-06-24 ENCOUNTER — OFFICE VISIT (OUTPATIENT)
Dept: PRIMARY CARE | Facility: CLINIC | Age: 80
End: 2025-06-24
Payer: MEDICARE

## 2025-06-24 VITALS
DIASTOLIC BLOOD PRESSURE: 80 MMHG | WEIGHT: 151 LBS | TEMPERATURE: 98.2 F | SYSTOLIC BLOOD PRESSURE: 134 MMHG | BODY MASS INDEX: 31.02 KG/M2

## 2025-06-24 DIAGNOSIS — I63.539: ICD-10-CM

## 2025-06-24 DIAGNOSIS — H53.2 DIPLOPIA: ICD-10-CM

## 2025-06-24 DIAGNOSIS — I25.10 CORONARY ARTERY DISEASE INVOLVING NATIVE CORONARY ARTERY OF NATIVE HEART WITHOUT ANGINA PECTORIS: Primary | ICD-10-CM

## 2025-06-24 DIAGNOSIS — R27.0 ATAXIA: ICD-10-CM

## 2025-06-24 DIAGNOSIS — R26.89 IMBALANCE: ICD-10-CM

## 2025-06-24 PROCEDURE — 3075F SYST BP GE 130 - 139MM HG: CPT | Performed by: FAMILY MEDICINE

## 2025-06-24 PROCEDURE — 1036F TOBACCO NON-USER: CPT | Performed by: FAMILY MEDICINE

## 2025-06-24 PROCEDURE — 1160F RVW MEDS BY RX/DR IN RCRD: CPT | Performed by: FAMILY MEDICINE

## 2025-06-24 PROCEDURE — 1159F MED LIST DOCD IN RCRD: CPT | Performed by: FAMILY MEDICINE

## 2025-06-24 PROCEDURE — 99214 OFFICE O/P EST MOD 30 MIN: CPT | Performed by: FAMILY MEDICINE

## 2025-06-24 PROCEDURE — 3079F DIAST BP 80-89 MM HG: CPT | Performed by: FAMILY MEDICINE

## 2025-06-24 RX ORDER — NITROGLYCERIN 0.4 MG/1
0.4 TABLET SUBLINGUAL AS NEEDED
Start: 2025-06-24

## 2025-06-24 NOTE — PATIENT INSTRUCTIONS
I am concerned that you may have had a stroke in the posterior circulation of your brain affecting your coordination and eye movements and balance.  I recommend that you go to the hospital. You will go directly to [place] and your daughter is here to drive you.  Return after you are released.

## 2025-06-24 NOTE — PROGRESS NOTES
"Subjective   Patient ID: [id number]     Ana Vega is a 79 y.o. female who presents for Balance Issues (Patient states that she woke up Sunday and \"whole body was off.\"  Felt shaky and off balance.  Coordination is off and memory is \"terrible\".  Patient states that while talking she will lose her thought.).  HPI    She complains of balance problems and feeling shaky since waking up Sunday.  Her coordination is off.  Her memory is poor.      She felt normal on Saturday night.  This has persisted.     She remains on bactrim for a UTI. She saw urology and he placed a pessary.    No falls.    She feels shaky.     She feels like she has difficulty speaking.  No weakness of the arms or legs.  She feels like \"my coordination is off\".  She felt that her coordination was off during her exercise course that she has been doing for a long time.     She  states she sees double of she turns her head very far (lateral gaze).  Denies any vision changes since Sunday.  She has glaucoma.    Had nausea and vomiting three times yesterday.    Objective     /80   Temp 36.8 °C (98.2 °F) (Skin)   Wt 68.5 kg (151 lb)   BMI 31.02 kg/m²      Physical Exam  Constitutional:       Appearance: Normal appearance.   Eyes:      Conjunctiva/sclera: Conjunctivae normal.      Pupils: Pupils are equal, round, and reactive to light.      Comments: Pupils disconjugate with left lateral gaze/diploplia.   Neck:      Vascular: No carotid bruit.   Cardiovascular:      Rate and Rhythm: Normal rate and regular rhythm.      Heart sounds: Murmur heard.   Pulmonary:      Effort: Pulmonary effort is normal. No respiratory distress.      Breath sounds: Normal breath sounds.   Musculoskeletal:      Cervical back: No rigidity.   Neurological:      Mental Status: She is alert and oriented to person, place, and time.      Cranial Nerves: Cranial nerve deficit (CNI except disconjugate pupils with left lateral gaze.) present.      Coordination: Coordination " abnormal.      Gait: Gait abnormal.      Comments: Hand tap, gait ataxia. Finger to nose mildly impaired. Speech clear and fluent (although she reports difficulty)         Assessment/Plan   Problem List Items Addressed This Visit    None  Visit Diagnoses         Coronary artery disease involving native coronary artery of native heart without angina pectoris    -  Primary    Relevant Medications    nitroglycerin (Nitrostat) 0.4 mg SL tablet      Posterior circulation stroke of uncertain pathology (Multi)              I am concerned that you may have had a stroke in the posterior circulation of your brain affecting your coordination and eye movements and balance.  I recommend that you go to the hospital. You will go directly to [place] and your daughter is here to drive you.  Return after you are released.    Jose Corea, DO

## 2025-06-30 ENCOUNTER — OFFICE VISIT (OUTPATIENT)
Dept: PRIMARY CARE | Facility: CLINIC | Age: 80
End: 2025-06-30
Payer: MEDICARE

## 2025-06-30 VITALS
WEIGHT: 146 LBS | SYSTOLIC BLOOD PRESSURE: 158 MMHG | DIASTOLIC BLOOD PRESSURE: 66 MMHG | BODY MASS INDEX: 29.99 KG/M2 | TEMPERATURE: 97.7 F

## 2025-06-30 DIAGNOSIS — N30.00 ACUTE CYSTITIS WITHOUT HEMATURIA: ICD-10-CM

## 2025-06-30 DIAGNOSIS — R39.9 UTI SYMPTOMS: Primary | ICD-10-CM

## 2025-06-30 DIAGNOSIS — G45.9 TIA (TRANSIENT ISCHEMIC ATTACK): ICD-10-CM

## 2025-06-30 LAB
POC APPEARANCE, URINE: ABNORMAL
POC BILIRUBIN, URINE: ABNORMAL
POC BLOOD, URINE: NEGATIVE
POC COLOR, URINE: ABNORMAL
POC GLUCOSE, URINE: ABNORMAL MG/DL
POC KETONES, URINE: ABNORMAL MG/DL
POC LEUKOCYTES, URINE: ABNORMAL
POC NITRITE,URINE: POSITIVE
POC PH, URINE: 5 PH
POC PROTEIN, URINE: ABNORMAL MG/DL
POC SPECIFIC GRAVITY, URINE: <=1.005
POC UROBILINOGEN, URINE: 2 EU/DL

## 2025-06-30 PROCEDURE — 99214 OFFICE O/P EST MOD 30 MIN: CPT | Performed by: FAMILY MEDICINE

## 2025-06-30 PROCEDURE — 81003 URINALYSIS AUTO W/O SCOPE: CPT | Performed by: FAMILY MEDICINE

## 2025-06-30 PROCEDURE — 1036F TOBACCO NON-USER: CPT | Performed by: FAMILY MEDICINE

## 2025-06-30 PROCEDURE — 3077F SYST BP >= 140 MM HG: CPT | Performed by: FAMILY MEDICINE

## 2025-06-30 PROCEDURE — 1159F MED LIST DOCD IN RCRD: CPT | Performed by: FAMILY MEDICINE

## 2025-06-30 PROCEDURE — 1160F RVW MEDS BY RX/DR IN RCRD: CPT | Performed by: FAMILY MEDICINE

## 2025-06-30 PROCEDURE — 3078F DIAST BP <80 MM HG: CPT | Performed by: FAMILY MEDICINE

## 2025-06-30 RX ORDER — NITROFURANTOIN 25; 75 MG/1; MG/1
100 CAPSULE ORAL 2 TIMES DAILY
Qty: 14 CAPSULE | Refills: 0 | Status: SHIPPED | OUTPATIENT
Start: 2025-06-30 | End: 2025-07-07

## 2025-06-30 ASSESSMENT — ENCOUNTER SYMPTOMS
FLANK PAIN: 1
DYSURIA: 1
HEMATURIA: 0
FREQUENCY: 1

## 2025-06-30 ASSESSMENT — PATIENT HEALTH QUESTIONNAIRE - PHQ9
2. FEELING DOWN, DEPRESSED OR HOPELESS: NOT AT ALL
1. LITTLE INTEREST OR PLEASURE IN DOING THINGS: NOT AT ALL
SUM OF ALL RESPONSES TO PHQ9 QUESTIONS 1 AND 2: 0

## 2025-06-30 NOTE — PROGRESS NOTES
Subjective   Patient ID: [id number]     Ana Vega is a 79 y.o. female who presents for UTI Symptoms.  Difficulty Urinating   This is a recurrent problem. The current episode started yesterday. The problem occurs intermittently. The problem has been gradually worsening. The quality of the pain is described as burning. The pain is at a severity of 6/10. She is Not sexually active. There is No history of pyelonephritis. Associated symptoms include flank pain, frequency and hesitancy. Pertinent negatives include no hematuria.     She is here for a recheck after being seen in the hospital.     She was last seen here on 6/24/25.  On that visit, I recommended that she go directly to the Er for stroke like symptoms. ER report 6/24/25 reviewed today.  DC summary is not yet available in Epic.  She had a normal neuro exam in the ER and was diagnosed with a TIA.  She had diplopia with lateral gaze when here in the office today.  CT negative for infarct.  CTA head/neck neg.  MRI was normal.   She had a UTI.  She had Rocephin in the hospital.       She is feeling better but not entirely back to normal.      No double vision since going home.  No dizziness.  Coordination is better but still a little impaired.    No abd pain.  Dysuria.  Increased urinary frequent.  No gross hematuria.  Objective     /66   Temp 36.5 °C (97.7 °F) (Skin)   Wt 66.2 kg (146 lb)   BMI 29.99 kg/m²      Physical Exam  Constitutional:       Appearance: Normal appearance.   Eyes:      Extraocular Movements: Extraocular movements intact.      Pupils: Pupils are equal, round, and reactive to light.   Cardiovascular:      Rate and Rhythm: Normal rate and regular rhythm.      Heart sounds: Normal heart sounds. No murmur heard.  Pulmonary:      Effort: Pulmonary effort is normal. No respiratory distress.      Breath sounds: Normal breath sounds.   Abdominal:      General: Abdomen is flat.      Palpations: Abdomen is soft.      Tenderness: There is  no abdominal tenderness. There is no guarding or rebound.   Neurological:      General: No focal deficit present.      Mental Status: She is alert and oriented to person, place, and time.      Cranial Nerves: No cranial nerve deficit.      Motor: No weakness.      Coordination: Coordination normal.      Gait: Gait normal.         Assessment/Plan   Problem List Items Addressed This Visit       UTI symptoms - Primary    Relevant Medications    nitrofurantoin, macrocrystal-monohydrate, (Macrobid) 100 mg capsule    Other Relevant Orders    POCT UA Automated manually resulted (Completed)    Urine Culture    UTI (urinary tract infection)    Relevant Medications    nitrofurantoin, macrocrystal-monohydrate, (Macrobid) 100 mg capsule    Other Relevant Orders    Urine Culture     Other Visit Diagnoses         TIA (transient ischemic attack)            I ordered a different antibiotic, nitrofurantoin.   I will order a urine culture.  Follow up with urology as directed on July 8.  Return in one month for a recheck.  Return sooner if anything is worse.     Jose Corea, DO

## 2025-06-30 NOTE — PATIENT INSTRUCTIONS
I ordered a different antibiotic, nitrofurantoin.   I will order a urine culture.  Follow up with urology as directed on July 8.  Return in one month for a recheck.  Return sooner if anything is worse.

## 2025-07-02 LAB — BACTERIA UR CULT: NORMAL

## 2025-07-08 ENCOUNTER — APPOINTMENT (OUTPATIENT)
Dept: UROLOGY | Facility: CLINIC | Age: 80
End: 2025-07-08
Payer: MEDICARE

## 2025-07-08 VITALS
HEART RATE: 81 BPM | BODY MASS INDEX: 31.49 KG/M2 | DIASTOLIC BLOOD PRESSURE: 77 MMHG | HEIGHT: 58 IN | SYSTOLIC BLOOD PRESSURE: 186 MMHG | TEMPERATURE: 98.8 F | WEIGHT: 150 LBS

## 2025-07-08 DIAGNOSIS — R33.9 URINARY RETENTION: Primary | ICD-10-CM

## 2025-07-08 DIAGNOSIS — N95.8 GENITOURINARY SYNDROME OF MENOPAUSE: ICD-10-CM

## 2025-07-08 DIAGNOSIS — R39.9 UTI SYMPTOMS: ICD-10-CM

## 2025-07-08 DIAGNOSIS — N39.46 MIXED INCONTINENCE: ICD-10-CM

## 2025-07-08 LAB
POC APPEARANCE, URINE: CLEAR
POC BILIRUBIN, URINE: NEGATIVE
POC BLOOD, URINE: ABNORMAL
POC COLOR, URINE: YELLOW
POC GLUCOSE, URINE: NEGATIVE MG/DL
POC KETONES, URINE: NEGATIVE MG/DL
POC LEUKOCYTES, URINE: ABNORMAL
POC NITRITE,URINE: NEGATIVE
POC PH, URINE: 6 PH
POC PROTEIN, URINE: NEGATIVE MG/DL
POC SPECIFIC GRAVITY, URINE: 1.01
POC UROBILINOGEN, URINE: 0.2 EU/DL

## 2025-07-08 PROCEDURE — 1159F MED LIST DOCD IN RCRD: CPT | Performed by: NURSE PRACTITIONER

## 2025-07-08 PROCEDURE — 1036F TOBACCO NON-USER: CPT | Performed by: NURSE PRACTITIONER

## 2025-07-08 PROCEDURE — 3078F DIAST BP <80 MM HG: CPT | Performed by: NURSE PRACTITIONER

## 2025-07-08 PROCEDURE — 99214 OFFICE O/P EST MOD 30 MIN: CPT | Performed by: NURSE PRACTITIONER

## 2025-07-08 PROCEDURE — 3077F SYST BP >= 140 MM HG: CPT | Performed by: NURSE PRACTITIONER

## 2025-07-08 PROCEDURE — G2211 COMPLEX E/M VISIT ADD ON: HCPCS | Performed by: NURSE PRACTITIONER

## 2025-07-08 PROCEDURE — 81003 URINALYSIS AUTO W/O SCOPE: CPT | Performed by: NURSE PRACTITIONER

## 2025-07-08 PROCEDURE — 51798 US URINE CAPACITY MEASURE: CPT | Performed by: NURSE PRACTITIONER

## 2025-07-08 NOTE — PROGRESS NOTES
Pessary check and PVR      HISTORY OF PRESENT ILLNESS:  79 y.o.   with hx of  voiding dysfunction and retention , here for pessary check; would like urine checked as concerned UTI coming back, recently treated for two UTIs; straight cath done, explained to patient with pessary, urine always looks infected. Discussed UTI prevention, reviewing cultures, Ecoli + in May, mixed jose guadalupe in  or didn't grow out infection; will proceed w Dmanose; pessary holding her up, no abnormal discharge or bleeding, using estrogen cream at urethra, will use dispenser and insert into vagina as will help that tissue locally in vagina with dispenser;     Last saw Dr. Gordon on 25; fit with size 3 ring w support folding pessary for worsening prolapse and urinary retention,  ml last visit;  ml today;     Recap from 25 visit with Dr. Gordon: Axonics stage 1 on 6/10/24 and stage 2 on 24.Last seen in Office [24] Last seen By Beverley Patterson [24]    The patient had been doing well. She had a UTI 1 week ago, culture showed, she was treated with nitrofurantoin. UA today shows WBC, protein and blood.Dtfx 6-7 and NTFx 3.     She also complaints of worsening bugle complaints. She also has a sensation of incomplete bladder emptying. Her PVR is >200 ml.     She denies any vaginal complaints, no abnormal bleeding or discharge.     She has no other complaints.    Last Seen By Beverley Patterson 24:  Ana Vega is a 78 y.o. female who presents for follow up sacral neuromodulation implanted on 24 for voiding dysfunction and retention.     Urine small leuk,  ml; doesn't appear infected; happy w results, previously  ml;   DTF 4 x, NTF 2 x, no leakage of urine; happy w results;   On program 1-, 3+ , amp 1.30 would like one click higher 1.40 feels she could empty a little better; always has more difficulty when she comes to office;   PMH, PSH, FH, SH reviewed.  Hx breast cancer, would like to use  some estrogen cream in vagina, will call with name of oncologist;   Battery life 8.7 to 10.4 years.    PLAN:Continue current program, doing so well  ml today quite an improvement from before (348 ml) would like amp turned up one notch, increased to 1.4 from 1.3 today; will follow up Axonics clinic 1 year    Will call with name of oncologist to get ok for vaginal estrogen cream before sent in;     Nurse line [phone]     1 year Axonics clinic, first Wednesday afternoon of the month with Beverley    From Previous note 6/18/24  1.Retention  2.Chacha   3.Small cystocele    3a. POP-Q: Aa: -1 Ba: -1 C: -6 Gh: 2.5 Pb: 3 TVL: 7.5 Ap: -2.5 Bp: -2.5 D: -7  4.Hx breast CA     This is a telehealth visit to review patient's symptoms.  Patient underwent Axonics stage I on Felicity 10, 2024 for voiding dysfunction and retention.  She reports improvement in flow and frequency. She reports reports some hesitation to finish the flow but she feels that she is emptying better. She reports over 50% improvement in the flow and freq  She is voiding 8, 7 , 4, 15 , 10 oz with each void on her own based on the diary. This was this morning diary.  This is compared to a total voided volume of 2-5 oz per void at the initial days of the therapy   No pain at the incision site. She is feeling the stimulation from the axonics in the right side of the vagina         ROS: 12pt ROS otherwise negative unless stated above in HPI      PHYSICAL EXAMINATION:  General: Appears comfortable and in no apparent distress, well nourished  Head: Normocephalic, atraumatic  Neck: trachea midline  Respiratory: respirations unlabored, no wheezes, and no use of accessory muscles  Cardiovascular: at rest no dyspnea, well perfused  Skin: no visible rashes or lesions  Neurologic: grossly intact, oriented to person, place, and time  Psychiatric: mood and affect appropriate  Musculoskeletal: in chair for appt. no difficulty w upper body movement      Pelvic:  No erosion,  no abnormal discharge or bleeding; pessary removed, cleaned and reinserted, good fit;      IMPRESSION AND PLAN:  Ana Vega  is an 79 y.o.  with hx of voiding dysfunction and retention  Axonics stage 1 on 6/10/24 and stage 2 on 24; now with pessary;     Plan:  Estrogen vaginal 1 gram 2-3 x per week per vagina with dispenser, rub a bit of that cream at urethra as well;     Dmanose or Ellura (sample given) and handout as Ecoli UTI recently    Turn off axonics for holiday from stimulation as feels not helping  Once turns back on in a month, can put on program feels worked best  Able to change to other program and adjust as needed   If no better follow up in office with me sooner for axonics appt.  afternoon of month, can bring in rep to assist with reprogramming    Pessary reinserted, no erosion today,   Follow up 3 mos Beverley kothari

## 2025-07-08 NOTE — PATIENT INSTRUCTIONS
Estrogen vaginal 1 gram 2-3 x per week per vagina with dispenser, rub a bit of that cream at urethra as well;     Dmanose or Ellura (sample given) and handout as Ecoli UTI recently    Turn off axonics for holiday from stimulation as feels not helping  Once turns back on in a month, can put on program feels worked best  Able to change to other program and adjust as needed   If no better follow up in office with me sooner for axonics appt. 1st wed afternoon of month, can bring in rep to assist with reprogramming    Pessary reinserted, no erosion today,   Follow up 3 mos Beverley

## 2025-07-09 ENCOUNTER — TELEPHONE (OUTPATIENT)
Dept: PRIMARY CARE | Facility: CLINIC | Age: 80
End: 2025-07-09
Payer: MEDICARE

## 2025-07-09 ENCOUNTER — PATIENT OUTREACH (OUTPATIENT)
Dept: CARE COORDINATION | Facility: CLINIC | Age: 80
End: 2025-07-09
Payer: MEDICARE

## 2025-07-09 DIAGNOSIS — M79.602 PARESTHESIA AND PAIN OF BOTH UPPER EXTREMITIES: ICD-10-CM

## 2025-07-09 DIAGNOSIS — M79.601 PARESTHESIA AND PAIN OF BOTH UPPER EXTREMITIES: ICD-10-CM

## 2025-07-09 DIAGNOSIS — R20.2 PARESTHESIA AND PAIN OF BOTH UPPER EXTREMITIES: ICD-10-CM

## 2025-07-09 NOTE — TELEPHONE ENCOUNTER
REFILL  MEDICATION:     Gabapentin 400 MG; Take 1 capsule 3 times a day.     PHARM: [facility]  PHARM NUMBER: [phone]    LR: 4/7/25       270 capsules with 0 refills   LV: 6/24/25  NV: 7/31/25

## 2025-07-10 RX ORDER — GABAPENTIN 400 MG/1
400 CAPSULE ORAL 3 TIMES DAILY
Qty: 270 CAPSULE | Refills: 0 | Status: SHIPPED | OUTPATIENT
Start: 2025-07-10

## 2025-07-24 ASSESSMENT — ENCOUNTER SYMPTOMS: HYPERTENSION: 1

## 2025-07-28 ENCOUNTER — PATIENT OUTREACH (OUTPATIENT)
Dept: CARE COORDINATION | Facility: CLINIC | Age: 80
End: 2025-07-28
Payer: MEDICARE

## 2025-07-31 ENCOUNTER — APPOINTMENT (OUTPATIENT)
Dept: PRIMARY CARE | Facility: CLINIC | Age: 80
End: 2025-07-31
Payer: MEDICARE

## 2025-07-31 VITALS
TEMPERATURE: 97.4 F | SYSTOLIC BLOOD PRESSURE: 122 MMHG | DIASTOLIC BLOOD PRESSURE: 80 MMHG | BODY MASS INDEX: 31.68 KG/M2 | WEIGHT: 149 LBS

## 2025-07-31 DIAGNOSIS — N30.00 ACUTE CYSTITIS WITHOUT HEMATURIA: ICD-10-CM

## 2025-07-31 DIAGNOSIS — G45.9 TIA (TRANSIENT ISCHEMIC ATTACK): Primary | ICD-10-CM

## 2025-07-31 PROCEDURE — 99214 OFFICE O/P EST MOD 30 MIN: CPT | Performed by: FAMILY MEDICINE

## 2025-07-31 PROCEDURE — 3074F SYST BP LT 130 MM HG: CPT | Performed by: FAMILY MEDICINE

## 2025-07-31 PROCEDURE — 1160F RVW MEDS BY RX/DR IN RCRD: CPT | Performed by: FAMILY MEDICINE

## 2025-07-31 PROCEDURE — 1159F MED LIST DOCD IN RCRD: CPT | Performed by: FAMILY MEDICINE

## 2025-07-31 PROCEDURE — 3079F DIAST BP 80-89 MM HG: CPT | Performed by: FAMILY MEDICINE

## 2025-07-31 ASSESSMENT — ENCOUNTER SYMPTOMS: HYPERTENSION: 1

## 2025-07-31 NOTE — PATIENT INSTRUCTIONS
Follow up with urology.  Follow up with the CT chest for the lung nodules scheduled on 8/6/25.  Return if you have any worsened symptoms.  Otherwise, return in three months.

## 2025-07-31 NOTE — PROGRESS NOTES
"Subjective   Patient ID: [id number]     Ana Vega is a 79 y.o. female who presents for Follow-up (One month).  Hypertension  This is a chronic problem. The current episode started more than 1 year ago. The problem has been gradually improving since onset. The problem is controlled. Agents associated with hypertension include thyroid hormones. Risk factors for coronary artery disease include dyslipidemia and obesity.       She is here for a one month recheck.  She was last seen here on 6/30/25, one week after a hospitalization for a TIA.  She had a TIA with vertigo, ataxia and abnormal extraocular movements and disconjugat pupils with left lateral gaze.      She was seen 6/30/25 and improving.  She had UTI symptoms and had been treated for a UTI.  She was given a different antibiotic.  She has been referred to urology.  She saw Beverley Patterson CNP on 7/8/25.  She uses pessaries.  PVR was improving at her 7/8/25 appointment.      They placed a pessary and that helps.       She states she is \"back to normal\".  Denies dysuria or increased frequency of urination.  She denies dizziness, incoordination or diplopia.      She feels a lot better.      Objective     /80   Temp 36.3 °C (97.4 °F) (Skin)   Wt 67.6 kg (149 lb)   BMI 31.68 kg/m²      Physical Exam  Constitutional:       Appearance: Normal appearance.     Eyes:      Pupils: Pupils are equal, round, and reactive to light.      Comments: Still mild restricted EOMI with left lateral gaze.  Gait steady, stable.  No focal motor weakness.  In exercise class four times per week.     Cardiovascular:      Rate and Rhythm: Normal rate and regular rhythm.      Heart sounds: Normal heart sounds. No murmur heard.  Pulmonary:      Effort: Pulmonary effort is normal. No respiratory distress.      Breath sounds: Normal breath sounds.     Neurological:      General: No focal deficit present.      Mental Status: She is alert and oriented to person, place, and time. "         Assessment/Plan   Problem List Items Addressed This Visit       UTI (urinary tract infection)     Other Visit Diagnoses         TIA (transient ischemic attack)    -  Primary          Follow up with urology.  Follow up with the CT chest for the lung nodules scheduled on 8/6/25.  Return if you have any worsened symptoms.  Otherwise, return in three months.    Jose Corea, DO

## 2025-08-06 ENCOUNTER — HOSPITAL ENCOUNTER (OUTPATIENT)
Dept: RADIOLOGY | Facility: CLINIC | Age: 80
Discharge: HOME | End: 2025-08-06
Payer: MEDICARE

## 2025-08-06 DIAGNOSIS — R91.1 LUNG NODULE SEEN ON IMAGING STUDY: ICD-10-CM

## 2025-08-06 PROCEDURE — 71250 CT THORAX DX C-: CPT

## 2025-08-06 PROCEDURE — 71250 CT THORAX DX C-: CPT | Performed by: STUDENT IN AN ORGANIZED HEALTH CARE EDUCATION/TRAINING PROGRAM

## 2025-08-12 ENCOUNTER — RESULTS FOLLOW-UP (OUTPATIENT)
Dept: PRIMARY CARE | Facility: CLINIC | Age: 80
End: 2025-08-12
Payer: MEDICARE

## 2025-08-12 DIAGNOSIS — C50.912 MALIGNANT NEOPLASM OF LEFT FEMALE BREAST, UNSPECIFIED ESTROGEN RECEPTOR STATUS, UNSPECIFIED SITE OF BREAST: ICD-10-CM

## 2025-08-12 DIAGNOSIS — R91.8 LUNG NODULES: Primary | ICD-10-CM

## 2025-08-15 ENCOUNTER — APPOINTMENT (OUTPATIENT)
Dept: PRIMARY CARE | Facility: CLINIC | Age: 80
End: 2025-08-15
Payer: MEDICARE

## 2025-08-24 ENCOUNTER — OFFICE VISIT (OUTPATIENT)
Dept: URGENT CARE | Age: 80
End: 2025-08-24
Payer: MEDICARE

## 2025-08-24 VITALS
TEMPERATURE: 97.7 F | OXYGEN SATURATION: 96 % | SYSTOLIC BLOOD PRESSURE: 145 MMHG | HEART RATE: 63 BPM | RESPIRATION RATE: 16 BRPM | DIASTOLIC BLOOD PRESSURE: 67 MMHG

## 2025-08-24 DIAGNOSIS — R39.198 DIFFICULTY URINATING: ICD-10-CM

## 2025-08-24 LAB
POC BILIRUBIN, URINE: NEGATIVE
POC BLOOD, URINE: ABNORMAL
POC GLUCOSE, URINE: NEGATIVE MG/DL
POC KETONES, URINE: NEGATIVE MG/DL
POC LEUKOCYTES, URINE: NEGATIVE
POC NITRITE,URINE: NEGATIVE
POC PH, URINE: 6 PH
POC PROTEIN, URINE: ABNORMAL MG/DL
POC SPECIFIC GRAVITY, URINE: 1.01
POC UROBILINOGEN, URINE: 0.2 EU/DL

## 2025-08-24 PROCEDURE — 81003 URINALYSIS AUTO W/O SCOPE: CPT | Performed by: PHYSICIAN ASSISTANT

## 2025-08-24 PROCEDURE — 99213 OFFICE O/P EST LOW 20 MIN: CPT | Performed by: PHYSICIAN ASSISTANT

## 2025-08-24 PROCEDURE — 3077F SYST BP >= 140 MM HG: CPT | Performed by: PHYSICIAN ASSISTANT

## 2025-08-24 PROCEDURE — 3078F DIAST BP <80 MM HG: CPT | Performed by: PHYSICIAN ASSISTANT

## 2025-08-24 PROCEDURE — 1159F MED LIST DOCD IN RCRD: CPT | Performed by: PHYSICIAN ASSISTANT

## 2025-08-24 PROCEDURE — 1160F RVW MEDS BY RX/DR IN RCRD: CPT | Performed by: PHYSICIAN ASSISTANT

## 2025-08-27 ENCOUNTER — HOSPITAL ENCOUNTER (OUTPATIENT)
Dept: RADIOLOGY | Facility: CLINIC | Age: 80
Discharge: HOME | End: 2025-08-27
Payer: MEDICARE

## 2025-08-27 DIAGNOSIS — C50.912 MALIGNANT NEOPLASM OF LEFT FEMALE BREAST, UNSPECIFIED ESTROGEN RECEPTOR STATUS, UNSPECIFIED SITE OF BREAST: ICD-10-CM

## 2025-08-27 DIAGNOSIS — R91.8 LUNG NODULES: ICD-10-CM

## 2025-08-27 PROCEDURE — 3430000001 HC RX 343 DIAGNOSTIC RADIOPHARMACEUTICALS: Performed by: FAMILY MEDICINE

## 2025-08-27 PROCEDURE — 78815 PET IMAGE W/CT SKULL-THIGH: CPT | Mod: PS

## 2025-08-27 PROCEDURE — A9552 F18 FDG: HCPCS | Performed by: FAMILY MEDICINE

## 2025-08-27 PROCEDURE — 78815 PET IMAGE W/CT SKULL-THIGH: CPT | Mod: PET TUMOR SUBSQ TX STRATEGY | Performed by: STUDENT IN AN ORGANIZED HEALTH CARE EDUCATION/TRAINING PROGRAM

## 2025-08-27 RX ORDER — FLUDEOXYGLUCOSE F 18 200 MCI/ML
12.6 INJECTION, SOLUTION INTRAVENOUS
Status: COMPLETED | OUTPATIENT
Start: 2025-08-27 | End: 2025-08-27

## 2025-08-27 RX ADMIN — FLUDEOXYGLUCOSE F 18 12.6 MILLICURIE: 200 INJECTION, SOLUTION INTRAVENOUS at 13:14

## 2025-08-29 ENCOUNTER — APPOINTMENT (OUTPATIENT)
Dept: PRIMARY CARE | Facility: CLINIC | Age: 80
End: 2025-08-29
Payer: MEDICARE

## 2025-08-29 VITALS
SYSTOLIC BLOOD PRESSURE: 148 MMHG | BODY MASS INDEX: 30.83 KG/M2 | DIASTOLIC BLOOD PRESSURE: 74 MMHG | WEIGHT: 145 LBS | TEMPERATURE: 97.9 F

## 2025-08-29 DIAGNOSIS — G89.29 CHRONIC PAIN OF RIGHT KNEE: ICD-10-CM

## 2025-08-29 DIAGNOSIS — R91.1 LUNG NODULE SEEN ON IMAGING STUDY: ICD-10-CM

## 2025-08-29 DIAGNOSIS — M25.561 CHRONIC PAIN OF RIGHT KNEE: ICD-10-CM

## 2025-08-29 DIAGNOSIS — E04.1 THYROID NODULE: Primary | ICD-10-CM

## 2025-08-29 PROCEDURE — 3078F DIAST BP <80 MM HG: CPT | Performed by: FAMILY MEDICINE

## 2025-08-29 PROCEDURE — 1159F MED LIST DOCD IN RCRD: CPT | Performed by: FAMILY MEDICINE

## 2025-08-29 PROCEDURE — 3077F SYST BP >= 140 MM HG: CPT | Performed by: FAMILY MEDICINE

## 2025-08-29 PROCEDURE — 1036F TOBACCO NON-USER: CPT | Performed by: FAMILY MEDICINE

## 2025-08-29 PROCEDURE — 1160F RVW MEDS BY RX/DR IN RCRD: CPT | Performed by: FAMILY MEDICINE

## 2025-08-29 PROCEDURE — 99214 OFFICE O/P EST MOD 30 MIN: CPT | Performed by: FAMILY MEDICINE

## 2025-09-16 ENCOUNTER — APPOINTMENT (OUTPATIENT)
Facility: CLINIC | Age: 80
End: 2025-09-16
Payer: MEDICARE

## 2025-09-23 ENCOUNTER — APPOINTMENT (OUTPATIENT)
Facility: CLINIC | Age: 80
End: 2025-09-23
Payer: MEDICARE

## 2025-10-08 ENCOUNTER — APPOINTMENT (OUTPATIENT)
Dept: UROLOGY | Facility: CLINIC | Age: 80
End: 2025-10-08
Payer: MEDICARE

## 2025-10-30 ENCOUNTER — APPOINTMENT (OUTPATIENT)
Dept: PRIMARY CARE | Facility: CLINIC | Age: 80
End: 2025-10-30
Payer: MEDICARE

## 2025-11-05 ENCOUNTER — APPOINTMENT (OUTPATIENT)
Dept: UROLOGY | Facility: CLINIC | Age: 80
End: 2025-11-05
Payer: MEDICARE

## 2025-11-21 ENCOUNTER — APPOINTMENT (OUTPATIENT)
Dept: OPHTHALMOLOGY | Facility: CLINIC | Age: 80
End: 2025-11-21
Payer: MEDICARE

## 2025-11-25 ENCOUNTER — APPOINTMENT (OUTPATIENT)
Dept: PRIMARY CARE | Facility: CLINIC | Age: 80
End: 2025-11-25
Payer: MEDICARE

## 2025-11-28 ENCOUNTER — APPOINTMENT (OUTPATIENT)
Dept: OPHTHALMOLOGY | Facility: CLINIC | Age: 80
End: 2025-11-28
Payer: MEDICARE

## 2025-12-05 ENCOUNTER — APPOINTMENT (OUTPATIENT)
Dept: OPHTHALMOLOGY | Facility: CLINIC | Age: 80
End: 2025-12-05
Payer: MEDICARE

## (undated) DEVICE — SOLUTION, IRRIGATION, STERILE WATER, 1000 ML, POUR BOTTLE

## (undated) DEVICE — DRAPE, SHEET, 17 X 23 IN

## (undated) DEVICE — DRAPE, C-ARM IMAGE

## (undated) DEVICE — GLOVE, SURGICAL, PROTEXIS PI , 7.5, PF, LF

## (undated) DEVICE — SUTURE, PROLENE, 3-0, 36 IN, SH, DA, BLUE

## (undated) DEVICE — REMOTE CONTROL, PATIENT (2301)

## (undated) DEVICE — Device

## (undated) DEVICE — APPLICATOR, CHLORAPREP, W/ORANGE TINT, 26ML

## (undated) DEVICE — SUTURE, VICRYL, 4-0, 18 IN, UNDYED BR PS-2

## (undated) DEVICE — SUTURE, VICRYL, 3-0, 27 IN, SH

## (undated) DEVICE — TOWEL PACK, STERILE, 4/PACK, BLUE

## (undated) DEVICE — TRIAL, STIMULATOR, EXTERNAL

## (undated) DEVICE — PROBE COVER, ULTRASOUND, 6 X 96, GEL PACKET

## (undated) DEVICE — PERCUTANEOUS EXTENSION

## (undated) DEVICE — DRESSING, GAUZE, DRAIN SPONGE, 6 PLY, EXCILON, 4 X 4 IN, STERILE

## (undated) DEVICE — GLOVE, SURGICAL, PROTEXIS PI ORTHO, 7.5, PF, LF

## (undated) DEVICE — SOLUTION, IRRIGATION, SODIUM CHLORIDE 0.9%, 1000 ML, POUR BOTTLE

## (undated) DEVICE — ADHESIVE, SKIN, DERMABOND ADVANCED, 15CM, PEN-STYLE

## (undated) DEVICE — DRESSING, TRANSPARENT, TEGADERM, 4 X 4-3/4 IN